# Patient Record
Sex: FEMALE | Race: WHITE | NOT HISPANIC OR LATINO | Employment: FULL TIME | URBAN - METROPOLITAN AREA
[De-identification: names, ages, dates, MRNs, and addresses within clinical notes are randomized per-mention and may not be internally consistent; named-entity substitution may affect disease eponyms.]

---

## 2017-02-06 ENCOUNTER — RADIANT APPOINTMENT (OUTPATIENT)
Dept: MAMMOGRAPHY | Facility: CLINIC | Age: 52
End: 2017-02-06
Attending: FAMILY MEDICINE
Payer: COMMERCIAL

## 2017-02-06 DIAGNOSIS — Z12.31 VISIT FOR SCREENING MAMMOGRAM: ICD-10-CM

## 2017-02-06 PROCEDURE — G0202 SCR MAMMO BI INCL CAD: HCPCS | Mod: TC

## 2017-02-14 ENCOUNTER — TELEPHONE (OUTPATIENT)
Dept: FAMILY MEDICINE | Facility: CLINIC | Age: 52
End: 2017-02-14

## 2017-02-14 DIAGNOSIS — F43.23 ADJUSTMENT DISORDER WITH MIXED ANXIETY AND DEPRESSED MOOD: Primary | ICD-10-CM

## 2017-02-14 ASSESSMENT — PATIENT HEALTH QUESTIONNAIRE - PHQ9: 5. POOR APPETITE OR OVEREATING: NOT AT ALL

## 2017-02-14 ASSESSMENT — ANXIETY QUESTIONNAIRES
6. BECOMING EASILY ANNOYED OR IRRITABLE: NOT AT ALL
1. FEELING NERVOUS, ANXIOUS, OR ON EDGE: NOT AT ALL
3. WORRYING TOO MUCH ABOUT DIFFERENT THINGS: NOT AT ALL
7. FEELING AFRAID AS IF SOMETHING AWFUL MIGHT HAPPEN: NOT AT ALL
2. NOT BEING ABLE TO STOP OR CONTROL WORRYING: NOT AT ALL
IF YOU CHECKED OFF ANY PROBLEMS ON THIS QUESTIONNAIRE, HOW DIFFICULT HAVE THESE PROBLEMS MADE IT FOR YOU TO DO YOUR WORK, TAKE CARE OF THINGS AT HOME, OR GET ALONG WITH OTHER PEOPLE: NOT DIFFICULT AT ALL
5. BEING SO RESTLESS THAT IT IS HARD TO SIT STILL: NOT AT ALL
GAD7 TOTAL SCORE: 0

## 2017-02-14 NOTE — LETTER
My Depression Action Plan  Name: Evangelina De Anda   Date of Birth 1965  Date: 2/14/2017    My doctor: Luis Antonio Mejia   My clinic: [address] [address] [phone]          GREEN    ZONE   Good Control    What it looks like:     Things are going generally well. You have normal up s and down s. You may even feel depressed from time to time, but bad moods usually last less than a day.   What you need to do:  1. Continue to care for yourself (see self care plan)  2. Check your depression survival kit and update it as needed  3. Follow your physician s recommendations including any medication.  4. Do not stop taking medication unless you consult with your physician first.           YELLOW         ZONE Getting Worse    What it looks like:     Depression is starting to interfere with your life.     It may be hard to get out of bed; you may be starting to isolate yourself from others.    Symptoms of depression are starting to last most all day and this has happened for several days.     You may have suicidal thoughts but they are not constant.   What you need to do:     1. Call your care team, your response to treatment will improve if you keep your care team informed of your progress. Yellow periods are signs an adjustment may need to be made.     2. Continue your self-care, even if you have to fake it!    3. Talk to someone in your support network    4. Open up your depression survival kit           RED    ZONE Medical Alert - Get Help    What it looks like:     Depression is seriously interfering with your life.     You may experience these or other symptoms: You can t get out of bed most days, can t work or engage in other necessary activities, you have trouble taking care of basic hygiene, or basic responsibilities, thoughts of suicide or death that will not go away, self-injurious behavior.     What you need to do:  1. Call your care team  and request a same-day appointment. If they are not available (weekends or after hours) call your local crisis line, emergency room or 911.      Electronically signed by: Roe Ramsey, February 14, 2017    Depression Self Care Plan / Survival Kit    Self-Care for Depression  Here s the deal. Your body and mind are really not as separate as most people think.  What you do and think affects how you feel and how you feel influences what you do and think. This means if you do things that people who feel good do, it will help you feel better.  Sometimes this is all it takes.  There is also a place for medication and therapy depending on how severe your depression is, so be sure to consult with your medical provider and/ or Behavioral Health Consultant if your symptoms are worsening or not improving.     In order to better manage my stress, I will:    Exercise  Get some form of exercise, every day. This will help reduce pain and release endorphins, the  feel good  chemicals in your brain. This is almost as good as taking antidepressants!  This is not the same as joining a gym and then never going! (they count on that by the way ) It can be as simple as just going for a walk or doing some gardening, anything that will get you moving.      Hygiene   Maintain good hygiene (Get out of bed in the morning, Make your bed, Brush your teeth, Take a shower, and Get dressed like you were going to work, even if you are unemployed).  If your clothes don't fit try to get ones that do.    Diet  I will strive to eat foods that are good for me, drink plenty of water, and avoid excessive sugar, caffeine, alcohol, and other mood-altering substances.  Some foods that are helpful in depression are: complex carbohydrates, B vitamins, flaxseed, fish or fish oil, fresh fruits and vegetables.    Psychotherapy  I agree to participate in Individual Therapy (if recommended).    Medication  If prescribed medications, I agree to take them.  Missing  doses can result in serious side effects.  I understand that drinking alcohol, or other illicit drug use, may cause potential side effects.  I will not stop my medication abruptly without first discussing it with my provider.    Staying Connected With Others  I will stay in touch with my friends, family members, and my primary care provider/team.    Use your imagination  Be creative.  We all have a creative side; it doesn t matter if it s oil painting, sand castles, or mud pies! This will also kick up the endorphins.    Witness Beauty  (AKA stop and smell the roses) Take a look outside, even in mid-winter. Notice colors, textures. Watch the squirrels and birds.     Service to others  Be of service to others.  There is always someone else in need.  By helping others we can  get out of ourselves  and remember the really important things.  This also provides opportunities for practicing all the other parts of the program.    Humor  Laugh and be silly!  Adjust your TV habits for less news and crime-drama and more comedy.    Control your stress  Try breathing deep, massage therapy, biofeedback, and meditation. Find time to relax each day.     My support system    Clinic Contact:  Phone number:    Contact 1:  Phone number:    Contact 2:  Phone number:    [employer]/:  Phone number:    Therapist:  Phone number:    Local crisis center:    Phone number:    Other community support:  Phone number:

## 2017-02-14 NOTE — TELEPHONE ENCOUNTER
Panel Management Review      Patient has the following on her problem list: None      Composite cancer screening  Chart review shows that this patient is due/due soon for the following None  Summary:    Patient is due/failing the following:   PHQ9    Action needed:   Patient needs to do PHQ9.    Type of outreach:    Phone, left message for patient to call back.     Questions for provider review:    None                                                                                                                                    YASMIN May       Chart routed to  .

## 2017-02-15 ASSESSMENT — PATIENT HEALTH QUESTIONNAIRE - PHQ9: SUM OF ALL RESPONSES TO PHQ QUESTIONS 1-9: 1

## 2017-02-15 ASSESSMENT — ANXIETY QUESTIONNAIRES: GAD7 TOTAL SCORE: 0

## 2017-03-13 ENCOUNTER — OFFICE VISIT (OUTPATIENT)
Dept: OBGYN | Facility: CLINIC | Age: 52
End: 2017-03-13
Payer: COMMERCIAL

## 2017-03-13 VITALS
HEIGHT: 66 IN | TEMPERATURE: 97.5 F | HEART RATE: 63 BPM | RESPIRATION RATE: 18 BRPM | BODY MASS INDEX: 22.97 KG/M2 | WEIGHT: 142.9 LBS | OXYGEN SATURATION: 99 % | DIASTOLIC BLOOD PRESSURE: 60 MMHG | SYSTOLIC BLOOD PRESSURE: 102 MMHG

## 2017-03-13 DIAGNOSIS — N90.89 VULVAR LESION: ICD-10-CM

## 2017-03-13 DIAGNOSIS — L90.0 LICHEN SCLEROSUS ET ATROPHICUS: ICD-10-CM

## 2017-03-13 DIAGNOSIS — Z00.00 ROUTINE GENERAL MEDICAL EXAMINATION AT A HEALTH CARE FACILITY: Primary | ICD-10-CM

## 2017-03-13 PROCEDURE — G0476 HPV COMBO ASSAY CA SCREEN: HCPCS | Performed by: FAMILY MEDICINE

## 2017-03-13 PROCEDURE — 99396 PREV VISIT EST AGE 40-64: CPT | Mod: 25 | Performed by: FAMILY MEDICINE

## 2017-03-13 PROCEDURE — G0145 SCR C/V CYTO,THINLAYER,RESCR: HCPCS | Performed by: FAMILY MEDICINE

## 2017-03-13 PROCEDURE — 56605 BIOPSY OF VULVA/PERINEUM: CPT | Performed by: FAMILY MEDICINE

## 2017-03-13 PROCEDURE — 87624 HPV HI-RISK TYP POOLED RSLT: CPT | Performed by: FAMILY MEDICINE

## 2017-03-13 PROCEDURE — 88305 TISSUE EXAM BY PATHOLOGIST: CPT | Mod: 26 | Performed by: FAMILY MEDICINE

## 2017-03-13 PROCEDURE — 88305 TISSUE EXAM BY PATHOLOGIST: CPT | Performed by: FAMILY MEDICINE

## 2017-03-13 NOTE — MR AVS SNAPSHOT
After Visit Summary   3/13/2017    Evangelina De Anda    MRN: [id number]           Patient Information     Date Of Birth          1965        Visit Information        Provider Department      3/13/2017 10:45 AM Melinda Whitten,  [facility]        Today's Diagnoses     Routine general medical examination at a health care facility    -  1    Vulvar lesion          Care Instructions    Return 1-2 weeks     Dr. Melinda Whitten, DO    Obstetrics and Gynecology  [facility] [place]               Follow-ups after your visit        Future tests that were ordered for you today     Open Future Orders        Priority Expected Expires Ordered    CBC with platelets Routine  3/13/2018 3/13/2017    Comprehensive metabolic panel Routine  3/13/2018 3/13/2017    Lipid panel reflex to direct LDL Routine  3/13/2018 3/13/2017    TSH with free T4 reflex Routine  3/13/2018 3/13/2017            Who to contact     If you have questions or need follow up information about today's clinic visit or your schedule please contact [facility] directly at [phone].  Normal or non-critical lab and imaging results will be communicated to you by RxAdvancehart, letter or phone within 4 business days after the clinic has received the results. If you do not hear from us within 7 days, please contact the clinic through Profylet or phone. If you have a critical or abnormal lab result, we will notify you by phone as soon as possible.  Submit refill requests through SmarTots or call your pharmacy and they will forward the refill request to us. Please allow 3 business days for your refill to be completed.          Additional Information About Your Visit        RxAdvancehart Information     SmarTots gives you secure access to your electronic health record. If you see a primary care provider, you can also send messages to your care team and make appointments. If you have questions, please call  "your primary care clinic.  If you do not have a primary care provider, please call [phone] and they will assist you.        Care EveryWhere ID     This is your Care EveryWhere ID. This could be used by other organizations to access your [facility] medical records  [id number]        Your Vitals Were     Pulse Temperature Respirations Height Last Period Pulse Oximetry    63 97.5  F (36.4  C) (Oral) 18 5' 6\" (1.676 m) 03/10/2017 99%    BMI (Body Mass Index)                   23.06 kg/m2            Blood Pressure from Last 3 Encounters:   03/13/17 102/60   12/08/16 100/64   07/13/15 105/79    Weight from Last 3 Encounters:   03/13/17 142 lb 14.4 oz (64.8 kg)   12/08/16 141 lb 1.6 oz (64 kg)   07/13/15 142 lb 3.2 oz (64.5 kg)              We Performed the Following     BIOPSY VULVA/PERINEUM, ONE LESION        Primary Care Provider Office Phone # Fax #    Luis Antonio Mejia [phone]       [facility] [address] [address] [facility] [address]        Thank you!     Thank you for choosing [facility]  for your care. Our goal is always to provide you with excellent care. Hearing back from our patients is one way we can continue to improve our services. Please take a few minutes to complete the written survey that you may receive in the mail after your visit with us. Thank you!             Your Updated Medication List - Protect others around you: Learn how to safely use, store and throw away your medicines at www.disposemymeds.org.          This list is accurate as of: 3/13/17 11:32 AM.  Always use your most recent med list.                   Brand Name Dispense Instructions for use    ibuprofen 800 MG tablet    ADVIL/MOTRIN    90 tablet    Take 1 tablet (800 mg) by mouth every 8 hours as needed for moderate pain       Multi-vitamin Tabs tablet   Generic drug:  multivitamin, therapeutic with minerals     0    1 TABLET DAILY         "

## 2017-03-13 NOTE — PROGRESS NOTES
SUBJECTIVE:  Evangelina De Anda is an 51 year old  woman who presents for   annual gyn exam. Patient's last menstrual period was 03/10/2017. Periods are irregular and less. Dysmenorrhea:none. Cyclic symptoms include none. No intermenstrual bleeding, spotting, or discharge.  Menarche age 13.  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 Pain present upon wiping and tampon use. Denies vaginal itching or burning.   Just recently went 3 months without getting her period, but had 2 within the last 2 months.     Current contraception: none  SEGUN exposure: no  History of abnormal Pap smear: No  Family history of uterine or ovarian cancer: No  Regular self breast exam: No  History of abnormal mammogram: No  Family history of breast cancer: No  History of abnormal lipids: No    Past Medical History   Diagnosis Date     Alcohol abuse, in remission      Endometrial cells on cervical Pap smear inconsistent w/LMP 2012     endo bx WNL     MAYRA II (vulvar intraepithelial neoplasia II) 2012        Family History   Problem Relation Age of Onset     C.A.D. Father 65     heart attack and quadruble bypass     CANCER Father      spleen     HEART DISEASE Father      Hypertension Paternal Grandmother      CEREBROVASCULAR DISEASE Maternal Grandmother      Cancer - colorectal Mother      in her 50's.     Neurologic Disorder Mother      MS- at age of 65     HEART DISEASE Sister 38      from massive heart attack at age of 38     Family History Negative Brother      Family History Negative Sister        Past Surgical History   Procedure Laterality Date     Surgical history of -        C section     Gyn surgery            Excise vulva wide local  7/15/2014     Procedure: EXCISE VULVA WIDE LOCAL;  Surgeon: Melinda Whitten DO;  Location: [facility] OR     Colonoscopy  2015     Dr. Estella LAROSE     Tonsillectomy       T&A as a child     Appendectomy       age 6 yrs.       Current  "Outpatient Prescriptions   Medication     ibuprofen (ADVIL,MOTRIN) 800 MG tablet     MULTI-VITAMIN OR TABS     No current facility-administered medications for this visit.      Allergies   Allergen Reactions     Pollen Extract        Social History   Substance Use Topics     Smoking status: Current Every Day Smoker     Types: Cigarettes     Smokeless tobacco: Never Used      Comment: 1 cig/day      Alcohol use No      Comment: stopped march 7th 2014       Review Of Systems  Ears/Nose/Throat: negative  Respiratory: No shortness of breath, dyspnea on exertion, cough, or hemoptysis  Cardiovascular: negative  Gastrointestinal: negative  Genitourinary: negative    This document serves as a record of the services and decisions personally performed and made by Melinda Whitten DO. It was created on his/her behalf by Cathryn Escobar, a trained medical scribe. The creation of this document is based the provider's statements to the medical scribe.  Scribziggy Escobar 10:57 AM, March 13, 2017    OBJECTIVE:  /60 (BP Location: Right arm, Patient Position: Chair, Cuff Size: Adult Regular)  Pulse 63  Temp 97.5  F (36.4  C) (Oral)  Resp 18  Ht 1.676 m (5' 6\")  Wt 64.8 kg (142 lb 14.4 oz)  LMP 03/10/2017  SpO2 99%  BMI 23.06 kg/m2    General appearance: healthy, alert and no distress  Skin: Skin color, texture, turgor normal. No rashes or lesions.  Ears: negative  Nose/Sinuses: Nares normal. Septum midline. Mucosa normal. No drainage or sinus tenderness.  Oropharynx: Lips, mucosa, and tongue normal. Teeth and gums normal.  Neck: Neck supple. No adenopathy. Thyroid symmetric, normal size,, Carotids without bruits.  Lungs: negative, Percussion normal. Good diaphragmatic excursion. Lungs clear  Heart: negative, PMI normal. No lifts, heaves, or thrills. RRR. No murmurs, clicks gallops or rub  Breasts: Inspection negative. No nipple discharge or bleeding. No masses.  Abdomen: Abdomen soft, non-tender. BS normal. No masses, " organomegaly  Pelvic: Pelvic:  Pelvic examination with pap/ Gonorrhea and Chlamydia   including  External genitalia normal   and vagina normal rugatted not atrophic  Examination of urethra  normal no masses, tenderness, scarring  bladder, no masses or tenderness  Cervix no lesions or discharge  Bimanual exam with   Uterus 6 weeks size, mid position, mobile,non-tenderness, no descent   Adnexa/parametria  Normal, no masses   Rectovaginal normal, no masses       Informed consent for vulvar biopsy obtained  Procedure:  Vulva was cleansed with betadine x 3.  5 o'clock ulcerated vulva lesion injected with 1% lidocaine.  7 mm punch biopsy performed at 5 o'clock position.  The incisions were closed with 1 figure-of-8 suture of 4-0 monocryl.  Hemostasis assured. The patient tolerated the procedure well.  No intercourse for 6 weeks.        ASSESSMENT:  Evangelina De Anda is an 51 year old  woman who presents for   annual gyn exam. Concerns:  vulvar tenderness     PLAN:  Dx:  1)  Pap smear  2)  Mammography, lipids at appropriate intervals  3) Possible Lichen Sclerosis: vulvar bx today, return to clinic in 1 week for stitch removal      Rx:  None    PE:  Reviewed health maintenance including diet, regular exercise   and periodic exams.    The information in this document, created by the medical scribe for me, accurately reflects the services I personally performed and the decisions made by me. I have reviewed and approved this document for accuracy prior to leaving the patient care area.  Melinda Whitten DO  10:57 AM, 17      Dr. Melinda Whitten, DO    Obstetrics and Gynecology  [facility] [place]

## 2017-03-13 NOTE — NURSING NOTE
"Chief Complaint   Patient presents with     Physical       Initial /60 (BP Location: Right arm, Patient Position: Chair, Cuff Size: Adult Regular)  Pulse 63  Temp 97.5  F (36.4  C) (Oral)  Resp 18  Ht 5' 6\" (1.676 m)  Wt 142 lb 14.4 oz (64.8 kg)  LMP 03/10/2017  SpO2 99%  BMI 23.06 kg/m2 Estimated body mass index is 23.06 kg/(m^2) as calculated from the following:    Height as of this encounter: 5' 6\" (1.676 m).    Weight as of this encounter: 142 lb 14.4 oz (64.8 kg).  Medication Reconciliation: complete May Ellison CMA      "

## 2017-03-13 NOTE — PATIENT INSTRUCTIONS
Return 1-2 weeks     Dr. Melinda Whitten, DO    Obstetrics and Gynecology  [facility] [place] [facility] [place]

## 2017-03-14 ENCOUNTER — TELEPHONE (OUTPATIENT)
Dept: OBGYN | Facility: CLINIC | Age: 52
End: 2017-03-14

## 2017-03-14 LAB — COPATH REPORT: NORMAL

## 2017-03-14 RX ORDER — CLOBETASOL PROPIONATE 0.5 MG/G
CREAM TOPICAL
Qty: 60 G | Refills: 0 | Status: SHIPPED | OUTPATIENT
Start: 2017-03-14 | End: 2018-09-05

## 2017-03-14 NOTE — TELEPHONE ENCOUNTER
Pt calling stating her stitch came out from her biopsy yesterday.  States this has happened in the past and she just left it alone and did epsom baths.  Advised to leave area alone and watch for any signs of infection and follow up if she has any signs.  Can use non-adherent guaze if needed based on the area to keep clothes from getting blood on them.    FYI to Dr Whitten    Ok to close if nothing else needed    Che Welsh RN, BSN

## 2017-03-14 NOTE — TELEPHONE ENCOUNTER
Yes that is ok!   Dr. Melinda Whitten, DO    Obstetrics and Gynecology  [facility] [place] [facility] [place]

## 2017-03-15 LAB
COPATH REPORT: NORMAL
PAP: NORMAL

## 2017-03-17 LAB
FINAL DIAGNOSIS: NORMAL
HPV HR 12 DNA CVX QL NAA+PROBE: NEGATIVE
HPV16 DNA SPEC QL NAA+PROBE: NEGATIVE
HPV18 DNA SPEC QL NAA+PROBE: NEGATIVE
SPECIMEN DESCRIPTION: NORMAL

## 2017-03-18 DIAGNOSIS — Z00.00 ROUTINE GENERAL MEDICAL EXAMINATION AT A HEALTH CARE FACILITY: ICD-10-CM

## 2017-03-18 LAB
ALBUMIN SERPL-MCNC: 3.7 G/DL (ref 3.4–5)
ALP SERPL-CCNC: 64 U/L (ref 40–150)
ALT SERPL W P-5'-P-CCNC: 24 U/L (ref 0–50)
ANION GAP SERPL CALCULATED.3IONS-SCNC: 8 MMOL/L (ref 3–14)
AST SERPL W P-5'-P-CCNC: 14 U/L (ref 0–45)
BILIRUB SERPL-MCNC: 1.1 MG/DL (ref 0.2–1.3)
BUN SERPL-MCNC: 15 MG/DL (ref 7–30)
CALCIUM SERPL-MCNC: 8.2 MG/DL (ref 8.5–10.1)
CHLORIDE SERPL-SCNC: 108 MMOL/L (ref 94–109)
CHOLEST SERPL-MCNC: 161 MG/DL
CO2 SERPL-SCNC: 25 MMOL/L (ref 20–32)
CREAT SERPL-MCNC: 0.87 MG/DL (ref 0.52–1.04)
ERYTHROCYTE [DISTWIDTH] IN BLOOD BY AUTOMATED COUNT: 12.9 % (ref 10–15)
GFR SERPL CREATININE-BSD FRML MDRD: 69 ML/MIN/1.7M2
GLUCOSE SERPL-MCNC: 88 MG/DL (ref 70–99)
HCT VFR BLD AUTO: 36.9 % (ref 35–47)
HDLC SERPL-MCNC: 66 MG/DL
HGB BLD-MCNC: 12.6 G/DL (ref 11.7–15.7)
LDLC SERPL CALC-MCNC: 87 MG/DL
MCH RBC QN AUTO: 30.7 PG (ref 26.5–33)
MCHC RBC AUTO-ENTMCNC: 34.1 G/DL (ref 31.5–36.5)
MCV RBC AUTO: 90 FL (ref 78–100)
NONHDLC SERPL-MCNC: 95 MG/DL
PLATELET # BLD AUTO: 276 10E9/L (ref 150–450)
POTASSIUM SERPL-SCNC: 4.2 MMOL/L (ref 3.4–5.3)
PROT SERPL-MCNC: 6.7 G/DL (ref 6.8–8.8)
RBC # BLD AUTO: 4.11 10E12/L (ref 3.8–5.2)
SODIUM SERPL-SCNC: 141 MMOL/L (ref 133–144)
TRIGL SERPL-MCNC: 40 MG/DL
TSH SERPL DL<=0.005 MIU/L-ACNC: 1.69 MU/L (ref 0.4–4)
WBC # BLD AUTO: 6.5 10E9/L (ref 4–11)

## 2017-03-18 PROCEDURE — 80061 LIPID PANEL: CPT | Performed by: FAMILY MEDICINE

## 2017-03-18 PROCEDURE — 80053 COMPREHEN METABOLIC PANEL: CPT | Performed by: FAMILY MEDICINE

## 2017-03-18 PROCEDURE — 84443 ASSAY THYROID STIM HORMONE: CPT | Performed by: FAMILY MEDICINE

## 2017-03-18 PROCEDURE — 36415 COLL VENOUS BLD VENIPUNCTURE: CPT | Performed by: FAMILY MEDICINE

## 2017-03-18 PROCEDURE — 85027 COMPLETE CBC AUTOMATED: CPT | Performed by: FAMILY MEDICINE

## 2017-03-20 ENCOUNTER — OFFICE VISIT (OUTPATIENT)
Dept: OBGYN | Facility: CLINIC | Age: 52
End: 2017-03-20
Payer: COMMERCIAL

## 2017-03-20 VITALS
TEMPERATURE: 98.7 F | HEIGHT: 67 IN | SYSTOLIC BLOOD PRESSURE: 100 MMHG | WEIGHT: 144.5 LBS | OXYGEN SATURATION: 98 % | BODY MASS INDEX: 22.68 KG/M2 | HEART RATE: 61 BPM | DIASTOLIC BLOOD PRESSURE: 56 MMHG

## 2017-03-20 DIAGNOSIS — L90.0 LICHEN SCLEROSUS ET ATROPHICUS: Primary | ICD-10-CM

## 2017-03-20 PROCEDURE — 99212 OFFICE O/P EST SF 10 MIN: CPT | Performed by: FAMILY MEDICINE

## 2017-03-20 NOTE — NURSING NOTE
"Chief Complaint   Patient presents with     RECHECK       Initial /56 (BP Location: Right arm, Patient Position: Chair, Cuff Size: Adult Regular)  Pulse 61  Temp 98.7  F (37.1  C) (Oral)  Ht 5' 7\" (1.702 m)  Wt 144 lb 8 oz (65.5 kg)  LMP 03/10/2017  SpO2 98%  Breastfeeding? No  BMI 22.63 kg/m2 Estimated body mass index is 22.63 kg/(m^2) as calculated from the following:    Height as of this encounter: 5' 7\" (1.702 m).    Weight as of this encounter: 144 lb 8 oz (65.5 kg).  Medication Reconciliation: complete   YASMIN May      "

## 2017-03-20 NOTE — MR AVS SNAPSHOT
"              After Visit Summary   3/20/2017    Evangelina De Anda    MRN: [id number]           Patient Information     Date Of Birth          1965        Visit Information        Provider Department      3/20/2017 1:15 PM Melinda Whitten, DO [facility]        Care Instructions    Return in 12  weeks     Dr. Melinda Whitten, DO    Obstetrics and Gynecology  [facility]               Follow-ups after your visit        Who to contact     If you have questions or need follow up information about today's clinic visit or your schedule please contact [facility] directly at [phone].  Normal or non-critical lab and imaging results will be communicated to you by ChatterPlughart, letter or phone within 4 business days after the clinic has received the results. If you do not hear from us within 7 days, please contact the clinic through Camera Agroalimentost or phone. If you have a critical or abnormal lab result, we will notify you by phone as soon as possible.  Submit refill requests through HMP Communications or call your pharmacy and they will forward the refill request to us. Please allow 3 business days for your refill to be completed.          Additional Information About Your Visit        MyChart Information     H[employer] Communications gives you secure access to your electronic health record. If you see a primary care provider, you can also send messages to your care team and make appointments. If you have questions, please call your primary care clinic.  If you do not have a primary care provider, please call [phone] and they will assist you.        Care EveryWhere ID     This is your Care EveryWhere ID. This could be used by other organizations to access your [facility] medical records  [id number]        Your Vitals Were     Pulse Temperature Height Last Period Pulse Oximetry Breastfeeding?    61 98.7  F (37.1  C) (Oral) 5' 7\" (1.702 m) 03/10/2017 98% No    BMI (Body Mass Index)       "             22.63 kg/m2            Blood Pressure from Last 3 Encounters:   03/20/17 100/56   03/13/17 102/60   12/08/16 100/64    Weight from Last 3 Encounters:   03/20/17 144 lb 8 oz (65.5 kg)   03/13/17 142 lb 14.4 oz (64.8 kg)   12/08/16 141 lb 1.6 oz (64 kg)              Today, you had the following     No orders found for display       Primary Care Provider Office Phone # Fax #    Luis Antonio Mejia [phone]       [facility] [address]  [facility] [address]        Thank you!     Thank you for choosing [facility]  for your care. Our goal is always to provide you with excellent care. Hearing back from our patients is one way we can continue to improve our services. Please take a few minutes to complete the written survey that you may receive in the mail after your visit with us. Thank you!             Your Updated Medication List - Protect others around you: Learn how to safely use, store and throw away your medicines at www.disposemymeds.org.          This list is accurate as of: 3/20/17  1:27 PM.  Always use your most recent med list.                   Brand Name Dispense Instructions for use    clobetasol 0.05 % cream    TEMOVATE    60 g    Apply sparingly to affected area  daily for 3 months       ibuprofen 800 MG tablet    ADVIL/MOTRIN    90 tablet    Take 1 tablet (800 mg) by mouth every 8 hours as needed for moderate pain       Multi-vitamin Tabs tablet   Generic drug:  multivitamin, therapeutic with minerals     0    1 TABLET DAILY

## 2017-03-20 NOTE — PATIENT INSTRUCTIONS
Return in 12  weeks     Dr. Melinda Whitten,     Obstetrics and Gynecology  [facility] [place] [facility] [place]

## 2017-03-20 NOTE — PROGRESS NOTES
SUBJECTIVE:  Evangelina De Anda is an 51 year old  woman who presents for   Gyn follow-up exam. She was seen on 3/13/17, for stitch removal from vulvar bx. However, the stitch came out on its own during the night.       Past Medical History   Diagnosis Date     Alcohol abuse, in remission      Endometrial cells on cervical Pap smear inconsistent w/LMP 2012     endo bx WNL     MAYRA II (vulvar intraepithelial neoplasia II) 2012          Family History   Problem Relation Age of Onset     C.A.D. Father 65     heart attack and quadruble bypass     CANCER Father      spleen     HEART DISEASE Father      Hypertension Paternal Grandmother      CEREBROVASCULAR DISEASE Maternal Grandmother      Cancer - colorectal Mother      in her 50's.     Neurologic Disorder Mother      MS- at age of 65     HEART DISEASE Sister 38      from massive heart attack at age of 38     Family History Negative Brother      Family History Negative Sister        Past Surgical History   Procedure Laterality Date     Surgical history of -        C section     Gyn surgery            Excise vulva wide local  7/15/2014     Procedure: EXCISE VULVA WIDE LOCAL;  Surgeon: Melinda Whitten DO;  Location: [facility] OR     Colonoscopy  2015     Dr. Estella SOLIS     Tonsillectomy       T&A as a child     Appendectomy       age 6 yrs.       Current Outpatient Prescriptions   Medication     clobetasol (TEMOVATE) 0.05 % cream     ibuprofen (ADVIL,MOTRIN) 800 MG tablet     MULTI-VITAMIN OR TABS     No current facility-administered medications for this visit.      Allergies   Allergen Reactions     Pollen Extract        Social History   Substance Use Topics     Smoking status: Current Every Day Smoker     Types: Cigarettes     Smokeless tobacco: Never Used      Comment: 1 cig/day      Alcohol use No      Comment: stopped 2014       Review Of Systems  Ears/Nose/Throat: negative  Respiratory: No shortness of breath, dyspnea on  "exertion, cough, or hemoptysis  Cardiovascular: negative  Gastrointestinal: negative  Genitourinary: negative    This document serves as a record of the services and decisions personally performed and made by Melinda Whitten DO. It was created on his/her behalf by Cathryn Escobar, a trained medical scribe. The creation of this document is based the provider's statements to the medical scribe.  Micahibziggy Escobar 1:20 PM, 2017    OBJECTIVE:  /56 (BP Location: Right arm, Patient Position: Chair, Cuff Size: Adult Regular)  Pulse 61  Temp 98.7  F (37.1  C) (Oral)  Ht 1.702 m (5' 7\")  Wt 65.5 kg (144 lb 8 oz)  LMP 03/10/2017  SpO2 98%  Breastfeeding? No  BMI 22.63 kg/m2  General appearance: healthy, alert and no distress  Pelvic: External genitalia and vagina normal. Bimanual and rectovaginal normal., positive findings: 1 cm patch whitened skin at 6 o'clock androitis     ASSESSMENT:  Evangelina De Anda is an 51 year old  woman who presents for   Gyn follow-up exam. She was seen on 3/13/17, for stitch removal from vulvar bx, however, the stitch came out on its own.  Today the following concerns were addressed:    PLAN:  Dx: lichen sclerosis   1) Apply clobetasol 0.05% cream daily for 12 weeks for Lichen Sclerosis  2) Follow up in 12 weeks     Rx:      The information in this document, created by the medical scribe for me, accurately reflects the services I personally performed and the decisions made by me. I have reviewed and approved this document for accuracy prior to leaving the patient care area.  Melinda Whitten DO  1:20 PM, 17    Dr. Melinda Whitten DO    OB/GYN   [facility]  "

## 2017-04-10 ENCOUNTER — OFFICE VISIT (OUTPATIENT)
Dept: OBGYN | Facility: CLINIC | Age: 52
End: 2017-04-10
Payer: COMMERCIAL

## 2017-04-10 VITALS
BODY MASS INDEX: 21.74 KG/M2 | HEIGHT: 67 IN | OXYGEN SATURATION: 98 % | WEIGHT: 138.5 LBS | HEART RATE: 67 BPM | SYSTOLIC BLOOD PRESSURE: 90 MMHG | TEMPERATURE: 98 F | DIASTOLIC BLOOD PRESSURE: 60 MMHG

## 2017-04-10 DIAGNOSIS — R87.619 ENDOMETRIAL CELLS ON CERVICAL PAP SMEAR INCONSISTENT WITH LAST MENSTRUAL PERIOD: Primary | ICD-10-CM

## 2017-04-10 PROCEDURE — 58100 BIOPSY OF UTERUS LINING: CPT | Performed by: FAMILY MEDICINE

## 2017-04-10 PROCEDURE — 88305 TISSUE EXAM BY PATHOLOGIST: CPT | Mod: 26 | Performed by: FAMILY MEDICINE

## 2017-04-10 PROCEDURE — 88305 TISSUE EXAM BY PATHOLOGIST: CPT | Performed by: FAMILY MEDICINE

## 2017-04-10 NOTE — MR AVS SNAPSHOT
"              After Visit Summary   4/10/2017    Evangelina De Anda    MRN: [id number]           Patient Information     Date Of Birth          1965        Visit Information        Provider Department      4/10/2017 2:00 PM Melinda Whitten,  [facility]        Today's Diagnoses     Endometrial cells on cervical Pap smear inconsistent with last menstrual period    -  1       Follow-ups after your visit        Who to contact     If you have questions or need follow up information about today's clinic visit or your schedule please contact [facility] directly at [phone].  Normal or non-critical lab and imaging results will be communicated to you by Blackbook[employer], letter or phone within 4 business days after the clinic has received the results. If you do not hear from us within 7 days, please contact the clinic through [employer] or phone. If you have a critical or abnormal lab result, we will notify you by phone as soon as possible.  Submit refill requests through [employer] or call your pharmacy and they will forward the refill request to us. Please allow 3 business days for your refill to be completed.          Additional Information About Your Visit        MyChart Information     [employer] gives you secure access to your electronic health record. If you see a primary care provider, you can also send messages to your care team and make appointments. If you have questions, please call your primary care clinic.  If you do not have a primary care provider, please call [phone] and they will assist you.        Care EveryWhere ID     This is your Care EveryWhere ID. This could be used by other organizations to access your [place] medical records  [id number]        Your Vitals Were     Pulse Temperature Height Last Period Pulse Oximetry Breastfeeding?    67 98  F (36.7  C) (Oral) 5' 7\" (1.702 m) 03/10/2017 98% No    BMI (Body Mass Index)                   21.69 kg/m2            Blood " Pressure from Last 3 Encounters:   04/10/17 90/60   03/20/17 100/56   03/13/17 102/60    Weight from Last 3 Encounters:   04/10/17 138 lb 8 oz (62.8 kg)   03/20/17 144 lb 8 oz (65.5 kg)   03/13/17 142 lb 14.4 oz (64.8 kg)              We Performed the Following     ENDOMETRIAL BIOPSY W/O CERVICAL DILATION     Surgical pathology exam        Primary Care Provider Office Phone # Fax #    Luis Antonio Mejia [phone]       [facility] [address]  [facility] [address]        Thank you!     Thank you for choosing [facility]  for your care. Our goal is always to provide you with excellent care. Hearing back from our patients is one way we can continue to improve our services. Please take a few minutes to complete the written survey that you may receive in the mail after your visit with us. Thank you!             Your Updated Medication List - Protect others around you: Learn how to safely use, store and throw away your medicines at www.disposemymeds.org.          This list is accurate as of: 4/10/17  3:54 PM.  Always use your most recent med list.                   Brand Name Dispense Instructions for use    clobetasol 0.05 % cream    TEMOVATE    60 g    Apply sparingly to affected area  daily for 3 months       ibuprofen 800 MG tablet    ADVIL/MOTRIN    90 tablet    Take 1 tablet (800 mg) by mouth every 8 hours as needed for moderate pain       Multi-vitamin Tabs tablet   Generic drug:  multivitamin, therapeutic with minerals     0    1 TABLET DAILY

## 2017-04-10 NOTE — PROGRESS NOTES
S: Evangelina De Anda is a 51 year old woman  who presents today for an endometrial biopsy. Biopsy is indicated by endometrial cells that were found to be present in her last pap smear.     O: LMP 03/10/2017   External genitalia normal   and vagina normal rugatted *** atrophic  Examination of urethra *** normal no masses, tenderness, scarring  bladder, no masses or tenderness  Cervix no lesions or discharge  Bimanual exam with   Uterus *** weeks size, *** position, mobile,***-tenderness, *** descent   Adnexa/parametria  ***    After obtaining informed consent pt prepped in the usual sterile fashion and endometrial biopsy preformed w a pipelle type sampler without difficulty or complication w a thorough sampling and acceptable specimen.  The uterus sounded to *** cm.  the pt tolerated the procedure well and was D/C'd in good condition. Signs and Sx's of complications disc, pt to call.  pt will call in 1 week to rev path report and develope an approp plan.      Assessment: 51 year old female here today for endo biopsy    Plan:   Pending pathology  Return in ***      Dr. Melinda Whitten, DO    OB/GYN   [facility] [phone]  [facility] [phone]

## 2017-04-10 NOTE — NURSING NOTE
"Chief Complaint   Patient presents with     Consult     endometrial bx       Initial BP 90/60 (BP Location: Right arm, Patient Position: Chair, Cuff Size: Adult Regular)  Pulse 67  Temp 98  F (36.7  C) (Oral)  Ht 5' 7\" (1.702 m)  Wt 138 lb 8 oz (62.8 kg)  LMP 03/10/2017  SpO2 98%  Breastfeeding? No  BMI 21.69 kg/m2 Estimated body mass index is 21.69 kg/(m^2) as calculated from the following:    Height as of this encounter: 5' 7\" (1.702 m).    Weight as of this encounter: 138 lb 8 oz (62.8 kg).  Medication Reconciliation: complete   YASMIN May      "

## 2017-04-10 NOTE — PROGRESS NOTES
S: Evangelina De Anda is a 51 year old woman  who presents today for an endometrial biopsy. Biopsy is indicated by endometrial cells that were found to be present in her last pap smear. She states her menses are irregular where she did not have one for 3 months.      This document serves as a record of the services and decisions personally performed and made by Melinda Whitten DO. It was created on his/her behalf by Nadira Rahman,a trained medical scribe. The creation of this document is based the provider's statements to the medical scribe.  Scribe Nadira Rahman 2:16 PM, April 10, 2017    O: LMP 03/10/2017   External genitalia normal   and vagina normal rugatted not atrophic  Examination of urethra normal no masses, tenderness, scarring  bladder, no masses or tenderness  Cervix no lesions or discharge  Bimanual exam with   Uterus 6 weeks size, mid position, mobile, non-tenderness, no descent   Adnexa/parametria normal     Procedure: Endometrial biopsy    Indication: Endometrial cells on pap and age > 40    Discussed risk of bleeding, infection, uterine perforation, cramping pain.  Pt agreed to proceed with procedure after all questions answered.    After obtaining informed consent pt prepped in the usual sterile fashion and endometrial biopsy preformed w a pipelle type sampler without difficulty or complication w a thorough sampling and acceptable specimen. The uterus sounded to 8 cm. the pt tolerated the procedure well and was D/C'd in good condition. Signs and Sx's of complications disc, pt to call. pt will call in 1 week to rev path report and develope an approp plan.    Patient tolerated the procedure well.  There were no apparent complications and bleeding was minimal.    She is instructed to use no tampons and have no intercourse for the next 5 days.       Assessment: 51 year old female here today for endo biopsy     Plan:   Pending pathology      The information in this document, created by the  medical scribe for me, accurately reflects the services I personally performed and the decisions made by me. I have reviewed and approved this document for accuracy prior to leaving the patient care area.  Melinda Whitten, DO  2:16 PM, 04/10/17    Dr. Melinda Whitten, DO    OB/GYN   [facility] [phone]  [facility] [phone]

## 2017-04-12 LAB — COPATH REPORT: NORMAL

## 2017-05-22 ENCOUNTER — OFFICE VISIT (OUTPATIENT)
Dept: FAMILY MEDICINE | Facility: CLINIC | Age: 52
End: 2017-05-22
Payer: COMMERCIAL

## 2017-05-22 ENCOUNTER — RADIANT APPOINTMENT (OUTPATIENT)
Dept: GENERAL RADIOLOGY | Facility: CLINIC | Age: 52
End: 2017-05-22
Attending: FAMILY MEDICINE
Payer: COMMERCIAL

## 2017-05-22 VITALS
DIASTOLIC BLOOD PRESSURE: 60 MMHG | SYSTOLIC BLOOD PRESSURE: 100 MMHG | BODY MASS INDEX: 22.16 KG/M2 | TEMPERATURE: 98.3 F | OXYGEN SATURATION: 97 % | RESPIRATION RATE: 18 BRPM | HEART RATE: 67 BPM | HEIGHT: 67 IN | WEIGHT: 141.2 LBS

## 2017-05-22 DIAGNOSIS — M54.16 LUMBAR RADICULOPATHY: Primary | ICD-10-CM

## 2017-05-22 DIAGNOSIS — M54.16 LUMBAR RADICULOPATHY: ICD-10-CM

## 2017-05-22 PROCEDURE — 72100 X-RAY EXAM L-S SPINE 2/3 VWS: CPT

## 2017-05-22 PROCEDURE — 99213 OFFICE O/P EST LOW 20 MIN: CPT | Performed by: FAMILY MEDICINE

## 2017-05-22 NOTE — PROGRESS NOTES
"  SUBJECTIVE:                                                    Evangelina De Anda is a 51 year old female who presents to clinic today for the following health issues:     Patient presents with:  Musculoskeletal Problem    Patient here with concerns of pain around her left hip and buttocks area radiating down to the leg. Has pain that radiates down the lateral thigh and then across the front of the left shin. Occasionally some in the foot. Has numbness in the leg to the shin and foot area as well wrapping around to the bottom of the left foot. Denies any trauma or injury. Symptoms present for about 6-8 weeks. Has tried some exercises as she is a  but has not been effective. Denies significant weakness.      ROS:  MUSCULOSKELETAL: Pain as noted above  NEURO: Numbness left leg, denies weakness     OBJECTIVE:                                                    /60 (BP Location: Right arm, Patient Position: Chair, Cuff Size: Adult Regular)  Pulse 67  Temp 98.3  F (36.8  C) (Oral)  Resp 18  Ht 5' 7\" (1.702 m)  Wt 141 lb 3.2 oz (64 kg)  SpO2 97%  Breastfeeding? No  BMI 22.12 kg/m2Body mass index is 22.12 kg/(m^2).  GENERAL APPEARANCE: healthy, alert and no distress  MS: Normal hip and back exam, normal straight leg test  NEURO: strength slightly decreased to dorsiflexion of left ankle, normal deep tendon reflexes at ankles, decreased bilaterally at patella    X-ray lumbar spine ordered and interpreted in the office today. X-ray shows: normal.  Radiology read pending.     ASSESSMENT/PLAN:                                                    1. Lumbar radiculopathy  Patient with symptoms most consistent with lumbar radiculopathy at L5 dermatome with leg pain and numbness. X-ray does not show lumbar degenerative changes. Recommend initial conservative management as she has symptoms that are only mild to moderate. Recommend physical therapy, ibuprofen or acetaminophen. Core muscle strengthening. " Consider further workup with sports medicine if not improving in 6 weeks.  - XR Lumbar Spine 2/3 Views; Future    Luis Antonio Ray MD Roberto  [facility]

## 2017-05-22 NOTE — MR AVS SNAPSHOT
After Visit Summary   5/22/2017    Evangelina De Anda    MRN: [id number]           Patient Information     Date Of Birth          1965        Visit Information        Provider Department      5/22/2017 2:30 PM Luis Antonio Mejia MD [facility]        Today's Diagnoses     Lumbar radiculopathy    -  1      Care Instructions             Low Back Pain            What is low back pain?   Low back pain is pain and stiffness in the lower back. It is one of the most common reasons people miss work.   How does it occur?   Your lower back is called your lumbar spine. It is made up of 5 bones called lumbar vertebrae. In between the vertebrae are shock absorbers called disks. Back pain can occur from an injury to the vertebrae or when a disk bulges or herniates.   Low back pain is usually caused when a ligament or muscle holding a vertebra in its proper position is strained. Vertebrae are bones that make up the spinal column through which the spinal cord passes. When these muscles or ligaments become weak or strained, the spine loses its stability, resulting in pain.   Low back pain can occur if your job involves lifting and carrying heavy objects, or if you spend a lot of time sitting or standing in one position or bending over. It can be caused by a fall or by unusually strenuous exercise. It can be brought on by the tension and stress that cause headaches in some people. It can even be brought on by violent sneezing or coughing.   People who are overweight may have low back pain because of the added stress on their back.   Back pain may occur when the muscles, joints, bones, and connective tissues of the back become inflamed as a result of an infection or an immune system problem. Arthritic disorders as well as some congenital and degenerative conditions may cause back pain.   Back pain accompanied by loss of bladder or bowel control, trouble moving your legs, or numbness or tingling in  your arms or legs requires immediate medical treatment.   What are the symptoms?   Symptoms include:   pain in the back or legs   stiffness, spasm, or limited motion   The pain may be constant or may happen only in certain positions. It may get worse when you cough, sneeze, bend, twist, or strain during a bowel movement. The pain may be in only one spot or may spread to other areas, most commonly down the buttocks and into the back of the thigh.   A low back strain typically does not produce pain past the knee into the calf or foot. Tingling or numbness in the calf or foot may indicate a herniated disk or pinched nerve.   Be sure to see your healthcare provider if:   You have weakness in your leg, especially if you cannot lift your foot, because this may be a sign of nerve damage.   You have new bowel or bladder problems as well as back pain, which may be a sign of severe injury to your spinal cord.   You have pain that gets worse despite treatment.   How is it diagnosed?   Your healthcare provider will review your medical history and examine you. You may have X-rays, an MRI, CT scan, or a bone scan.   How is it treated?   To treat this condition:   Put an ice pack, gel pack, or package of frozen vegetables, wrapped in a cloth on the area every 3 to 4 hours, for up to 20 minutes at a time for the first 2 or 3 days.   Use a heating pad or hot water bottle. Don't let the heating pad get too hot, and don't fall asleep with it. You could get a burn.   Rest in bed on a firm mattress. Often it helps to lie on your back with your knees raised on a pillow. However, some people prefer to lie on their side with their knees bent. It's best to try to stay active, so try not to rest in bed longer than 1 to 2 days.   Take muscle relaxants as recommended by your healthcare provider.   Take an anti-inflammatory such as ibuprofen, or other medicine as directed by your provider. Nonsteroidal anti-inflammatory medicines (NSAIDs) may  cause stomach bleeding and other problems. These risks increase with age. Read the label and take as directed. Unless recommended by your healthcare provider, do not take for more than 10 days.   Get a back massage by a trained person.   Wear a belt or corset to support your back.   Do the exercises recommended by your provider. Your provider may also prescribe physical therapy.   Talk with a counselor, if your back pain is related to tension caused by emotional problems.   When the pain is gone, ask your healthcare provider about starting an exercise program such as the following:   Exercise moderately every day, using stretching and warm-up exercises suggested by your provider or physical therapist.   Exercise vigorously for about 30 minutes 3 times a week by walking, swimming, using a stationary bicycle, or doing low-impact aerobics.   Exercising regularly will not only help your back, it will also help keep you healthier overall.   How long will the effects last?   The effects of back pain last as long as the cause exists or until your body recovers from the strain, usually a day or two but sometimes weeks.   How can I take care of myself?   In addition to the treatment described above, keep in mind these suggestions:   Practice good posture. Stand with your head up, shoulders straight, chest forward, weight balanced evenly on both feet, and pelvis tucked in.   Lose weight if you are overweight   Keep your core muscles strong. These are your abdominal and back muscles.   Sleep without a pillow under your head.   Pain is the best way to  the pace you should set in increasing your activity and exercise. Minor discomfort, stiffness, soreness, and mild aches need not interfere with activity. However, limit your activities temporarily if:   Your symptoms return.   The pain increases when you are more active.   The pain increases within 24 hours after a new or higher level of activity.   When can I return to my  normal activities?   Everyone recovers from an injury at a different rate. Return to your activities depends on how soon your back recovers, not by how many days or weeks it has been since your injury has occurred. In general, the longer you have symptoms before you start treatment, the longer it will take to get better. The goal is to return to your normal activities as soon as is safely possible. If you return too soon you may worsen your injury.   It is important that you have fully recovered from your low back pain before you return to any strenuous activity. You must be able to have the same range of motion that you had before your injury. You must be able to walk and twist without pain.   What can I do to help prevent low back pain?   You can reduce the strain on your back by doing the following:   Don't push with your arms when you move a heavy object. Turn around and push backwards so the strain is taken by your legs.   Whenever you sit, sit in a straight-backed chair and hold your spine against the back of the chair.   Bend your knees and hips and keep your back straight when you lift a heavy object.   Avoid lifting heavy objects higher than your waist.   Hold packages you carry close to your body, with your arms bent.   Use a footrest for one foot when you stand or sit in one spot for a long time. This keeps your back straight.   Bend your knees when you bend over.   Sit close to the pedals when you drive and use your seat belt and a hard backrest or pillow.   Lie on your side with your knees bent when you sleep or rest. It may help to put a pillow between your knees.   Put a pillow under your knees when you sleep on your back.   Raise the foot of the bed 8 inches to discourage sleeping on your stomach unless you have other problems that require that you keep your head elevated.   To rest your back, hold each of these positions for 5?minutes or longer:   Lie on your back, bend your knees, and put pillows  under your knees.   Lie on your back on the floor with a pillow under your neck. Bend your knees to a 90-degree angle, and put your lower legs and feet on a chair.   Lie on your back, bend your knees, and bring one knee up to your chest and hold it there. Repeat with the other knee, then bring both knees to your chest. When holding your knee to your chest, grab your thigh rather than your lower leg to avoid over flexing your knee.     Published by [employer].  This content is reviewed periodically and is subject to change as new health information becomes available. The information is intended to inform and educate and is not a replacement for medical evaluation, advice, diagnosis or treatment by a healthcare professional.   Developed by Tanya Corey RN, [place], and [facility].   ? 2010 [facility] and/or its affiliates. All Rights Reserved.           Low Back Pain Exercise          Standing hamstring stretch: Put the heel of one leg on a stool about 15 inches high. Keep your leg straight. Lean forward, bending at the hips until you feel a mild stretch in the back of your thigh. Make sure you do not roll your shoulders or bend at the waist when doing this. You want to stretch your leg, not your lower back. Hold the stretch for 15 to 30 seconds. Repeat with each leg 3 times.   Cat and camel: Get down on your hands and knees. Let your stomach sag, allowing your back to curve downward. Hold this position for 5 seconds. Then arch your back and hold for 5 seconds. Do 3 sets of 10.   Quadruped arm and leg raise: Get down on your hands and knees. Pull in your belly button and tighten your abdominal muscles to stiffen your spine. While keeping your abdominals tight, raise one arm and the opposite leg away from you. Hold this position for 5 seconds. Lower your arm and leg slowly and change sides. Do this 10 times on each side.   Pelvic tilt: Lie on your back with your knees bent and your feet flat on the floor. Tighten  your abdominal muscles and push your lower back into the floor. Hold this position for 5 seconds, then relax. Do 3 sets of 10.   Partial curl: Lie on your back with your knees bent and your feet flat on the floor. Tighten your stomach muscles. Tuck your chin to your chest. With your hands stretched out in front of you, curl your upper body forward until your shoulders clear the floor. Hold this position for 3 seconds. Don't hold your breath. It helps to breathe out as you lift your shoulders up. Relax back to the floor. Repeat 10 times. Build to 3 sets of 10. To challenge yourself, clasp your hands behind your head and keep your elbows out to the side.   Gluteal stretch: Lie on your back with both knees bent. Rest the ankle of one leg over the knee of your other leg. Grasp the thigh of the bottom leg and pull toward your chest. You will feel a stretch along the buttocks and possibly along the outside of your hip. Hold the stretch for 15 to 30 seconds. Repeat 3 times with each leg.   Extension exercise:   0. Lie face down on the floor for 5 minutes. If this hurts too much, lie face down with a pillow under your stomach. This should relieve your leg or back pain. When you can lie on your stomach for 5 minutes without a pillow, you can continue with Part B of this exercise.   0. After lying on your stomach for 5 minutes, prop yourself up on your elbows for another 5 minutes. If you can do this without having more leg or buttock pain, you can start doing part C of this exercise.   0. Lie on your stomach with your hands under your shoulders. Then press down on your hands and extend your elbows while keeping your hips flat on the floor. Hold for 1 second and lower yourself to the floor. Do 3 to 5 sets of 10 repetitions. Rest for 1 minute between sets. You should have no pain in your legs when you do this, but it is normal to feel some pain in your lower back.   Do this exercise several times a day.   Side plank: Lie on  your side with your legs, hips, and shoulders in a straight line. Prop yourself up onto your forearm so your elbow is directly under your shoulder. Lift your hips off the floor and balance on your forearm and the outside of your foot. Try to hold this position for 15 seconds, then slowly lower your hip to the ground. Switch sides and repeat. Work up to holding for 1 minute or longer. This exercise can be made easier by starting with your knees and hips flexed toward your chest.   Published by [employer].  This content is reviewed periodically and is subject to change as new health information becomes available. The information is intended to inform and educate and is not a replacement for medical evaluation, advice, diagnosis or treatment by a healthcare professional.   Written by Elif Soliman, MS, PT, and Tanya Patel PT, [facility], [facility], for [facility]   ? 2010 [facility] and/or its affiliates. All Rights Reserved.         Copyright   [employer] 2011                    Follow-ups after your visit        Who to contact     If you have questions or need follow up information about today's clinic visit or your schedule please contact [facility] directly at [phone].  Normal or non-critical lab and imaging results will be communicated to you by MyChart, letter or phone within 4 business days after the clinic has received the results. If you do not hear from us within 7 days, please contact the clinic through [employer] or phone. If you have a critical or abnormal lab result, we will notify you by phone as soon as possible.  Submit refill requests through [employer] or call your pharmacy and they will forward the refill request to us. Please allow 3 business days for your refill to be completed.          Additional Information About Your Visit        [employer] Information     [employer] gives you secure access to your electronic health record. If you see a primary care provider, you can also  "send messages to your care team and make appointments. If you have questions, please call your primary care clinic.  If you do not have a primary care provider, please call [phone] and they will assist you.        Care EveryWhere ID     This is your Care EveryWhere ID. This could be used by other organizations to access your [place] medical records  [id number]        Your Vitals Were     Pulse Temperature Respirations Height Pulse Oximetry Breastfeeding?    67 98.3  F (36.8  C) (Oral) 18 5' 7\" (1.702 m) 97% No    BMI (Body Mass Index)                   22.12 kg/m2            Blood Pressure from Last 3 Encounters:   05/22/17 100/60   04/10/17 90/60   03/20/17 100/56    Weight from Last 3 Encounters:   05/22/17 141 lb 3.2 oz (64 kg)   04/10/17 138 lb 8 oz (62.8 kg)   03/20/17 144 lb 8 oz (65.5 kg)               Primary Care Provider Office Phone # Fax #    Luis Antonio Mejia [phone]       [facility] [address] [address] [facility] [address]        Thank you!     Thank you for choosing [facility]  for your care. Our goal is always to provide you with excellent care. Hearing back from our patients is one way we can continue to improve our services. Please take a few minutes to complete the written survey that you may receive in the mail after your visit with us. Thank you!             Your Updated Medication List - Protect others around you: Learn how to safely use, store and throw away your medicines at www.disposemymeds.org.          This list is accurate as of: 5/22/17  3:19 PM.  Always use your most recent med list.                   Brand Name Dispense Instructions for use    clobetasol 0.05 % cream    TEMOVATE    60 g    Apply sparingly to affected area  daily for 3 months       ibuprofen 800 MG tablet    ADVIL/MOTRIN    90 tablet    Take 1 tablet (800 mg) by mouth every 8 hours as needed for moderate pain       Multi-vitamin Tabs tablet   Generic drug:  " multivitamin, therapeutic with minerals     0    1 TABLET DAILY

## 2017-05-22 NOTE — PATIENT INSTRUCTIONS
Low Back Pain            What is low back pain?   Low back pain is pain and stiffness in the lower back. It is one of the most common reasons people miss work.   How does it occur?   Your lower back is called your lumbar spine. It is made up of 5 bones called lumbar vertebrae. In between the vertebrae are shock absorbers called disks. Back pain can occur from an injury to the vertebrae or when a disk bulges or herniates.   Low back pain is usually caused when a ligament or muscle holding a vertebra in its proper position is strained. Vertebrae are bones that make up the spinal column through which the spinal cord passes. When these muscles or ligaments become weak or strained, the spine loses its stability, resulting in pain.   Low back pain can occur if your job involves lifting and carrying heavy objects, or if you spend a lot of time sitting or standing in one position or bending over. It can be caused by a fall or by unusually strenuous exercise. It can be brought on by the tension and stress that cause headaches in some people. It can even be brought on by violent sneezing or coughing.   People who are overweight may have low back pain because of the added stress on their back.   Back pain may occur when the muscles, joints, bones, and connective tissues of the back become inflamed as a result of an infection or an immune system problem. Arthritic disorders as well as some congenital and degenerative conditions may cause back pain.   Back pain accompanied by loss of bladder or bowel control, trouble moving your legs, or numbness or tingling in your arms or legs requires immediate medical treatment.   What are the symptoms?   Symptoms include:   pain in the back or legs   stiffness, spasm, or limited motion   The pain may be constant or may happen only in certain positions. It may get worse when you cough, sneeze, bend, twist, or strain during a bowel movement. The pain may be in only one spot or may  spread to other areas, most commonly down the buttocks and into the back of the thigh.   A low back strain typically does not produce pain past the knee into the calf or foot. Tingling or numbness in the calf or foot may indicate a herniated disk or pinched nerve.   Be sure to see your healthcare provider if:   You have weakness in your leg, especially if you cannot lift your foot, because this may be a sign of nerve damage.   You have new bowel or bladder problems as well as back pain, which may be a sign of severe injury to your spinal cord.   You have pain that gets worse despite treatment.   How is it diagnosed?   Your healthcare provider will review your medical history and examine you. You may have X-rays, an MRI, CT scan, or a bone scan.   How is it treated?   To treat this condition:   Put an ice pack, gel pack, or package of frozen vegetables, wrapped in a cloth on the area every 3 to 4 hours, for up to 20 minutes at a time for the first 2 or 3 days.   Use a heating pad or hot water bottle. Don't let the heating pad get too hot, and don't fall asleep with it. You could get a burn.   Rest in bed on a firm mattress. Often it helps to lie on your back with your knees raised on a pillow. However, some people prefer to lie on their side with their knees bent. It's best to try to stay active, so try not to rest in bed longer than 1 to 2 days.   Take muscle relaxants as recommended by your healthcare provider.   Take an anti-inflammatory such as ibuprofen, or other medicine as directed by your provider. Nonsteroidal anti-inflammatory medicines (NSAIDs) may cause stomach bleeding and other problems. These risks increase with age. Read the label and take as directed. Unless recommended by your healthcare provider, do not take for more than 10 days.   Get a back massage by a trained person.   Wear a belt or corset to support your back.   Do the exercises recommended by your provider. Your provider may also prescribe  physical therapy.   Talk with a counselor, if your back pain is related to tension caused by emotional problems.   When the pain is gone, ask your healthcare provider about starting an exercise program such as the following:   Exercise moderately every day, using stretching and warm-up exercises suggested by your provider or physical therapist.   Exercise vigorously for about 30 minutes 3 times a week by walking, swimming, using a stationary bicycle, or doing low-impact aerobics.   Exercising regularly will not only help your back, it will also help keep you healthier overall.   How long will the effects last?   The effects of back pain last as long as the cause exists or until your body recovers from the strain, usually a day or two but sometimes weeks.   How can I take care of myself?   In addition to the treatment described above, keep in mind these suggestions:   Practice good posture. Stand with your head up, shoulders straight, chest forward, weight balanced evenly on both feet, and pelvis tucked in.   Lose weight if you are overweight   Keep your core muscles strong. These are your abdominal and back muscles.   Sleep without a pillow under your head.   Pain is the best way to  the pace you should set in increasing your activity and exercise. Minor discomfort, stiffness, soreness, and mild aches need not interfere with activity. However, limit your activities temporarily if:   Your symptoms return.   The pain increases when you are more active.   The pain increases within 24 hours after a new or higher level of activity.   When can I return to my normal activities?   Everyone recovers from an injury at a different rate. Return to your activities depends on how soon your back recovers, not by how many days or weeks it has been since your injury has occurred. In general, the longer you have symptoms before you start treatment, the longer it will take to get better. The goal is to return to your normal  activities as soon as is safely possible. If you return too soon you may worsen your injury.   It is important that you have fully recovered from your low back pain before you return to any strenuous activity. You must be able to have the same range of motion that you had before your injury. You must be able to walk and twist without pain.   What can I do to help prevent low back pain?   You can reduce the strain on your back by doing the following:   Don't push with your arms when you move a heavy object. Turn around and push backwards so the strain is taken by your legs.   Whenever you sit, sit in a straight-backed chair and hold your spine against the back of the chair.   Bend your knees and hips and keep your back straight when you lift a heavy object.   Avoid lifting heavy objects higher than your waist.   Hold packages you carry close to your body, with your arms bent.   Use a footrest for one foot when you stand or sit in one spot for a long time. This keeps your back straight.   Bend your knees when you bend over.   Sit close to the pedals when you drive and use your seat belt and a hard backrest or pillow.   Lie on your side with your knees bent when you sleep or rest. It may help to put a pillow between your knees.   Put a pillow under your knees when you sleep on your back.   Raise the foot of the bed 8 inches to discourage sleeping on your stomach unless you have other problems that require that you keep your head elevated.   To rest your back, hold each of these positions for 5?minutes or longer:   Lie on your back, bend your knees, and put pillows under your knees.   Lie on your back on the floor with a pillow under your neck. Bend your knees to a 90-degree angle, and put your lower legs and feet on a chair.   Lie on your back, bend your knees, and bring one knee up to your chest and hold it there. Repeat with the other knee, then bring both knees to your chest. When holding your knee to your chest,  grab your thigh rather than your lower leg to avoid over flexing your knee.     Published by [employer].  This content is reviewed periodically and is subject to change as new health information becomes available. The information is intended to inform and educate and is not a replacement for medical evaluation, advice, diagnosis or treatment by a healthcare professional.   Developed by Tanya Corey RN, [place], and [facility].   ? 2010 [facility] and/or its affiliates. All Rights Reserved.           Low Back Pain Exercise          Standing hamstring stretch: Put the heel of one leg on a stool about 15 inches high. Keep your leg straight. Lean forward, bending at the hips until you feel a mild stretch in the back of your thigh. Make sure you do not roll your shoulders or bend at the waist when doing this. You want to stretch your leg, not your lower back. Hold the stretch for 15 to 30 seconds. Repeat with each leg 3 times.   Cat and camel: Get down on your hands and knees. Let your stomach sag, allowing your back to curve downward. Hold this position for 5 seconds. Then arch your back and hold for 5 seconds. Do 3 sets of 10.   Quadruped arm and leg raise: Get down on your hands and knees. Pull in your belly button and tighten your abdominal muscles to stiffen your spine. While keeping your abdominals tight, raise one arm and the opposite leg away from you. Hold this position for 5 seconds. Lower your arm and leg slowly and change sides. Do this 10 times on each side.   Pelvic tilt: Lie on your back with your knees bent and your feet flat on the floor. Tighten your abdominal muscles and push your lower back into the floor. Hold this position for 5 seconds, then relax. Do 3 sets of 10.   Partial curl: Lie on your back with your knees bent and your feet flat on the floor. Tighten your stomach muscles. Tuck your chin to your chest. With your hands stretched out in front of you, curl your upper body forward until your  shoulders clear the floor. Hold this position for 3 seconds. Don't hold your breath. It helps to breathe out as you lift your shoulders up. Relax back to the floor. Repeat 10 times. Build to 3 sets of 10. To challenge yourself, clasp your hands behind your head and keep your elbows out to the side.   Gluteal stretch: Lie on your back with both knees bent. Rest the ankle of one leg over the knee of your other leg. Grasp the thigh of the bottom leg and pull toward your chest. You will feel a stretch along the buttocks and possibly along the outside of your hip. Hold the stretch for 15 to 30 seconds. Repeat 3 times with each leg.   Extension exercise:   0. Lie face down on the floor for 5 minutes. If this hurts too much, lie face down with a pillow under your stomach. This should relieve your leg or back pain. When you can lie on your stomach for 5 minutes without a pillow, you can continue with Part B of this exercise.   0. After lying on your stomach for 5 minutes, prop yourself up on your elbows for another 5 minutes. If you can do this without having more leg or buttock pain, you can start doing part C of this exercise.   0. Lie on your stomach with your hands under your shoulders. Then press down on your hands and extend your elbows while keeping your hips flat on the floor. Hold for 1 second and lower yourself to the floor. Do 3 to 5 sets of 10 repetitions. Rest for 1 minute between sets. You should have no pain in your legs when you do this, but it is normal to feel some pain in your lower back.   Do this exercise several times a day.   Side plank: Lie on your side with your legs, hips, and shoulders in a straight line. Prop yourself up onto your forearm so your elbow is directly under your shoulder. Lift your hips off the floor and balance on your forearm and the outside of your foot. Try to hold this position for 15 seconds, then slowly lower your hip to the ground. Switch sides and repeat. Work up to holding  for 1 minute or longer. This exercise can be made easier by starting with your knees and hips flexed toward your chest.   Published by [employer].  This content is reviewed periodically and is subject to change as new health information becomes available. The information is intended to inform and educate and is not a replacement for medical evaluation, advice, diagnosis or treatment by a healthcare professional.   Written by Elif Soliman, MS, PT, and Tanya Patel PT, [facility], [facility], for [facility]   ? 2010 [facility] and/or its affiliates. All Rights Reserved.         Copyright   [employer] 2011

## 2017-05-25 ENCOUNTER — THERAPY VISIT (OUTPATIENT)
Dept: PHYSICAL THERAPY | Facility: CLINIC | Age: 52
End: 2017-05-25
Payer: COMMERCIAL

## 2017-05-25 DIAGNOSIS — M54.16 LUMBAR RADICULOPATHY: Primary | ICD-10-CM

## 2017-05-25 PROCEDURE — 97161 PT EVAL LOW COMPLEX 20 MIN: CPT | Mod: GP | Performed by: PHYSICAL THERAPIST

## 2017-05-25 PROCEDURE — 97110 THERAPEUTIC EXERCISES: CPT | Mod: GP | Performed by: PHYSICAL THERAPIST

## 2017-05-25 NOTE — PROGRESS NOTES
Subjective:    Patient is a 51 year old female presenting with rehab left ankle/foot hpi.                                        Medical allergies: N/A.  Surgical history:  .    Current occupation is /.    Primary job tasks include:  Prolonged standing and lifting (carrying, pushing, pulling).                                Objective:    System    Physical Exam    General     ROS    Assessment/Plan:

## 2017-05-25 NOTE — PROGRESS NOTES
Subjective:    Patient is a 51 year old female presenting with rehab back hpi.           Pt describes intermittent pain in the left posterior thigh.  Felt when sitting.  Can have pain in the left buttock, hip, and lower leg if she sits too long.  Began insidiously approximately 6 weeks ago. .    Site of Pain: Left posterior thigh.  Radiates to:  Lower leg left.  Pain is described as sharp and burning and is intermittent and reported as 10/10.   Pain is the same all the time.  Symptoms are exacerbated by sitting and relieved by activity/movement (standing up).  Since onset symptoms are gradually worsening.        General health as reported by patient is excellent.                  Barriers include:  None as reported by the patient.    Red flags:  None as reported by the patient.                        Objective:    Standing Alignment:        Lumbar:  Normal                Flexibility/Screens:   Negative screens: Hip or Knee                    Lumbar/SI Evaluation    Lumbar Myotomes:  normal            Lumbar DTR's:    L4 (Quad):  Left:  2   Right:  2  S1 (Achilles):  Left:  1   Right:  2    Lumbar Dermtomes:  normal                Neural Tension/Mobility:    Left side:  Slump positive.                                                            Tina Lumbar Evaluation    Posture:  Sitting: good  Standing: good  Lordosis: WNL  Lateral Shift: no  Correction of Posture: better    Movement Loss:  Flexion (Flex): mod and pain  Extension (EXT): min  Side Glide R (SG R): nil  Side Glide L (SG L): nil  Test Movements:  FIS: During: produces  After: no worse    Repeat FIS: During: produces  After: worse    EIS: During: no effect  After: no effect    Repeat EIS: During: no effect  After: no effect    JUAN: During: no effect  After: no effect    Repeat JUAN: During: no effect  After: no effect    EIL: During: no effect  After: no effect    Repeat EIL: During: no effect  After: no effect  Mechanical Response:  IncROM        Conclusion: derangement  Principle of Treatment:      Extension: Yes                                           ROS    Assessment/Plan:      Patient is a 51 year old female with lumbar complaints.    Patient has the following significant findings with corresponding treatment plan.                Diagnosis 1:  Lumbar radiculopathy to left leg  Pain -  manual therapy, self management, education, directional preference exercise and home program  Decreased ROM/flexibility - manual therapy, therapeutic exercise and home program    Therapy Evaluation Codes:   1) History comprised of:   Personal factors that impact the plan of care:      None.    Comorbidity factors that impact the plan of care are:      None.     Medications impacting care: None.  2) Examination of Body Systems comprised of:   Body structures and functions that impact the plan of care:      Lumbar spine.   Activity limitations that impact the plan of care are:      Bending and Sitting.  3) Clinical presentation characteristics are:   Stable/Uncomplicated.  4) Decision-Making    Low complexity using standardized patient assessment instrument and/or measureable assessment of functional outcome.  Cumulative Therapy Evaluation is: Low complexity.    Previous and current functional limitations:  (See Goal Flow Sheet for this information)    Short term and Long term goals: (See Goal Flow Sheet for this information)     Communication ability:  Patient appears to be able to clearly communicate and understand verbal and written communication and follow directions correctly.  Treatment Explanation - The following has been discussed with the patient:   RX ordered/plan of care  Anticipated outcomes  Possible risks and side effects  This patient would benefit from PT intervention to resume normal activities.   Rehab potential is good.    Frequency:  1 X week, once daily  Duration:  for 4 weeks  Discharge Plan:  Achieve all LTG.  Independent in home  treatment program.  Reach maximal therapeutic benefit.    Please refer to the daily flowsheet for treatment today, total treatment time and time spent performing 1:1 timed codes.

## 2017-06-05 ENCOUNTER — THERAPY VISIT (OUTPATIENT)
Dept: PHYSICAL THERAPY | Facility: CLINIC | Age: 52
End: 2017-06-05
Payer: COMMERCIAL

## 2017-06-05 DIAGNOSIS — M54.16 LUMBAR RADICULOPATHY: ICD-10-CM

## 2017-06-05 PROCEDURE — 97110 THERAPEUTIC EXERCISES: CPT | Mod: GP | Performed by: PHYSICAL THERAPIST

## 2017-06-05 PROCEDURE — 97112 NEUROMUSCULAR REEDUCATION: CPT | Mod: GP | Performed by: PHYSICAL THERAPIST

## 2017-09-12 PROBLEM — M54.16 LUMBAR RADICULOPATHY: Status: RESOLVED | Noted: 2017-05-25 | Resolved: 2017-09-12

## 2017-09-12 NOTE — PROGRESS NOTES
Subjective:    HPI                    Objective:    System    Physical Exam    General     ROS    Assessment/Plan:      DISCHARGE REPORT    Progress reporting period is from 5/25/17 to 6/5/17 (2 visits).       SUBJECTIVE  Subjective changes noted by patient:  Evangelina was doing better at her last visit on 6/5/17 and did not attend further PT.  Her status at that time was as follows:  Subjective: Having pain less often and is now more in left LB and buttock and not as much in the post thigh.  Sitting is still the primary cause of pain.    Current pain level is  Current Pain level: 4/10.     Previous pain level was   Initial Pain level: 10/10.   Changes in function:  Unknown.  Adverse reaction to treatment or activity: None    OBJECTIVE  Changes noted in objective findings:  The objective findings below are from DOS 6/5/17.  Objective: Sxs abolish with prone positioning and press-ups.     ASSESSMENT/PLAN  Updated problem list and treatment plan: Diagnosis 1:  Lumbar radiculopathy to left LE    STG/LTGs have been met or progress has been made towards goals:  Unknown.  Assessment of Progress: The patient has not returned to therapy. Current status is unknown.  Self Management Plans:  Patient has been instructed in a home treatment program.    Evangelina continues to require the following intervention to meet STG and LTG's:  PT intervention is no longer required to meet STG/LTG.    Recommendations:  This patient is ready to be discharged from therapy and continue their home treatment program.    Please refer to the daily flowsheet for treatment today, total treatment time and time spent performing 1:1 timed codes.

## 2018-03-29 ENCOUNTER — ALLIED HEALTH/NURSE VISIT (OUTPATIENT)
Dept: NURSING | Facility: CLINIC | Age: 53
End: 2018-03-29
Payer: COMMERCIAL

## 2018-03-29 DIAGNOSIS — Z23 ENCOUNTER FOR IMMUNIZATION: Primary | ICD-10-CM

## 2018-03-29 PROCEDURE — 90715 TDAP VACCINE 7 YRS/> IM: CPT

## 2018-03-29 PROCEDURE — 90471 IMMUNIZATION ADMIN: CPT

## 2018-03-29 NOTE — NURSING NOTE
Screening Questionnaire for Adult Immunization    Are you sick today?   No   Do you have allergies to medications, food, a vaccine component or latex?   No   Have you ever had a serious reaction after receiving a vaccination?   No   Do you have a long-term health problem with heart disease, lung disease, asthma, kidney disease, metabolic disease (e.g. diabetes), anemia, or other blood disorder?   No   Do you have cancer, leukemia, HIV/AIDS, or any other immune system problem?   No   In the past 3 months, have you taken medications that affect  your immune system, such as prednisone, other steroids, or anticancer drugs; drugs for the treatment of rheumatoid arthritis, Crohn s disease, or psoriasis; or have you had radiation treatments?   No   Have you had a seizure, or a brain or other nervous system problem?   No   During the past year, have you received a transfusion of blood or blood     products, or been given immune (gamma) globulin or antiviral drug?   No   For women: Are you pregnant or is there a chance you could become        pregnant during the next month?   No   Have you received any vaccinations in the past 4 weeks?   No     Immunization questionnaire answers were all negative.        Per orders of Dr. HARRIS , injection of TDAP given by Jeremías Gallegos. Patient instructed to remain in clinic for 15 minutes afterwards, and to report any adverse reaction to me immediately.       Screening performed by Jeremías Gallegos on 3/29/2018 at 8:47 AM.

## 2018-03-29 NOTE — MR AVS SNAPSHOT
"              After Visit Summary   3/29/2018    Evangelina De Anda    MRN: [id number]           Patient Information     Date Of Birth          1965        Visit Information        Provider Department      3/29/2018 10:00 AM LV MA/LPN [facility]        Today's Diagnoses     Encounter for immunization    -  1       Follow-ups after your visit        Your next 10 appointments already scheduled     Mar 29, 2018 10:00 AM CDT   Nurse Only with LV MA/LPN   [facility] ([facility])    [address] [address] [phone]            Apr 09, 2018 11:15 AM CDT   (Arrive by 11:00 AM)   MA SCREENING DIGITAL BILATERAL with LVMA1   [facility] ([facility])    [address] [address] [phone]           Do not use any powder, lotion or deodorant under your arms or on your breast. If you do, we will ask you to remove it before your exam.  Wear comfortable, two-piece clothing.  If you have any allergies, tell your care team.  Bring any previous mammograms from other facilities or have them mailed to the breast center. Three-dimensional (3D) mammograms are available at [place] locations in [facility], [facility], [place], [place], and [place]. [facility] locations include [place] and [facility] in [place]. Benefits of 3D mammograms include: - Improved rate of cancer detection - Decreases your chance of having to go back for more tests, which means fewer: - \"False-positive\" results (This means that there is an abnormal area but it isn't cancer.) - Invasive testing procedures, such as a biopsy or surgery - Can provide clearer images of the breast if you have dense breast tissue. 3D mammography is an optional exam that anyone can have with a 2D mammogram. It doesn't replace or take the place of a 2D mammogram. 2D mammograms remain an effective screening " test for all women.  Not all insurance companies cover the cost of a 3D mammogram. Check with your insurance.              Who to contact     If you have questions or need follow up information about today's clinic visit or your schedule please contact [facility] directly at [phone].  Normal or non-critical lab and imaging results will be communicated to you by MyChart, letter or phone within 4 business days after the clinic has received the results. If you do not hear from us within 7 days, please contact the clinic through Elegant Servicehart or phone. If you have a critical or abnormal lab result, we will notify you by phone as soon as possible.  Submit refill requests through Zentila or call your pharmacy and they will forward the refill request to us. Please allow 3 business days for your refill to be completed.          Additional Information About Your Visit        MyChart Information     Zentila gives you secure access to your electronic health record. If you see a primary care provider, you can also send messages to your care team and make appointments. If you have questions, please call your primary care clinic.  If you do not have a primary care provider, please call [phone] and they will assist you.        Care EveryWhere ID     This is your Care EveryWhere ID. This could be used by other organizations to access your [place] medical records  [id number]         Blood Pressure from Last 3 Encounters:   05/22/17 100/60   04/10/17 90/60   03/20/17 100/56    Weight from Last 3 Encounters:   05/22/17 141 lb 3.2 oz (64 kg)   04/10/17 138 lb 8 oz (62.8 kg)   03/20/17 144 lb 8 oz (65.5 kg)              We Performed the Following     TDAP VACCINE (ADACEL)        Primary Care Provider Office Phone # Fax #    Luis Antonio Mejia [phone] [address]  [facility] [address]        Equal Access to Services     PRETTY HAN AH: peter Montenegro,  shilo alejandrophi teresaminerva lemus tonyin hayaan adeeg kharash la'aan ah. So [facility] [phone].    ATENCIÓN: Si lizbet nguyen, tiene a madrid disposición servicios gratuitos de asistencia lingüística. Marcia al [phone].    We comply with applicable federal civil rights laws and [place] laws. We do not discriminate on the basis of race, color, national origin, age, disability, sex, sexual orientation, or gender identity.            Thank you!     Thank you for choosing [facility]  for your care. Our goal is always to provide you with excellent care. Hearing back from our patients is one way we can continue to improve our services. Please take a few minutes to complete the written survey that you may receive in the mail after your visit with us. Thank you!             Your Updated Medication List - Protect others around you: Learn how to safely use, store and throw away your medicines at www.disposemymeds.org.          This list is accurate as of 3/29/18  8:49 AM.  Always use your most recent med list.                   Brand Name Dispense Instructions for use Diagnosis    clobetasol 0.05 % cream    TEMOVATE    60 g    Apply sparingly to affected area  daily for 3 months    Lichen sclerosus et atrophicus       ibuprofen 800 MG tablet    ADVIL/MOTRIN    90 tablet    Take 1 tablet (800 mg) by mouth every 8 hours as needed for moderate pain    Vaginal laceration, sequela       Multi-vitamin Tabs tablet   Generic drug:  multivitamin, therapeutic with minerals     0    1 TABLET DAILY

## 2018-04-07 ENCOUNTER — HEALTH MAINTENANCE LETTER (OUTPATIENT)
Age: 53
End: 2018-04-07

## 2018-04-09 ENCOUNTER — RADIANT APPOINTMENT (OUTPATIENT)
Dept: MAMMOGRAPHY | Facility: CLINIC | Age: 53
End: 2018-04-09
Attending: FAMILY MEDICINE
Payer: COMMERCIAL

## 2018-04-09 DIAGNOSIS — Z12.31 ENCOUNTER FOR SCREENING MAMMOGRAM FOR HIGH-RISK PATIENT: ICD-10-CM

## 2018-04-09 PROCEDURE — 77067 SCR MAMMO BI INCL CAD: CPT | Mod: TC

## 2018-04-24 ENCOUNTER — OFFICE VISIT (OUTPATIENT)
Dept: FAMILY MEDICINE | Facility: CLINIC | Age: 53
End: 2018-04-24
Payer: COMMERCIAL

## 2018-04-24 VITALS
BODY MASS INDEX: 20.7 KG/M2 | HEART RATE: 56 BPM | OXYGEN SATURATION: 99 % | DIASTOLIC BLOOD PRESSURE: 64 MMHG | SYSTOLIC BLOOD PRESSURE: 104 MMHG | TEMPERATURE: 97.7 F | HEIGHT: 67 IN | WEIGHT: 131.9 LBS

## 2018-04-24 DIAGNOSIS — M77.12 LATERAL EPICONDYLITIS OF LEFT ELBOW: Primary | ICD-10-CM

## 2018-04-24 PROCEDURE — 99213 OFFICE O/P EST LOW 20 MIN: CPT | Performed by: FAMILY MEDICINE

## 2018-04-24 NOTE — PROGRESS NOTES
SUBJECTIVE:   Evangelina De Anda is a 52 year old female who presents to clinic today for the following health issues:    Patient reports left elbow pain for the past two months. Her elbow is stiff and sore every morning. The pain occurs when lifting objects and she describes it as a tingling or burning sensation. 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 The pain began when she started teaching the class. She has iced and rested the elbow. Denies arm numbness.     Problem list and histories reviewed & adjusted, as indicated.  Additional history: as documented    Patient Active Problem List   Diagnosis     Tobacco use disorder     Adjustment disorder with mixed anxiety and depressed mood     Family history of colon cancer     Family history of coronary artery disease     Tubular adenoma     Anxiety     Unexplained endometrial cells on cervical Pap smear     MAYRA II (vulvar intraepithelial neoplasia II)     Past Surgical History:   Procedure Laterality Date     APPENDECTOMY      age 6 yrs.     COLONOSCOPY  2015    Dr. Estella LAROSE     EXCISE VULVA WIDE LOCAL  7/15/2014    Procedure: EXCISE VULVA WIDE LOCAL;  Surgeon: Melinda Whitten DO;  Location: [facility] OR     GYN SURGERY           SURGICAL HISTORY OF -       C section     TONSILLECTOMY      T&A as a child       Social History   Substance Use Topics     Smoking status: Current Every Day Smoker     Types: Cigarettes     Smokeless tobacco: Never Used      Comment: 1 cig/day      Alcohol use No      Comment: stopped 2014     Family History   Problem Relation Age of Onset     C.A.D. Father 65     heart attack and quadruble bypass     CANCER Father      spleen     HEART DISEASE Father      Hypertension Paternal Grandmother      CEREBROVASCULAR DISEASE Maternal Grandmother      Cancer - colorectal Mother      in her 50's.     Neurologic Disorder Mother      MS- at age of 65     HEART DISEASE Sister 38       "from massive heart attack at age of 38     Family History Negative Brother      Family History Negative Sister            Reviewed and updated as needed this visit by clinical staff  Tobacco  Allergies  Meds       Reviewed and updated as needed this visit by Provider         ROS:  MUSCULOSKELETAL: as noted above   NEURO: as noted above     This document serves as a record of the services and decisions personally performed and made by Luis Antonio Mejia MD. It was created on his behalf by Mary Bernal, a trained medical scribe. The creation of this document is based on the provider's statements to the medical scribe.  Mary Bernal 11:01 AM April 24, 2018    OBJECTIVE:     /64 (BP Location: Right arm, Patient Position: Chair, Cuff Size: Adult Regular)  Pulse 56  Temp 97.7  F (36.5  C) (Oral)  Ht 1.702 m (5' 7\")  Wt 59.8 kg (131 lb 14.4 oz)  SpO2 99%  Breastfeeding? No  BMI 20.66 kg/m2  Body mass index is 20.66 kg/(m^2).  GENERAL: healthy, alert and no distress  MS: tenderness over lateral epicondyle, pain with resisted extension of the wrist     ASSESSMENT/PLAN:     1. Lateral epicondylitis of left elbow  Discussed using tennis elbow strap for forearm, NSAIDS as needed, exercises given, avoid exacerbating activities, ice. Return if not improving in 2 weeks to consider corticosteroid injection, physical therapy.   - [facility], HAND, AND CHIROPRACTIC REFERRAL    The information in this document, created by the medical scribe for me, accurately reflects the services I personally performed and the decisions made by me. I have reviewed and approved this document for accuracy prior to leaving the patient care area.  April 24, 2018 11:13 AM    Luis Antonio Mejia MD  [facility]    "

## 2018-04-24 NOTE — MR AVS SNAPSHOT
After Visit Summary   4/24/2018    Evangelina De Anda    MRN: [id number]           Patient Information     Date Of Birth          1965        Visit Information        Provider Department      4/24/2018 10:40 AM Luis Antonio Mejia MD [facility]        Today's Diagnoses     Lateral epicondylitis of left elbow    -  1      Care Instructions                 Lateral Epicondylitis (Tennis Elbow)   Rehabilitation Exercises   You may do the stretching exercises right away. You may do the strengthening exercises when stretching is nearly painless.   Stretching exercises     Wrist range of motion: Bend your wrist forward and backward as far as you can. Do 3 sets of 10.     Pronation and supination of the forearm: With your elbow bent 90 , turn your palm upward and hold for 5 seconds. Slowly turn your palm downward and hold for 5 seconds. Make sure you keep your elbow at your side and bent 90  throughout this exercise. Do 3 sets of 10.     Elbow range of motion: Gently bring your palm up toward your shoulder and bend your elbow as far as you can. Then straighten your elbow as far as you can 10 times. Do 3 sets of 10.   Strengthening exercises     Wrist flexion exercise: Hold a can or hammer handle in your hand with your palm facing up. Bend your wrist upward. Slowly lower the weight and return to the starting position. Do 3 sets of 10. Gradually increase the weight of the can or weight you are holding.     Wrist extension exercise: Hold a soup can or hammer handle in your hand with your palm facing down. Slowly bend your wrist upward. Slowly lower the weight down into the starting position. Do 3 sets of 10. Gradually increase the weight of the object you are holding.     Wrist radial deviation strengthening: Put your wrist in the sideways position with your thumb up. Hold a can of soup or a hammer handle and gently bend your wrist up, with the thumb reaching toward the ceiling. Slowly lower  to the starting position. Do not move your forearm throughout this exercise. Do 3 sets of 10.     Forearm pronation and supination strengthening: Hold a soup can or hammer handle in your hand and bend your elbow 90 . Slowly rotate your hand with your palm upward and then palm down. Do 3 sets of 10.     Wrist extension (with broom handle): Stand up and hold a broom handle in both hands. With your arms at shoulder level, elbows straight and palms down, roll the broom handle backward in your hand as if you are reeling something in using a broom handle. Do 3 sets of 10.   Written by Elif Soliman, MS, PT, for [employer].   Published by [employer].   This content is reviewed periodically and is subject to change as new health information becomes available. The information is intended to inform and educate and is not a replacement for medical evaluation, advice, diagnosis or treatment by a healthcare professional.   Sports Medicine Advisor 2003.1 Index  Sports Medicine Advisor 2003.1 Credits   Copyright   2003 [employer]. All rights reserved.                      Follow-ups after your visit        Additional Services     [facility], HAND, AND CHIROPRACTIC REFERRAL       **This order will print in the [facility] Scheduling Office**    Physical Therapy, Hand Therapy and Chiropractic Care are available through:    *[facility]  *[facility]  *[facility]    Call one number to schedule at any of the above locations: [phone].    Your provider has referred you to: Physical Therapy at [facility] or [facility]    Indication/Reason for Referral: Elbow Pain - lateral epicondylitis  Onset of Illness: 6 weeks  Therapy Orders: Evaluate and Treat  Special Programs: None  Special Request: Quentin Santacruz      Additional Comments for the Therapist or Chiropractor:     Please be aware that coverage of these services is subject to the terms  "and limitations of your health insurance plan.  Call member services at your health plan with any benefit or coverage questions.      Please bring the following to your appointment:    *Your personal calendar for scheduling future appointments  *Comfortable clothing                  Who to contact     If you have questions or need follow up information about today's clinic visit or your schedule please contact [facility] directly at [phone].  Normal or non-critical lab and imaging results will be communicated to you by Chimerixhart, letter or phone within 4 business days after the clinic has received the results. If you do not hear from us within 7 days, please contact the clinic through Chimerixhart or phone. If you have a critical or abnormal lab result, we will notify you by phone as soon as possible.  Submit refill requests through Enject or call your pharmacy and they will forward the refill request to us. Please allow 3 business days for your refill to be completed.          Additional Information About Your Visit        Chimerixhart Information     Enject gives you secure access to your electronic health record. If you see a primary care provider, you can also send messages to your care team and make appointments. If you have questions, please call your primary care clinic.  If you do not have a primary care provider, please call [phone] and they will assist you.        Care EveryWhere ID     This is your Care EveryWhere ID. This could be used by other organizations to access your [place] medical records  [id number]        Your Vitals Were     Pulse Temperature Height Pulse Oximetry Breastfeeding? BMI (Body Mass Index)    56 97.7  F (36.5  C) (Oral) 5' 7\" (1.702 m) 99% No 20.66 kg/m2       Blood Pressure from Last 3 Encounters:   04/24/18 104/64   05/22/17 100/60   04/10/17 90/60    Weight from Last 3 Encounters:   04/24/18 131 lb 14.4 oz (59.8 kg)   05/22/17 141 lb 3.2 oz (64 kg)   04/10/17 " 138 lb 8 oz (62.8 kg)              We Performed the Following     [facility] PT, HAND, AND CHIROPRACTIC REFERRAL        Primary Care Provider Office Phone # Fax #    Luis Antonio Mejia [phone] [address]  [facility] [address]        Equal Access to Services     EMILIANO EMELY : Hadii aad ku hadasho Soomaali, waaxda luqadaha, qaybta kaalmada adeegyada, waxay idiin hayaan adeeg khbrittsh la'enidn ah. So [facility] [phone].    ATENCIÓN: Si habla español, tiene a madrid disposición servicios gratuitos de asistencia lingüística. Llame al [phone].    We comply with applicable federal civil rights laws and [place] laws. We do not discriminate on the basis of race, color, national origin, age, disability, sex, sexual orientation, or gender identity.            Thank you!     Thank you for choosing [facility]  for your care. Our goal is always to provide you with excellent care. Hearing back from our patients is one way we can continue to improve our services. Please take a few minutes to complete the written survey that you may receive in the mail after your visit with us. Thank you!             Your Updated Medication List - Protect others around you: Learn how to safely use, store and throw away your medicines at www.disposemymeds.org.          This list is accurate as of 4/24/18 11:12 AM.  Always use your most recent med list.                   Brand Name Dispense Instructions for use Diagnosis    clobetasol 0.05 % cream    TEMOVATE    60 g    Apply sparingly to affected area  daily for 3 months    Lichen sclerosus et atrophicus       ibuprofen 800 MG tablet    ADVIL/MOTRIN    90 tablet    Take 1 tablet (800 mg) by mouth every 8 hours as needed for moderate pain    Vaginal laceration, sequela       Multi-vitamin Tabs tablet   Generic drug:  multivitamin, therapeutic with minerals     0    1 TABLET DAILY

## 2018-04-24 NOTE — PATIENT INSTRUCTIONS
Lateral Epicondylitis (Tennis Elbow)   Rehabilitation Exercises   You may do the stretching exercises right away. You may do the strengthening exercises when stretching is nearly painless.   Stretching exercises     Wrist range of motion: Bend your wrist forward and backward as far as you can. Do 3 sets of 10.     Pronation and supination of the forearm: With your elbow bent 90 , turn your palm upward and hold for 5 seconds. Slowly turn your palm downward and hold for 5 seconds. Make sure you keep your elbow at your side and bent 90  throughout this exercise. Do 3 sets of 10.     Elbow range of motion: Gently bring your palm up toward your shoulder and bend your elbow as far as you can. Then straighten your elbow as far as you can 10 times. Do 3 sets of 10.   Strengthening exercises     Wrist flexion exercise: Hold a can or hammer handle in your hand with your palm facing up. Bend your wrist upward. Slowly lower the weight and return to the starting position. Do 3 sets of 10. Gradually increase the weight of the can or weight you are holding.     Wrist extension exercise: Hold a soup can or hammer handle in your hand with your palm facing down. Slowly bend your wrist upward. Slowly lower the weight down into the starting position. Do 3 sets of 10. Gradually increase the weight of the object you are holding.     Wrist radial deviation strengthening: Put your wrist in the sideways position with your thumb up. Hold a can of soup or a hammer handle and gently bend your wrist up, with the thumb reaching toward the ceiling. Slowly lower to the starting position. Do not move your forearm throughout this exercise. Do 3 sets of 10.     Forearm pronation and supination strengthening: Hold a soup can or hammer handle in your hand and bend your elbow 90 . Slowly rotate your hand with your palm upward and then palm down. Do 3 sets of 10.     Wrist extension (with broom handle): Stand up and hold a broom handle in  both hands. With your arms at shoulder level, elbows straight and palms down, roll the broom handle backward in your hand as if you are reeling something in using a broom handle. Do 3 sets of 10.   Written by Elif Soliman MS, PT, for [employer].   Published by [employer].   This content is reviewed periodically and is subject to change as new health information becomes available. The information is intended to inform and educate and is not a replacement for medical evaluation, advice, diagnosis or treatment by a healthcare professional.   Sports Medicine Advisor 2003.1 Index  Sports Medicine Advisor 2003.1 Credits   Copyright   2003 [employer]. All rights reserved.

## 2018-04-25 ENCOUNTER — OFFICE VISIT (OUTPATIENT)
Dept: FAMILY MEDICINE | Facility: CLINIC | Age: 53
End: 2018-04-25
Payer: COMMERCIAL

## 2018-04-25 VITALS
TEMPERATURE: 98 F | HEART RATE: 70 BPM | HEIGHT: 67 IN | OXYGEN SATURATION: 98 % | SYSTOLIC BLOOD PRESSURE: 108 MMHG | WEIGHT: 131 LBS | BODY MASS INDEX: 20.56 KG/M2 | DIASTOLIC BLOOD PRESSURE: 60 MMHG

## 2018-04-25 DIAGNOSIS — M77.12 LATERAL EPICONDYLITIS OF LEFT ELBOW: Primary | ICD-10-CM

## 2018-04-25 PROCEDURE — 20551 NJX 1 TENDON ORIGIN/INSJ: CPT | Performed by: FAMILY MEDICINE

## 2018-04-25 NOTE — MR AVS SNAPSHOT
After Visit Summary   4/25/2018    Evangeilna De Anda    MRN: [id number]           Patient Information     Date Of Birth          1965        Visit Information        Provider Department      4/25/2018 2:40 PM Luis Antonio Mejia MD [facility]        Today's Diagnoses     Lateral epicondylitis of left elbow    -  1       Follow-ups after your visit        Your next 10 appointments already scheduled     Apr 26, 2018 12:20 PM CDT   (Arrive by 12:05 PM)   SHAE Extremity with Adis Geller PT   [facility] ([facility]    [address] [place] [address] [phone]              Who to contact     If you have questions or need follow up information about today's clinic visit or your schedule please contact [facility] directly at [phone].  Normal or non-critical lab and imaging results will be communicated to you by MyChart, letter or phone within 4 business days after the clinic has received the results. If you do not hear from us within 7 days, please contact the clinic through MyChart or phone. If you have a critical or abnormal lab result, we will notify you by phone as soon as possible.  Submit refill requests through [employer] or call your pharmacy and they will forward the refill request to us. Please allow 3 business days for your refill to be completed.          Additional Information About Your Visit        MyChart Information     [employer] gives you secure access to your electronic health record. If you see a primary care provider, you can also send messages to your care team and make appointments. If you have questions, please call your primary care clinic.  If you do not have a primary care provider, please call [phone] and they will assist you.        Care EveryWhere ID     This is your Care EveryWhere ID. This could be used by other organizations to access your [facility] medical records  [id number]       "  Your Vitals Were     Pulse Temperature Height Pulse Oximetry Breastfeeding? BMI (Body Mass Index)    70 98  F (36.7  C) (Oral) 5' 7\" (1.702 m) 98% No 20.52 kg/m2       Blood Pressure from Last 3 Encounters:   04/25/18 108/60   04/24/18 104/64   05/22/17 100/60    Weight from Last 3 Encounters:   04/25/18 131 lb (59.4 kg)   04/24/18 131 lb 14.4 oz (59.8 kg)   05/22/17 141 lb 3.2 oz (64 kg)              We Performed the Following     Kenalog 20 MG  []     Medial/Lateral Epicondylitis injection          Today's Medication Changes          These changes are accurate as of 4/25/18  4:26 PM.  If you have any questions, ask your nurse or doctor.               Start taking these medicines.        Dose/Directions    triamcinolone acetonide 10 MG/ML injection   Commonly known as:  KENALOG   Used for:  Lateral epicondylitis of left elbow   Started by:  Lusi Antonio Mejia MD        Dose:  20 mg   2 mLs (20 mg) by INTRA-ARTICULAR route once for 1 dose   Quantity:  2 mL   Refills:  0            Where to get your medicines      Some of these will need a paper prescription and others can be bought over the counter.  Ask your nurse if you have questions.     You don't need a prescription for these medications     triamcinolone acetonide 10 MG/ML injection                Primary Care Provider Office Phone # Fax #    Lius Antonio Mejia [phone] [address]  [facility] [address]        Equal Access to Services     [facility] AH: Hadii analisa ku hadasho Soomaali, waaxda luqadaha, qaybta kaalmada adeegyada, minerva lo. So [facility] [phone].    ATENCIÓN: Si habla español, tiene a madrid disposición servicios gratuitos de asistencia lingüística. Llame al [phone].    We comply with applicable federal civil rights laws and [place] laws. We do not discriminate on the basis of race, color, national origin, age, disability, sex, sexual orientation, or gender identity.          "   Thank you!     Thank you for choosing [facility]  for your care. Our goal is always to provide you with excellent care. Hearing back from our patients is one way we can continue to improve our services. Please take a few minutes to complete the written survey that you may receive in the mail after your visit with us. Thank you!             Your Updated Medication List - Protect others around you: Learn how to safely use, store and throw away your medicines at www.disposemymeds.org.          This list is accurate as of 4/25/18  4:26 PM.  Always use your most recent med list.                   Brand Name Dispense Instructions for use Diagnosis    clobetasol 0.05 % cream    TEMOVATE    60 g    Apply sparingly to affected area  daily for 3 months    Lichen sclerosus et atrophicus       ibuprofen 800 MG tablet    ADVIL/MOTRIN    90 tablet    Take 1 tablet (800 mg) by mouth every 8 hours as needed for moderate pain    Vaginal laceration, sequela       Multi-vitamin Tabs tablet   Generic drug:  multivitamin, therapeutic with minerals     0    1 TABLET DAILY        triamcinolone acetonide 10 MG/ML injection    KENALOG    2 mL    2 mLs (20 mg) by INTRA-ARTICULAR route once for 1 dose    Lateral epicondylitis of left elbow

## 2018-04-25 NOTE — PROGRESS NOTES
"  SUBJECTIVE:                                                    Evangelina De Anda is a 52 year old female who presents to clinic today for the following health issues:    Patient presents with:  Imm/Inj    Returned for elbow pain that appears to be lateral epicondylitis vs tendonitis vs other and would like to try injection.  Has PT set up in future.    ROS:  MUSCULOSKELETAL: as above    OBJECTIVE:                                                    /60 (BP Location: Right arm, Patient Position: Chair, Cuff Size: Adult Regular)  Pulse 70  Temp 98  F (36.7  C) (Oral)  Ht 5' 7\" (1.702 m)  Wt 131 lb (59.4 kg)  SpO2 98%  Breastfeeding? No  BMI 20.52 kg/m2Body mass index is 20.52 kg/(m^2).  GENERAL APPEARANCE: healthy, alert and no distress  MS: tenderness at lateral epicondyle    PROCEDURE: Injection left lateral epicondyle  Consent discussed including, but not limited to, risk of infection, fat atrophy, and bleeding.  Effected area cleaned with alcohol swab.  1 pecent plain lidocaine 2 mL with Kenalog 40 mg/mL 0.5 mL drawn and injected into above space without difficulty.  Patient tolerated procedure well.  No complications.     ASSESSMENT/PLAN:                                                    1. Lateral epicondylitis of left elbow  Injection as above.  Follow-up with physical therapy.  - Medial/Lateral Epicondylitis injection  - triamcinolone acetonide (KENALOG) 10 MG/ML injection; 2 mLs (20 mg) by INTRA-ARTICULAR route once for 1 dose  Dispense: 2 mL; Refill: 0  - Kenalog 20 MG  []    Luis Antonio Mejia MD  [facility]  "

## 2018-04-26 ENCOUNTER — THERAPY VISIT (OUTPATIENT)
Dept: PHYSICAL THERAPY | Facility: CLINIC | Age: 53
End: 2018-04-26
Payer: COMMERCIAL

## 2018-04-26 DIAGNOSIS — M77.12 LATERAL EPICONDYLITIS OF LEFT ELBOW: Primary | ICD-10-CM

## 2018-04-26 PROCEDURE — 97161 PT EVAL LOW COMPLEX 20 MIN: CPT | Mod: GP

## 2018-04-26 PROCEDURE — 97110 THERAPEUTIC EXERCISES: CPT | Mod: GP

## 2018-04-26 PROCEDURE — 97140 MANUAL THERAPY 1/> REGIONS: CPT | Mod: GP

## 2018-04-26 NOTE — PROGRESS NOTES
[facility] Initial Evaluation  Subjective:  Patient is a 52 year old female presenting with rehab left elbow hpi.   Evangelina De Anda is a 52 year old female with a left elbow condition.  Condition occurred with:  Insidious onset.  Condition occurred: for unknown reasons.  This is a new condition  Onset: 6 wks ago; Had cortisone injection yesterday which took the pain down from 9/10 to 4/10.   Started from doing certain ex's as .  CC: gripping, lifting pan; history of R side but not on left. Pt RHD; .    Patient reports pain:  Lateral epicondyle.  Radiates to: upper forearm;   Pain is described as sharp and is intermittent and reported as 4/10.  Associated symptoms:  Loss of strength.   Symptoms are exacerbated by certain positions, lifting and carrying   Since onset symptoms are gradually improving.                                                      Objective:  System                        ROM:  AROM:  Arom wnl elbow/wrist: Elbow WNL.          Extension Wrist:  Left: 75    Right:  65                Strength:  Strength wnl elbow/wrist: L RTC grossly 5-/5                 :  Dominance: Right   Left: painful (no HHD in clinic)          Special Testing:    Left wrist/elbow positive for the following special tests: Lateral Epicondylitis    Palpation:    Left wrist/elbow tenderness present at: Lateral Epicondyle                                  General     ROS    Assessment/Plan:    Patient is a 52 year old female with left side elbow complaints.    Patient has the following significant findings with corresponding treatment plan.                Diagnosis 1:  Lateral epicondylosis  Pain -  manual therapy, self management, education and home program  Decreased strength - therapeutic exercise and therapeutic activities  Impaired muscle performance - neuro re-education  Decreased function - therapeutic activities    Therapy Evaluation Codes:   1) History comprised of:   Personal  factors that impact the plan of care:      None.    Comorbidity factors that impact the plan of care are:      None.     Medications impacting care: None.  2) Examination of Body Systems comprised of:   Body structures and functions that impact the plan of care:      Elbow.   Activity limitations that impact the plan of care are:      Lifting.  3) Clinical presentation characteristics are:   Stable/Uncomplicated.  4) Decision-Making    Low complexity using standardized patient assessment instrument and/or measureable assessment of functional outcome.  Cumulative Therapy Evaluation is: Low complexity.    Previous and current functional limitations:  (See Goal Flow Sheet for this information)    Short term and Long term goals: (See Goal Flow Sheet for this information)     Communication ability:  Patient appears to be able to clearly communicate and understand verbal and written communication and follow directions correctly.  Treatment Explanation - The following has been discussed with the patient:   RX ordered/plan of care  Anticipated outcomes  Possible risks and side effects  This patient would benefit from PT intervention to resume normal activities.   Rehab potential is good.    Frequency:  1 X week, once daily  Duration:  for 8 weeks  Discharge Plan:  Achieve all LTG.  Independent in home treatment program.  Reach maximal therapeutic benefit.    Please refer to the daily flowsheet for treatment today, total treatment time and time spent performing 1:1 timed codes.

## 2018-04-26 NOTE — MR AVS SNAPSHOT
After Visit Summary   4/26/2018    Evangelina De Anda    MRN: [id number]           Patient Information     Date Of Birth          1965        Visit Information        Provider Department      4/26/2018 12:20 PM Adis Geller PT [facility]        Today's Diagnoses     Lateral epicondylitis of left elbow    -  1       Follow-ups after your visit        Your next 10 appointments already scheduled     May 03, 2018  2:20 PM CDT   SHAE Extremity with Adis Geller PT   [facility] ([facility] [place])    [address]  [place] [address]   [phone]            May 10, 2018  2:20 PM CDT   SHAE Extremity with Adis Geller PT   [facility] ([facility] [place])    [address]  [place] [address] [phone]              Who to contact     If you have questions or need follow up information about today's clinic visit or your schedule please contact [facility] directly at [phone].  Normal or non-critical lab and imaging results will be communicated to you by [employer], letter or phone within 4 business days after the clinic has received the results. If you do not hear from us within 7 days, please contact the clinic through [employer] or phone. If you have a critical or abnormal lab result, we will notify you by phone as soon as possible.  Submit refill requests through [employer] or call your pharmacy and they will forward the refill request to us. Please allow 3 business days for your refill to be completed.          Additional Information About Your Visit        MyChart Information     [employer] gives you secure access to your electronic health record. If you see a primary care provider, you can also send messages to your care team and make appointments. If you have questions, please call your primary care clinic.  If you do not have a primary care provider, please  call [phone] and they will assist you.        Care EveryWhere ID     This is your Care EveryWhere ID. This could be used by other organizations to access your [facility] medical records  [id number]         Blood Pressure from Last 3 Encounters:   04/25/18 108/60   04/24/18 104/64   05/22/17 100/60    Weight from Last 3 Encounters:   04/25/18 59.4 kg (131 lb)   04/24/18 59.8 kg (131 lb 14.4 oz)   05/22/17 64 kg (141 lb 3.2 oz)              We Performed the Following     HC PT EVAL, LOW COMPLEXITY     SHAE INITIAL EVAL REPORT     MANUAL THER TECH,1+REGIONS,EA 15 MIN     THERAPEUTIC EXERCISES        Primary Care Provider Office Phone # Fax #    Luis Antonio Mejia [phone] [address] [facility] [address]        Equal Access to Services     [facility]: Hadii aad ku hadasho Soomaali, waaxda luqadaha, qaybta kaalmada adeegyada, waxay tonyin hayenidn dion bryan . So [facility] [phone].    ATENCIÓN: Si habla español, tiene a madrid disposición servicios gratuitos de asistencia lingüística. Llame al [phone].    We comply with applicable federal civil rights laws and [place] laws. We do not discriminate on the basis of race, color, national origin, age, disability, sex, sexual orientation, or gender identity.            Thank you!     Thank you for choosing [facility]  for your care. Our goal is always to provide you with excellent care. Hearing back from our patients is one way we can continue to improve our services. Please take a few minutes to complete the written survey that you may receive in the mail after your visit with us. Thank you!             Your Updated Medication List - Protect others around you: Learn how to safely use, store and throw away your medicines at www.disposemymeds.org.          This list is accurate as of 4/26/18  1:03 PM.  Always use your most recent med list.                   Brand Name Dispense Instructions for use  Diagnosis    clobetasol 0.05 % cream    TEMOVATE    60 g    Apply sparingly to affected area  daily for 3 months    Lichen sclerosus et atrophicus       ibuprofen 800 MG tablet    ADVIL/MOTRIN    90 tablet    Take 1 tablet (800 mg) by mouth every 8 hours as needed for moderate pain    Vaginal laceration, sequela       Multi-vitamin Tabs tablet   Generic drug:  multivitamin, therapeutic with minerals     0    1 TABLET DAILY

## 2018-05-03 ENCOUNTER — THERAPY VISIT (OUTPATIENT)
Dept: PHYSICAL THERAPY | Facility: CLINIC | Age: 53
End: 2018-05-03
Payer: COMMERCIAL

## 2018-05-03 DIAGNOSIS — M77.12 LATERAL EPICONDYLITIS OF LEFT ELBOW: ICD-10-CM

## 2018-05-03 PROCEDURE — 97035 APP MDLTY 1+ULTRASOUND EA 15: CPT | Mod: GP

## 2018-05-03 PROCEDURE — 97110 THERAPEUTIC EXERCISES: CPT | Mod: GP

## 2018-05-03 PROCEDURE — 97140 MANUAL THERAPY 1/> REGIONS: CPT | Mod: GP

## 2018-05-10 ENCOUNTER — THERAPY VISIT (OUTPATIENT)
Dept: PHYSICAL THERAPY | Facility: CLINIC | Age: 53
End: 2018-05-10
Payer: COMMERCIAL

## 2018-05-10 DIAGNOSIS — M77.12 LATERAL EPICONDYLITIS OF LEFT ELBOW: ICD-10-CM

## 2018-05-10 PROCEDURE — 97035 APP MDLTY 1+ULTRASOUND EA 15: CPT | Mod: GP

## 2018-05-10 PROCEDURE — 97140 MANUAL THERAPY 1/> REGIONS: CPT | Mod: GP

## 2018-05-10 PROCEDURE — 97110 THERAPEUTIC EXERCISES: CPT | Mod: GP

## 2018-06-29 ENCOUNTER — OFFICE VISIT (OUTPATIENT)
Dept: FAMILY MEDICINE | Facility: CLINIC | Age: 53
End: 2018-06-29
Payer: COMMERCIAL

## 2018-06-29 VITALS
SYSTOLIC BLOOD PRESSURE: 95 MMHG | HEART RATE: 64 BPM | DIASTOLIC BLOOD PRESSURE: 65 MMHG | TEMPERATURE: 98 F | RESPIRATION RATE: 16 BRPM | BODY MASS INDEX: 19.73 KG/M2 | WEIGHT: 126 LBS

## 2018-06-29 DIAGNOSIS — L82.1 KERATOSIS, SEBORRHEIC: Primary | ICD-10-CM

## 2018-06-29 PROCEDURE — 17110 DESTRUCTION B9 LES UP TO 14: CPT | Performed by: NURSE PRACTITIONER

## 2018-06-29 NOTE — LETTER
My Depression Action Plan  Name: Evangelina De Anda   Date of Birth 1965  Date: 6/29/2018    My doctor: Luis Antonio Meija   My clinic: [address], [address]  [facility] [address] [phone]          GREEN    ZONE   Good Control    What it looks like:     Things are going generally well. You have normal up s and down s. You may even feel depressed from time to time, but bad moods usually last less than a day.   What you need to do:  1. Continue to care for yourself (see self care plan)  2. Check your depression survival kit and update it as needed  3. Follow your physician s recommendations including any medication.  4. Do not stop taking medication unless you consult with your physician first.           YELLOW         ZONE Getting Worse    What it looks like:     Depression is starting to interfere with your life.     It may be hard to get out of bed; you may be starting to isolate yourself from others.    Symptoms of depression are starting to last most all day and this has happened for several days.     You may have suicidal thoughts but they are not constant.   What you need to do:     1. Call your care team, your response to treatment will improve if you keep your care team informed of your progress. Yellow periods are signs an adjustment may need to be made.     2. Continue your self-care, even if you have to fake it!    3. Talk to someone in your support network    4. Open up your depression survival kit           RED    ZONE Medical Alert - Get Help    What it looks like:     Depression is seriously interfering with your life.     You may experience these or other symptoms: You can t get out of bed most days, can t work or engage in other necessary activities, you have trouble taking care of basic hygiene, or basic responsibilities, thoughts of suicide or death that will not go away, self-injurious behavior.     What you need to do:  1. Call  your care team and request a same-day appointment. If they are not available (weekends or after hours) call your local crisis line, emergency room or 911.            Depression Self Care Plan / Survival Kit    Self-Care for Depression  Here s the deal. Your body and mind are really not as separate as most people think.  What you do and think affects how you feel and how you feel influences what you do and think. This means if you do things that people who feel good do, it will help you feel better.  Sometimes this is all it takes.  There is also a place for medication and therapy depending on how severe your depression is, so be sure to consult with your medical provider and/ or Behavioral Health Consultant if your symptoms are worsening or not improving.     In order to better manage my stress, I will:    Exercise  Get some form of exercise, every day. This will help reduce pain and release endorphins, the  feel good  chemicals in your brain. This is almost as good as taking antidepressants!  This is not the same as joining a gym and then never going! (they count on that by the way ) It can be as simple as just going for a walk or doing some gardening, anything that will get you moving.      Hygiene   Maintain good hygiene (Get out of bed in the morning, Make your bed, Brush your teeth, Take a shower, and Get dressed like you were going to work, even if you are unemployed).  If your clothes don't fit try to get ones that do.    Diet  I will strive to eat foods that are good for me, drink plenty of water, and avoid excessive sugar, caffeine, alcohol, and other mood-altering substances.  Some foods that are helpful in depression are: complex carbohydrates, B vitamins, flaxseed, fish or fish oil, fresh fruits and vegetables.    Psychotherapy  I agree to participate in Individual Therapy (if recommended).    Medication  If prescribed medications, I agree to take them.  Missing doses can result in serious side effects.   I understand that drinking alcohol, or other illicit drug use, may cause potential side effects.  I will not stop my medication abruptly without first discussing it with my provider.    Staying Connected With Others  I will stay in touch with my friends, family members, and my primary care provider/team.    Use your imagination  Be creative.  We all have a creative side; it doesn t matter if it s oil painting, sand castles, or mud pies! This will also kick up the endorphins.    Witness Beauty  (AKA stop and smell the roses) Take a look outside, even in mid-winter. Notice colors, textures. Watch the squirrels and birds.     Service to others  Be of service to others.  There is always someone else in need.  By helping others we can  get out of ourselves  and remember the really important things.  This also provides opportunities for practicing all the other parts of the program.    Humor  Laugh and be silly!  Adjust your TV habits for less news and crime-drama and more comedy.    Control your stress  Try breathing deep, massage therapy, biofeedback, and meditation. Find time to relax each day.     My support system    Clinic Contact:  Phone number:    Contact 1:  Phone number:    Contact 2:  Phone number:    Orthodox/:  Phone number:    Therapist:  Phone number:    [facility] crisis center:    Phone number:    Other community support:  Phone number:

## 2018-06-29 NOTE — PATIENT INSTRUCTIONS
Understanding Seborrheic Keratosis  Seborrheic keratoses are wart-like growths on the skin. These growths are not cancer. Many people get them, especially after age 30.  How to say it  [id number]   What causes seborrheic keratoses?  Doctors do not know what causes seborrheic keratoses. They may run in families. In addition, they become more common as people get older.  What are the symptoms of seborrheic keratoses?  Here is what seborrheic keratoses often look like:    They tend to be round or oval in shape. They can be very small or about as big across as a quarter.    They can appear singly or in clusters.    They tend to be tan, brown, or black in color.    The edges may be scalloped or uneven-looking.    They can have a waxy or scaly look.    They can be a bit raised, appearing to be  stuck on  the skin.    They may become red and irritated if scratched or rubbed by clothing  Sebhorreic keratoses are not painful, but they may be itchy. They can become irritated if they are continually rubbed by skin or clothing. Seborrheic keratoses most often appear on the face, arms, chest, back, or belly.  How are seborrheic keratoses treated?  Seborrheic keratoses don t need to be treated unless they bother you. You may choose to have them removed because you find them unattractive. You may also want them removed because they get irritated and uncomfortable. A healthcare provider can remove them in an office visit. Ways that seborrheic keratoses can be removed include:    Freezing them off with liquid nitrogen    Cutting them off with a scalpel    Burning them off with electricity  What are the complications of seborrheic keratoses?  Seborrheic keratoses are not cancer, but they can look like some types of skin cancer. So it s a good idea to ask your healthcare provider to check any new skin growths. Ask your healthcare provider about any skin problem that concerns you.  When should I call my healthcare  provider?  Call your healthcare provider right away if any of these occur:    You develop a lot of seborrheic keratoses very quickly    You have a sore that does not heal within a few weeks, or heals and then comes back    You have a mole or skin growth that is changing in size, shape, or color    You have a mole or skin growth that looks different on one side from the other    You have a mole or skin growth that is not the same color throughout   Date Last Reviewed: 5/1/2016 2000-2017 [employer]. [address] [place] [address]. All rights reserved. This information is not intended as a substitute for professional medical care. Always follow your healthcare professional's instructions.

## 2018-06-29 NOTE — MR AVS SNAPSHOT
After Visit Summary   6/29/2018    Evangelina De Anda    MRN: [id number]           Patient Information     Date Of Birth          1965        Visit Information        Provider Department      6/29/2018 9:20 AM Ella France APRN [facility]        Today's Diagnoses     Keratosis, seborrheic    -  1      Care Instructions      Understanding Seborrheic Keratosis  Seborrheic keratoses are wart-like growths on the skin. These growths are not cancer. Many people get them, especially after age 30.  How to say it  [id number]   What causes seborrheic keratoses?  Doctors do not know what causes seborrheic keratoses. They may run in families. In addition, they become more common as people get older.  What are the symptoms of seborrheic keratoses?  Here is what seborrheic keratoses often look like:    They tend to be round or oval in shape. They can be very small or about as big across as a quarter.    They can appear singly or in clusters.    They tend to be tan, brown, or black in color.    The edges may be scalloped or uneven-looking.    They can have a waxy or scaly look.    They can be a bit raised, appearing to be  stuck on  the skin.    They may become red and irritated if scratched or rubbed by clothing  Sebhorreic keratoses are not painful, but they may be itchy. They can become irritated if they are continually rubbed by skin or clothing. Seborrheic keratoses most often appear on the face, arms, chest, back, or belly.  How are seborrheic keratoses treated?  Seborrheic keratoses don t need to be treated unless they bother you. You may choose to have them removed because you find them unattractive. You may also want them removed because they get irritated and uncomfortable. A healthcare provider can remove them in an office visit. Ways that seborrheic keratoses can be removed include:    Freezing them off with liquid nitrogen    Cutting them off with a  scalpel    Burning them off with electricity  What are the complications of seborrheic keratoses?  Seborrheic keratoses are not cancer, but they can look like some types of skin cancer. So it s a good idea to ask your healthcare provider to check any new skin growths. Ask your healthcare provider about any skin problem that concerns you.  When should I call my healthcare provider?  Call your healthcare provider right away if any of these occur:    You develop a lot of seborrheic keratoses very quickly    You have a sore that does not heal within a few weeks, or heals and then comes back    You have a mole or skin growth that is changing in size, shape, or color    You have a mole or skin growth that looks different on one side from the other    You have a mole or skin growth that is not the same color throughout   Date Last Reviewed: 5/1/2016 2000-2017 [employer]. [address] [place] [address]. All rights reserved. This information is not intended as a substitute for professional medical care. Always follow your healthcare professional's instructions.                Follow-ups after your visit        Who to contact     If you have questions or need follow up information about today's clinic visit or your schedule please contact [facility] directly at [phone].  Normal or non-critical lab and imaging results will be communicated to you by MyChart, letter or phone within 4 business days after the clinic has received the results. If you do not hear from us within 7 days, please contact the clinic through MyChart or phone. If you have a critical or abnormal lab result, we will notify you by phone as soon as possible.  Submit refill requests through [employer] or call your pharmacy and they will forward the refill request to us. Please allow 3 business days for your refill to be completed.          Additional Information About Your Visit        MyChart Information      [employer] gives you secure access to your electronic health record. If you see a primary care provider, you can also send messages to your care team and make appointments. If you have questions, please call your primary care clinic.  If you do not have a primary care provider, please call [phone] and they will assist you.        Care EveryWhere ID     This is your Care EveryWhere ID. This could be used by other organizations to access your [facility] medical records  [id number]        Your Vitals Were     Pulse Temperature Respirations BMI (Body Mass Index)          64 98  F (36.7  C) (Oral) 16 19.73 kg/m2         Blood Pressure from Last 3 Encounters:   06/29/18 95/65   04/25/18 108/60   04/24/18 104/64    Weight from Last 3 Encounters:   06/29/18 126 lb (57.2 kg)   04/25/18 131 lb (59.4 kg)   04/24/18 131 lb 14.4 oz (59.8 kg)              Today, you had the following     No orders found for display       Primary Care Provider Office Phone # Fax #    Luis Antonio Mejia [phone] [address] [id number]        Equal Access to Services     PRETTY HAN AH: Hadii analisa arthur hadasho Soomaali, waaxda luqadaha, qaybta kaalmada adeegyada, minerva lo. So [facility] [place] [phone].    ATENCIÓN: Si habla español, tiene a madrid disposición servicios gratuitos de asistencia lingüística. Llame al [phone].    We comply with applicable federal civil rights laws and [place] laws. We do not discriminate on the basis of race, color, national origin, age, disability, sex, sexual orientation, or gender identity.            Thank you!     Thank you for choosing [facility]  for your care. Our goal is always to provide you with excellent care. Hearing back from our patients is one way we can continue to improve our services. Please take a few minutes to complete the written survey that you may receive in the mail after your visit with us. Thank you!              Your Updated Medication List - Protect others around you: Learn how to safely use, store and throw away your medicines at www.disposemymeds.org.          This list is accurate as of 6/29/18 10:16 AM.  Always use your most recent med list.                   Brand Name Dispense Instructions for use Diagnosis    clobetasol 0.05 % cream    TEMOVATE    60 g    Apply sparingly to affected area  daily for 3 months    Lichen sclerosus et atrophicus       ibuprofen 800 MG tablet    ADVIL/MOTRIN    90 tablet    Take 1 tablet (800 mg) by mouth every 8 hours as needed for moderate pain    Vaginal laceration, sequela       Multi-vitamin Tabs tablet   Generic drug:  multivitamin, therapeutic with minerals     0    1 TABLET DAILY

## 2018-06-29 NOTE — PROGRESS NOTES
SUBJECTIVE:   Evangelina De Anda is a 52 year old female who presents to clinic today for the following health issues:      Concern - Derm issue/spot on chest  Onset: 6/22/18 - just appeared    Description:   Left side of upper chest area    Intensity: mild    Progression of Symptoms:  worsening and constant    Accompanying Signs & Symptoms:  More Red today, blistery, scab today    Previous history of similar problem:   none    Precipitating factors:   Worsened by: NA    Alleviating factors:  Improved by: nothing    Therapies Tried and outcome: neosporin on it, lotion  Noticed a spot on her L chest 1 week ago.  Area is tender.       Problem list and histories reviewed & adjusted, as indicated.  Additional history: as documented    Current Outpatient Prescriptions   Medication Sig Dispense Refill     clobetasol (TEMOVATE) 0.05 % cream Apply sparingly to affected area  daily for 3 months 60 g 0     ibuprofen (ADVIL,MOTRIN) 800 MG tablet Take 1 tablet (800 mg) by mouth every 8 hours as needed for moderate pain 90 tablet 1     MULTI-VITAMIN OR TABS 1 TABLET DAILY 0 0     Allergies   Allergen Reactions     Pollen Extract        Reviewed and updated as needed this visit by clinical staff  Tobacco  Allergies  Meds  Problems  Med Hx  Surg Hx  Fam Hx  Soc Hx        Reviewed and updated as needed this visit by Provider         ROS:  INTEGUMENTARY/SKIN: POSITIVE for skin lesion     OBJECTIVE:     BP 95/65  Pulse 64  Temp 98  F (36.7  C) (Oral)  Resp 16  Wt 126 lb (57.2 kg)  BMI 19.73 kg/m2  Body mass index is 19.73 kg/(m^2).  GENERAL: healthy, alert and no distress  SKIN:  Slightly irritated small seborrheic keratosis on the L anterior chest    Diagnostic Test Results:  none     ASSESSMENT/PLAN:   1. Keratosis, seborrheic  Discussed treatment options.  She agrees to treat with LN2 today.  SK treated with 3 freeze/thaw cycles.  She tolerated procedure well.    - DESTRUCT BENIGN LESION, UP TO 14        Ella L.  HARI France [facility]

## 2018-06-30 ASSESSMENT — PATIENT HEALTH QUESTIONNAIRE - PHQ9: SUM OF ALL RESPONSES TO PHQ QUESTIONS 1-9: 0

## 2018-09-05 ENCOUNTER — OFFICE VISIT (OUTPATIENT)
Dept: FAMILY MEDICINE | Facility: CLINIC | Age: 53
End: 2018-09-05
Payer: COMMERCIAL

## 2018-09-05 VITALS
SYSTOLIC BLOOD PRESSURE: 85 MMHG | DIASTOLIC BLOOD PRESSURE: 57 MMHG | WEIGHT: 130 LBS | BODY MASS INDEX: 20.36 KG/M2 | HEART RATE: 63 BPM | TEMPERATURE: 97.8 F | OXYGEN SATURATION: 98 %

## 2018-09-05 DIAGNOSIS — R30.0 DYSURIA: Primary | ICD-10-CM

## 2018-09-05 LAB
ALBUMIN UR-MCNC: NEGATIVE MG/DL
APPEARANCE UR: CLEAR
BILIRUB UR QL STRIP: NEGATIVE
COLOR UR AUTO: YELLOW
GLUCOSE UR STRIP-MCNC: NEGATIVE MG/DL
HGB UR QL STRIP: ABNORMAL
KETONES UR STRIP-MCNC: NEGATIVE MG/DL
LEUKOCYTE ESTERASE UR QL STRIP: NEGATIVE
MUCOUS THREADS #/AREA URNS LPF: PRESENT /LPF
NITRATE UR QL: NEGATIVE
NON-SQ EPI CELLS #/AREA URNS LPF: ABNORMAL /LPF
PH UR STRIP: 7 PH (ref 5–7)
RBC #/AREA URNS AUTO: ABNORMAL /HPF
SOURCE: ABNORMAL
SP GR UR STRIP: 1.02 (ref 1–1.03)
SPECIMEN SOURCE: NORMAL
UROBILINOGEN UR STRIP-ACNC: 0.2 EU/DL (ref 0.2–1)
WBC #/AREA URNS AUTO: ABNORMAL /HPF
WET PREP SPEC: NORMAL

## 2018-09-05 PROCEDURE — 81001 URINALYSIS AUTO W/SCOPE: CPT | Performed by: PHYSICIAN ASSISTANT

## 2018-09-05 PROCEDURE — 99213 OFFICE O/P EST LOW 20 MIN: CPT | Performed by: PHYSICIAN ASSISTANT

## 2018-09-05 PROCEDURE — 87210 SMEAR WET MOUNT SALINE/INK: CPT | Performed by: PHYSICIAN ASSISTANT

## 2018-09-05 RX ORDER — FLUCONAZOLE 150 MG/1
150 TABLET ORAL ONCE
Qty: 1 TABLET | Refills: 0 | Status: SHIPPED | OUTPATIENT
Start: 2018-09-05 | End: 2018-09-05

## 2018-09-05 NOTE — PROGRESS NOTES
SUBJECTIVE:   Evangelina De Anda is a 52 year old female who presents to clinic today for the following health issues:      URINARY TRACT SYMPTOMS  Onset: 18    Description:   Painful urination (Dysuria): YES- pain just before urination  Blood in urine (Hematuria): no   Delay in urine (Hesitency): no  Denies increased urinary frequency    Feels like chafing or outer irritation but she hasn't noticed anything.  Tries to change out of wet spandex as soon as she can. She is a .     Intensity: 7/10    Progression of Symptoms:  same    Accompanying Signs & Symptoms:  Fever/chills: no   Flank pain no   Nausea and vomiting: no   Any vaginal symptoms: none- denies itching but has burning  Abdominal/Pelvic Pain: YES    History:   History of frequent UTI's: no   History of kidney stones: no   Sexually Active: no   Possibility of pregnancy: No    Precipitating factors:   none    Therapies Tried and outcome: took left over antibiotics which seemed to help      Problem list and histories reviewed & adjusted, as indicated.  Additional history: as documented    Patient Active Problem List   Diagnosis     Tobacco use disorder     Adjustment disorder with mixed anxiety and depressed mood     Family history of colon cancer     Family history of coronary artery disease     Tubular adenoma     Anxiety     Unexplained endometrial cells on cervical Pap smear     MAYRA II (vulvar intraepithelial neoplasia II)     Lateral epicondylitis of left elbow     Past Surgical History:   Procedure Laterality Date     APPENDECTOMY      age 6 yrs.     COLONOSCOPY  2015    Dr. Estella LAROSE     EXCISE VULVA WIDE LOCAL  7/15/2014    Procedure: EXCISE VULVA WIDE LOCAL;  Surgeon: Melinda Whitten DO;  Location: [facility] OR     GYN SURGERY           SURGICAL HISTORY OF -       C section     TONSILLECTOMY      T&A as a child       Social History   Substance Use Topics     Smoking status: Current Every Day Smoker      Types: Cigarettes     Smokeless tobacco: Never Used      Comment: 1 cig/day      Alcohol use No      Comment: stopped 2014     Family History   Problem Relation Age of Onset     C.A.D. Father 65     heart attack and quadruble bypass     Cancer Father      spleen     HEART DISEASE Father      Hypertension Paternal Grandmother      Cerebrovascular Disease Maternal Grandmother      Cancer - colorectal Mother      in her 50's.     Neurologic Disorder Mother      MS- at age of 65     HEART DISEASE Sister 38      from massive heart attack at age of 38     Family History Negative Brother      Family History Negative Sister          Current Outpatient Prescriptions   Medication Sig Dispense Refill     fluconazole (DIFLUCAN) 150 MG tablet Take 1 tablet (150 mg) by mouth once for 1 dose 1 tablet 0     ibuprofen (ADVIL,MOTRIN) 800 MG tablet Take 1 tablet (800 mg) by mouth every 8 hours as needed for moderate pain 90 tablet 1     MULTI-VITAMIN OR TABS 1 TABLET DAILY 0 0     Allergies   Allergen Reactions     Pollen Extract        Reviewed and updated as needed this visit by clinical staff       Reviewed and updated as needed this visit by Provider         ROS:  Constitutional, HEENT, cardiovascular, pulmonary, gi and gu systems are negative, except as otherwise noted.    OBJECTIVE:     BP (!) 85/57 (BP Location: Right arm, Patient Position: Chair, Cuff Size: Adult Regular)  Pulse 63  Temp 97.8  F (36.6  C) (Oral)  Wt 130 lb (59 kg)  LMP 2018 (Exact Date)  SpO2 98%  Breastfeeding? No  BMI 20.36 kg/m2  Body mass index is 20.36 kg/(m^2).  GENERAL: healthy, alert and no distress  EYES: Eyes grossly normal to inspection, PERRL and conjunctivae and sclerae normal  RESP: lungs clear to auscultation - no rales, rhonchi or wheezes  CV: regular rate and rhythm, normal S1 S2, no S3 or S4, no murmur, click or rub, no peripheral edema and peripheral pulses strong  ABDOMEN: soft, nontender, no  hepatosplenomegaly, no masses and bowel sounds normal  MS: no gross musculoskeletal defects noted, no edema  SKIN: no suspicious lesions or rashes  NEURO: Normal strength and tone, mentation intact and speech normal  BACK: no CVA tenderness  PSYCH: mentation appears normal, affect normal/bright    Diagnostic Test Results:  Results for orders placed or performed in visit on 09/05/18 (from the past 24 hour(s))   UA reflex to Microscopic and Culture   Result Value Ref Range    Color Urine Yellow     Appearance Urine Clear     Glucose Urine Negative NEG^Negative mg/dL    Bilirubin Urine Negative NEG^Negative    Ketones Urine Negative NEG^Negative mg/dL    Specific Gravity Urine 1.020 1.003 - 1.035    Blood Urine Trace (A) NEG^Negative    pH Urine 7.0 5.0 - 7.0 pH    Protein Albumin Urine Negative NEG^Negative mg/dL    Urobilinogen Urine 0.2 0.2 - 1.0 EU/dL    Nitrite Urine Negative NEG^Negative    Leukocyte Esterase Urine Negative NEG^Negative    Source Midstream Urine    Urine Microscopic   Result Value Ref Range    WBC Urine 0 - 5 OTO5^0 - 5 /HPF    RBC Urine 2-5 (A) OTO2^O - 2 /HPF    Squamous Epithelial /LPF Urine Few FEW^Few /LPF    Mucous Urine Present (A) NEG^Negative /LPF   Wet prep   Result Value Ref Range    Specimen Description Vagina     Wet Prep No Trichomonas seen     Wet Prep No clue cells seen     Wet Prep No yeast seen        ASSESSMENT/PLAN:       (R30.0) Dysuria  (primary encounter diagnosis)    Comment: Unclear cause. Will treat for yeast infection since she is at risk wearing wet spandex. Follow-up with primary care provider if not improving in 1 week.    Plan: UA reflex to Microscopic and Culture, Urine         Microscopic, Wet prep, fluconazole (DIFLUCAN)         150 MG tablet              Patient Instructions   Take the one time dose of diflucan. You can use vagisil or monistat externally to help with irritation.     Push fluids.    If not improving in 1 week, follow-up with primary care  provider.          Jayjay Mcallister PA-C  [facility]

## 2018-09-05 NOTE — PATIENT INSTRUCTIONS
Take the one time dose of diflucan. You can use vagisil or monistat externally to help with irritation.     Push fluids.    If not improving in 1 week, follow-up with primary care provider.

## 2018-09-05 NOTE — MR AVS SNAPSHOT
After Visit Summary   9/5/2018    Evangelina De Anda    MRN: [id number]           Patient Information     Date Of Birth          1965        Visit Information        Provider Department      9/5/2018 9:20 AM Jayjay Mcallister PA-C [facility]        Today's Diagnoses     Dysuria    -  1      Care Instructions    Take the one time dose of diflucan. You can use vagisil or monistat externally to help with irritation.     Push fluids.    If not improving in 1 week, follow-up with primary care provider.              Follow-ups after your visit        Who to contact     If you have questions or need follow up information about today's clinic visit or your schedule please contact [facility] directly at [phone].  Normal or non-critical lab and imaging results will be communicated to you by [employer], letter or phone within 4 business days after the clinic has received the results. If you do not hear from us within 7 days, please contact the clinic through [employer] or phone. If you have a critical or abnormal lab result, we will notify you by phone as soon as possible.  Submit refill requests through [employer] or call your pharmacy and they will forward the refill request to us. Please allow 3 business days for your refill to be completed.          Additional Information About Your Visit        MyChart Information     [employer] gives you secure access to your electronic health record. If you see a primary care provider, you can also send messages to your care team and make appointments. If you have questions, please call your primary care clinic.  If you do not have a primary care provider, please call [phone] and they will assist you.        Care EveryWhere ID     This is your Care EveryWhere ID. This could be used by other organizations to access your [facility] medical records  [id number]        Your Vitals Were     Pulse Temperature Last Period Pulse Oximetry  Breastfeeding? BMI (Body Mass Index)    63 97.8  F (36.6  C) (Oral) 08/22/2018 (Exact Date) 98% No 20.36 kg/m2       Blood Pressure from Last 3 Encounters:   09/05/18 (!) 85/57   06/29/18 95/65   04/25/18 108/60    Weight from Last 3 Encounters:   09/05/18 130 lb (59 kg)   06/29/18 126 lb (57.2 kg)   04/25/18 131 lb (59.4 kg)              We Performed the Following     UA reflex to Microscopic and Culture     Urine Microscopic     Wet prep        Primary Care Provider Office Phone # Fax #    Luis Antonio Mejia [phone] [address]  [facility] [address]        Equal Access to Services     PRETTY HAN : Laurent joneso Sowes, waaxda luqadaha, qaybta kaalmada adeegyada, minerva bryan . So [facility] [phone].    ATENCIÓN: Si habla español, tiene a madrid disposición servicios gratuitos de asistencia lingüística. Llame al [phone].    We comply with applicable federal civil rights laws and [place] laws. We do not discriminate on the basis of race, color, national origin, age, disability, sex, sexual orientation, or gender identity.            Thank you!     Thank you for choosing [facility]  for your care. Our goal is always to provide you with excellent care. Hearing back from our patients is one way we can continue to improve our services. Please take a few minutes to complete the written survey that you may receive in the mail after your visit with us. Thank you!             Your Updated Medication List - Protect others around you: Learn how to safely use, store and throw away your medicines at www.disposemymeds.org.          This list is accurate as of 9/5/18 10:18 AM.  Always use your most recent med list.                   Brand Name Dispense Instructions for use Diagnosis    ibuprofen 800 MG tablet    ADVIL/MOTRIN    90 tablet    Take 1 tablet (800 mg) by mouth every 8 hours as needed for moderate pain    Vaginal laceration,  sequela       Multi-vitamin Tabs tablet   Generic drug:  multivitamin, therapeutic with minerals     0    1 TABLET DAILY

## 2019-04-22 ENCOUNTER — OFFICE VISIT (OUTPATIENT)
Dept: OBGYN | Facility: CLINIC | Age: 54
End: 2019-04-22
Payer: COMMERCIAL

## 2019-04-22 VITALS
SYSTOLIC BLOOD PRESSURE: 100 MMHG | BODY MASS INDEX: 21 KG/M2 | DIASTOLIC BLOOD PRESSURE: 68 MMHG | HEIGHT: 67 IN | WEIGHT: 133.8 LBS

## 2019-04-22 DIAGNOSIS — Z00.00 ENCOUNTER FOR PREVENTATIVE ADULT HEALTH CARE EXAMINATION: Primary | ICD-10-CM

## 2019-04-22 DIAGNOSIS — L90.0 LICHEN SCLEROSUS: ICD-10-CM

## 2019-04-22 DIAGNOSIS — N95.1 SYMPTOMATIC MENOPAUSAL OR FEMALE CLIMACTERIC STATES: ICD-10-CM

## 2019-04-22 DIAGNOSIS — Z11.3 SCREEN FOR STD (SEXUALLY TRANSMITTED DISEASE): ICD-10-CM

## 2019-04-22 LAB
SPECIMEN SOURCE: NORMAL
WET PREP SPEC: NORMAL

## 2019-04-22 PROCEDURE — G0145 SCR C/V CYTO,THINLAYER,RESCR: HCPCS | Performed by: FAMILY MEDICINE

## 2019-04-22 PROCEDURE — 83001 ASSAY OF GONADOTROPIN (FSH): CPT | Performed by: FAMILY MEDICINE

## 2019-04-22 PROCEDURE — 87210 SMEAR WET MOUNT SALINE/INK: CPT | Performed by: FAMILY MEDICINE

## 2019-04-22 PROCEDURE — 87624 HPV HI-RISK TYP POOLED RSLT: CPT | Performed by: FAMILY MEDICINE

## 2019-04-22 PROCEDURE — 83002 ASSAY OF GONADOTROPIN (LH): CPT | Performed by: FAMILY MEDICINE

## 2019-04-22 PROCEDURE — 87591 N.GONORRHOEAE DNA AMP PROB: CPT | Performed by: FAMILY MEDICINE

## 2019-04-22 PROCEDURE — 99396 PREV VISIT EST AGE 40-64: CPT | Performed by: FAMILY MEDICINE

## 2019-04-22 PROCEDURE — 84144 ASSAY OF PROGESTERONE: CPT | Performed by: FAMILY MEDICINE

## 2019-04-22 PROCEDURE — 82670 ASSAY OF TOTAL ESTRADIOL: CPT | Performed by: FAMILY MEDICINE

## 2019-04-22 PROCEDURE — G0476 HPV COMBO ASSAY CA SCREEN: HCPCS | Performed by: FAMILY MEDICINE

## 2019-04-22 PROCEDURE — 87491 CHLMYD TRACH DNA AMP PROBE: CPT | Performed by: FAMILY MEDICINE

## 2019-04-22 PROCEDURE — 36415 COLL VENOUS BLD VENIPUNCTURE: CPT | Performed by: FAMILY MEDICINE

## 2019-04-22 RX ORDER — CLOBETASOL PROPIONATE 0.5 MG/G
CREAM TOPICAL
Qty: 70 G | Refills: 3 | Status: SHIPPED | OUTPATIENT
Start: 2019-04-22 | End: 2019-12-05

## 2019-04-22 ASSESSMENT — MIFFLIN-ST. JEOR: SCORE: 1244.54

## 2019-04-22 NOTE — LETTER
May 6, 2019    Evangelinaziggy De Anda  [address] [facility] [address]    Dear MsTiarraAdilson,  This letter is regarding your recent Pap smear (cervical cancer screening) and Human Papillomavirus (HPV) test.  We are happy to inform you that your Pap smear result is normal. Cervical cancer is closely linked with certain types of HPV. Your results showed no evidence of high-risk HPV.  Therefore we recommend you return in 3 years for your next pap smear and HPV test.  You will still need to return to the clinic every year for an annual exam and other preventive tests.  If you have additional questions regarding this result, please call our registered nurse, Tanesha at [phone].  Sincerely,    Melinda Whitten DO/kamila

## 2019-04-22 NOTE — NURSING NOTE
"Chief Complaint   Patient presents with     Gyn Exam     has mammo scheduled 19--went through hot flashes--hasn't had period for 2 months--wondering where she is in menpause       Initial /68 (BP Location: Right arm, Patient Position: Sitting, Cuff Size: Adult Regular)   Ht 1.702 m (5' 7\")   Wt 60.7 kg (133 lb 12.8 oz)   BMI 20.96 kg/m   Estimated body mass index is 20.96 kg/m  as calculated from the following:    Height as of this encounter: 1.702 m (5' 7\").    Weight as of this encounter: 60.7 kg (133 lb 12.8 oz).  BP completed using cuff size: regular    Questioned patient about current smoking habits.  Pt. currently smokes.  Advised about smoking cessation.          The following HM Due: NONE    "

## 2019-04-22 NOTE — PATIENT INSTRUCTIONS
Call Lab [phone] [place] or [phone] [place] to schedule future labs. You may schedule   The labs at any Supply facitily.  If you are getting cholesterol checked please fast 8 hours     Return yearly     Call if you need vaginal estrogen     Dr. Melinda Whitten, DO    Obstetrics and Gynecology  [facility] - [place] and [place]

## 2019-04-22 NOTE — PROGRESS NOTES
SUBJECTIVE:  Evangelina De Anda is an 53 year old  woman who presents for   annual gyn exam. No LMP recorded. Periods are irregular, lasting varying # of days. Dysmenorrhea:none. Cyclic symptoms include none. No intermenstrual bleeding,   spotting, or discharge. STD hx: HPV.    Current contraception: none  SEGUN exposure: no  History of abnormal Pap smear: Yes   12: Endometrial cells on pap, Irregular cycles, needs endo bx with gyn.   12/3/12: endo bx WNL. Vulvar bx MAYRA 2. Plan removal.   03/10/17: NIL pap, Endometrial cells seen on pap. LMP was 03/10/17 and are  irregular, just went 3 months without one now has had 2 periods within the  last 2 months. No intermenstrual bleeding. Neg HR HPV result. Plan  Endometrial Bx per provider.   04/10/17: Endometrial Bx was normal.  Family history of uterine or ovarian cancer: No  Regular self breast exam: Yes  History of abnormal mammogram: No  Family history of breast cancer: No  History of abnormal lipids: No        - Pt states she hasn't had her menses for 2 months now & very irregular before then, did experience a period of hot flushes during that time & is now wondering if she is entering menopause.         Past Medical History:   Diagnosis Date     Alcohol abuse, in remission      Endometrial cells on cervical Pap smear inconsistent w/LMP 2012    endo bx WNL     MAYRA II (vulvar intraepithelial neoplasia II) 2012        Family History   Problem Relation Age of Onset     C.A.D. Father 65        heart attack and quadruble bypass     Cancer Father         spleen     Heart Disease Father      Hypertension Paternal Grandmother      Cerebrovascular Disease Maternal Grandmother      Cancer - colorectal Mother         in her 50's.     Neurologic Disorder Mother         MS- at age of 65     Heart Disease Sister 38         from massive heart attack at age of 38     Family History Negative Brother      Family History Negative Sister        Past Surgical  "History:   Procedure Laterality Date     APPENDECTOMY      age 6 yrs.     COLONOSCOPY  2015    Dr. Estella LAROSE     EXCISE VULVA WIDE LOCAL  7/15/2014    Procedure: EXCISE VULVA WIDE LOCAL;  Surgeon: Melinda Whitten DO;  Location: [facility] OR     GYN SURGERY           SURGICAL HISTORY OF -       C section     TONSILLECTOMY      T&A as a child       Current Outpatient Medications   Medication     clobetasol (TEMOVATE) 0.05 % external cream     ibuprofen (ADVIL,MOTRIN) 800 MG tablet     No current facility-administered medications for this visit.      Allergies   Allergen Reactions     Pollen Extract        Social History     Tobacco Use     Smoking status: Current Every Day Smoker     Types: Cigarettes     Smokeless tobacco: Never Used     Tobacco comment: 1 cig/day    Substance Use Topics     Alcohol use: No     Alcohol/week: 0.0 oz     Comment: stopped 2014       Review Of Systems  Ears/Nose/Throat: negative  Respiratory: No shortness of breath, dyspnea on exertion, cough, or hemoptysis  Cardiovascular: negative  Gastrointestinal: negative  Genitourinary: negative  Constitutional, HEENT, cardiovascular, pulmonary, GI, , musculoskeletal, neuro, skin, endocrine and psych systems are negative, except as otherwise noted.        This document serves as a record of the services and decisions personally performed and made by Melinda Whitten DO. It was created on her behalf by Yennifer Garay, a trained medical scribe. The creation of this document is based the provider's statements to the medical scribe.  Scribziggy Garay 2:30 PM, 2019    OBJECTIVE:  /68 (BP Location: Right arm, Patient Position: Sitting, Cuff Size: Adult Regular)   Ht 1.702 m (5' 7\")   Wt 60.7 kg (133 lb 12.8 oz)   BMI 20.96 kg/m    General appearance: healthy, alert and no distress  Skin: Skin color, texture, turgor normal. No rashes or lesions.  Ears: negative  Nose/Sinuses: Nares normal. Septum midline. " Mucosa normal. No drainage or sinus tenderness.  Oropharynx: Lips, mucosa, and tongue normal. Teeth and gums normal.  Neck: Neck supple. No adenopathy. Thyroid symmetric, normal size,, Carotids without bruits.  Lungs: negative, Percussion normal. Good diaphragmatic excursion. Lungs clear  Heart: negative, PMI normal. No lifts, heaves, or thrills. RRR. No murmurs, clicks gallops or rub  Breasts: Inspection negative. No nipple discharge or bleeding. No masses.  Abdomen: Abdomen soft, non-tender. BS normal. No masses, organomegaly  Pelvic: Pelvic examination with pap/ Gonorrhea and Chlamydia   including  External genitalia normal   and vagina normal rugatted not atrophic  Examination of urethra  normal no masses, tenderness, scarring  bladder, no masses or tenderness  Cervix no lesions or discharge  Bimanual exam with   Uterus 7 weeks size, mid position, mobile, no tenderness, no descent   Adnexa/parametria normal  Mild whitening changes at introitus              ASSESSMENT:  Evangelina De Anda is an 53 year old  woman who presents for   annual gyn exam.     PLAN:  Dx: Annual gyn physical; Symptomatic menopausal state; Lichen sclerosus  1)  Will return for fasting labs; Pap smear - pathology pending; GC & Wet prep - pending; Mammogram scheduled 2019  2)  Symptomatic menopausal state: Labs pending -- Informed menopause is not confirmed until 1 year without menses   - Should still protect against pregnancy [if desired], because even though it is  rare she could still be fertile at this time >> Discussed available options    > Will use condoms at this time, may report if another form desired   - Recommended to use lubrication with intercourse, if that doesn't work to treat  vaginal dryness then may start vaginal estrogen  3)  Lichen sclerosus: Continue applying clobetasol cream three times weekly  4)  Return to clinic in 1 year for annual gyn physical; otherwise as needed.       Rx:  - clobetasol 0.05%  external cream, Apply topically three times a week        PE:  Reviewed health maintenance including diet, regular exercise   and periodic exams.        The information in this document, created by the medical scribe for me, accurately reflects the services I personally performed and the decisions made by me. I have reviewed and approved this document for accuracy prior to leaving the patient care area.  2:30 PM, 04/22/19    Dr. Melinda Whitten, DO    Obstetrics and Gynecology  [facility]

## 2019-04-23 LAB
ESTRADIOL SERPL-MCNC: 152 PG/ML
FSH SERPL-ACNC: 16.5 IU/L
LH SERPL-ACNC: 14.8 IU/L
PROGEST SERPL-MCNC: 3.9 NG/ML

## 2019-04-24 LAB
C TRACH DNA SPEC QL NAA+PROBE: NEGATIVE
N GONORRHOEA DNA SPEC QL NAA+PROBE: NEGATIVE
SPECIMEN SOURCE: NORMAL
SPECIMEN SOURCE: NORMAL

## 2019-04-25 LAB
COPATH REPORT: NORMAL
PAP: NORMAL

## 2019-04-26 LAB
FINAL DIAGNOSIS: NORMAL
HPV HR 12 DNA CVX QL NAA+PROBE: NEGATIVE
HPV16 DNA SPEC QL NAA+PROBE: NEGATIVE
HPV18 DNA SPEC QL NAA+PROBE: NEGATIVE
SPECIMEN DESCRIPTION: NORMAL
SPECIMEN SOURCE CVX/VAG CYTO: NORMAL

## 2019-04-29 ENCOUNTER — TELEPHONE (OUTPATIENT)
Dept: OBGYN | Facility: CLINIC | Age: 54
End: 2019-04-29

## 2019-04-29 DIAGNOSIS — Z30.011 ENCOUNTER FOR INITIAL PRESCRIPTION OF CONTRACEPTIVE PILLS: Primary | ICD-10-CM

## 2019-04-29 NOTE — TELEPHONE ENCOUNTER
Evangelina seen in clinic with Dr. Whitten on 4/22/19.  Evangelina states she was instructed to call when she decided on a method of birth control and Dr. Whitten would order a script.  Evangelina has decided on the birth control pill and would like ordered to the [facility] pharmacy on file.  Requests a call when medication is ordered please.  Okay to leave detailed msg.        Thanks  Melinda Eaton RN  [facility]

## 2019-05-03 RX ORDER — NORGESTIMATE AND ETHINYL ESTRADIOL 7DAYSX3 28
1 KIT ORAL DAILY
Qty: 90 TABLET | Refills: 3 | Status: SHIPPED | OUTPATIENT
Start: 2019-05-03 | End: 2019-05-03

## 2019-05-03 RX ORDER — ACETAMINOPHEN AND CODEINE PHOSPHATE 120; 12 MG/5ML; MG/5ML
0.35 SOLUTION ORAL DAILY
Qty: 90 TABLET | Refills: 3 | Status: SHIPPED | OUTPATIENT
Start: 2019-05-03 | End: 2019-07-22

## 2019-05-03 NOTE — TELEPHONE ENCOUNTER
Patient called on 4/29 for the request but was routed to the wrong pool, can someone please take a look at this request and give patient a call.  Kim Sanchez

## 2019-05-06 ENCOUNTER — ANCILLARY PROCEDURE (OUTPATIENT)
Dept: MAMMOGRAPHY | Facility: CLINIC | Age: 54
End: 2019-05-06
Payer: COMMERCIAL

## 2019-05-06 DIAGNOSIS — Z12.31 VISIT FOR SCREENING MAMMOGRAM: ICD-10-CM

## 2019-05-06 PROCEDURE — 77067 SCR MAMMO BI INCL CAD: CPT | Mod: TC

## 2019-05-13 ENCOUNTER — TELEPHONE (OUTPATIENT)
Dept: OBGYN | Facility: CLINIC | Age: 54
End: 2019-05-13

## 2019-05-13 NOTE — TELEPHONE ENCOUNTER
Pt calling with with questions about the birth control she started.  She just restarted the birth control and is having some spotting.  Advised that this is normal when starting birth control and this can take up to 3 months to start regulating itself out.  Advised to make sure to take the pill at the same time everyday.     Pt expressed understanding and acceptance of the plan.  Pt had no further questions at this time.  Advised can call back to clinic at any time with concerns.       Che Welsh RN, BSN

## 2019-05-15 ENCOUNTER — HOSPITAL ENCOUNTER (EMERGENCY)
Facility: CLINIC | Age: 54
Discharge: HOME | End: 2019-05-16
Admitting: PHYSICIAN ASSISTANT
Payer: COMMERCIAL

## 2019-05-15 DIAGNOSIS — R10.84 ABDOMINAL PAIN, GENERALIZED: ICD-10-CM

## 2019-05-15 LAB
ALBUMIN SERPL-MCNC: 3.9 G/DL (ref 3.4–5)
ALBUMIN UR-MCNC: NEGATIVE MG/DL
ALP SERPL-CCNC: 65 U/L (ref 40–150)
ALT SERPL W P-5'-P-CCNC: 27 U/L (ref 0–50)
ANION GAP SERPL CALCULATED.3IONS-SCNC: 6 MMOL/L (ref 3–14)
APPEARANCE UR: CLEAR
AST SERPL W P-5'-P-CCNC: 19 U/L (ref 0–45)
BASOPHILS # BLD AUTO: 0 10E9/L (ref 0–0.2)
BASOPHILS NFR BLD AUTO: 0.3 %
BILIRUB SERPL-MCNC: 0.6 MG/DL (ref 0.2–1.3)
BILIRUB UR QL STRIP: NEGATIVE
BUN SERPL-MCNC: 24 MG/DL (ref 7–30)
CALCIUM SERPL-MCNC: 8.6 MG/DL (ref 8.5–10.1)
CHLORIDE SERPL-SCNC: 110 MMOL/L (ref 94–109)
CO2 SERPL-SCNC: 26 MMOL/L (ref 20–32)
COLOR UR AUTO: ABNORMAL
CREAT SERPL-MCNC: 1.03 MG/DL (ref 0.52–1.04)
DIFFERENTIAL METHOD BLD: NORMAL
EOSINOPHIL # BLD AUTO: 0.2 10E9/L (ref 0–0.7)
EOSINOPHIL NFR BLD AUTO: 3.1 %
ERYTHROCYTE [DISTWIDTH] IN BLOOD BY AUTOMATED COUNT: 13.2 % (ref 10–15)
GFR SERPL CREATININE-BSD FRML MDRD: 62 ML/MIN/{1.73_M2}
GLUCOSE SERPL-MCNC: 98 MG/DL (ref 70–99)
GLUCOSE UR STRIP-MCNC: NEGATIVE MG/DL
HCT VFR BLD AUTO: 39.8 % (ref 35–47)
HGB BLD-MCNC: 13.3 G/DL (ref 11.7–15.7)
HGB UR QL STRIP: NEGATIVE
IMM GRANULOCYTES # BLD: 0 10E9/L (ref 0–0.4)
IMM GRANULOCYTES NFR BLD: 0.2 %
KETONES UR STRIP-MCNC: NEGATIVE MG/DL
LEUKOCYTE ESTERASE UR QL STRIP: NEGATIVE
LIPASE SERPL-CCNC: 299 U/L (ref 73–393)
LYMPHOCYTES # BLD AUTO: 2.4 10E9/L (ref 0.8–5.3)
LYMPHOCYTES NFR BLD AUTO: 36.6 %
MCH RBC QN AUTO: 31.1 PG (ref 26.5–33)
MCHC RBC AUTO-ENTMCNC: 33.4 G/DL (ref 31.5–36.5)
MCV RBC AUTO: 93 FL (ref 78–100)
MONOCYTES # BLD AUTO: 0.5 10E9/L (ref 0–1.3)
MONOCYTES NFR BLD AUTO: 8.1 %
MUCOUS THREADS #/AREA URNS LPF: PRESENT /LPF
NEUTROPHILS # BLD AUTO: 3.3 10E9/L (ref 1.6–8.3)
NEUTROPHILS NFR BLD AUTO: 51.7 %
NITRATE UR QL: NEGATIVE
NRBC # BLD AUTO: 0 10*3/UL
NRBC BLD AUTO-RTO: 0 /100
PH UR STRIP: 7 PH (ref 5–7)
PLATELET # BLD AUTO: 220 10E9/L (ref 150–450)
POTASSIUM SERPL-SCNC: 4 MMOL/L (ref 3.4–5.3)
PROT SERPL-MCNC: 7 G/DL (ref 6.8–8.8)
RBC # BLD AUTO: 4.28 10E12/L (ref 3.8–5.2)
RBC #/AREA URNS AUTO: 1 /HPF (ref 0–2)
SODIUM SERPL-SCNC: 142 MMOL/L (ref 133–144)
SOURCE: ABNORMAL
SP GR UR STRIP: 1.01 (ref 1–1.03)
SQUAMOUS #/AREA URNS AUTO: <1 /HPF (ref 0–1)
UROBILINOGEN UR STRIP-MCNC: NORMAL MG/DL (ref 0–2)
WBC # BLD AUTO: 6.5 10E9/L (ref 4–11)
WBC #/AREA URNS AUTO: <1 /HPF (ref 0–5)

## 2019-05-15 PROCEDURE — 99285 EMERGENCY DEPT VISIT HI MDM: CPT | Mod: 25

## 2019-05-15 PROCEDURE — 81001 URINALYSIS AUTO W/SCOPE: CPT | Performed by: EMERGENCY MEDICINE

## 2019-05-15 PROCEDURE — 80053 COMPREHEN METABOLIC PANEL: CPT | Performed by: EMERGENCY MEDICINE

## 2019-05-15 PROCEDURE — 85025 COMPLETE CBC W/AUTO DIFF WBC: CPT | Performed by: EMERGENCY MEDICINE

## 2019-05-15 PROCEDURE — 81025 URINE PREGNANCY TEST: CPT | Performed by: PHYSICIAN ASSISTANT

## 2019-05-15 PROCEDURE — 96374 THER/PROPH/DIAG INJ IV PUSH: CPT | Mod: 59

## 2019-05-15 PROCEDURE — 83690 ASSAY OF LIPASE: CPT | Performed by: EMERGENCY MEDICINE

## 2019-05-15 NOTE — ED AVS SNAPSHOT
[facility] Emergency Department  [address]  [facility] [address]  Phone:  [phone]  Fax:  [phone]                                    Evangelina De Anda   MRN: [id number]    Department:  [facility] Emergency Department   Date of Visit:  5/15/2019           After Visit Summary Signature Page    I have received my discharge instructions, and my questions have been answered. I have discussed any challenges I see with this plan with the nurse or doctor.    ..........................................................................................................................................  Patient/Patient Representative Signature      ..........................................................................................................................................  Patient Representative Print Name and Relationship to Patient    ..................................................               ................................................  Date                                   Time    ..........................................................................................................................................  Reviewed by Signature/Title    ...................................................              ..............................................  Date                                               Time          22EPIC Rev 08/18

## 2019-05-16 ENCOUNTER — APPOINTMENT (OUTPATIENT)
Dept: CT IMAGING | Facility: CLINIC | Age: 54
End: 2019-05-16
Attending: PHYSICIAN ASSISTANT
Payer: COMMERCIAL

## 2019-05-16 VITALS
RESPIRATION RATE: 16 BRPM | HEART RATE: 68 BPM | DIASTOLIC BLOOD PRESSURE: 61 MMHG | BODY MASS INDEX: 20.79 KG/M2 | WEIGHT: 132.72 LBS | SYSTOLIC BLOOD PRESSURE: 99 MMHG | TEMPERATURE: 99.1 F | OXYGEN SATURATION: 98 %

## 2019-05-16 LAB — HCG UR QL: NEGATIVE

## 2019-05-16 PROCEDURE — 25000125 ZZHC RX 250: Performed by: PHYSICIAN ASSISTANT

## 2019-05-16 PROCEDURE — 74177 CT ABD & PELVIS W/CONTRAST: CPT

## 2019-05-16 PROCEDURE — 25000128 H RX IP 250 OP 636: Performed by: PHYSICIAN ASSISTANT

## 2019-05-16 PROCEDURE — 25000132 ZZH RX MED GY IP 250 OP 250 PS 637: Performed by: PHYSICIAN ASSISTANT

## 2019-05-16 PROCEDURE — 96374 THER/PROPH/DIAG INJ IV PUSH: CPT | Mod: 59

## 2019-05-16 RX ORDER — ONDANSETRON 2 MG/ML
4 INJECTION INTRAMUSCULAR; INTRAVENOUS ONCE
Status: COMPLETED | OUTPATIENT
Start: 2019-05-16 | End: 2019-05-16

## 2019-05-16 RX ORDER — IOPAMIDOL 755 MG/ML
500 INJECTION, SOLUTION INTRAVASCULAR ONCE
Status: COMPLETED | OUTPATIENT
Start: 2019-05-16 | End: 2019-05-16

## 2019-05-16 RX ORDER — HYDROMORPHONE HYDROCHLORIDE 1 MG/ML
0.5 INJECTION, SOLUTION INTRAMUSCULAR; INTRAVENOUS; SUBCUTANEOUS
Status: DISCONTINUED | OUTPATIENT
Start: 2019-05-16 | End: 2019-05-16 | Stop reason: HOSPADM

## 2019-05-16 RX ADMIN — IOPAMIDOL 66 ML: 755 INJECTION, SOLUTION INTRAVENOUS at 00:41

## 2019-05-16 RX ADMIN — SODIUM CHLORIDE 57 ML: 9 INJECTION, SOLUTION INTRAVENOUS at 00:41

## 2019-05-16 RX ADMIN — ONDANSETRON 4 MG: 2 INJECTION INTRAMUSCULAR; INTRAVENOUS at 00:12

## 2019-05-16 RX ADMIN — LIDOCAINE HYDROCHLORIDE 30 ML: 20 SOLUTION ORAL; TOPICAL at 00:10

## 2019-05-16 ASSESSMENT — ENCOUNTER SYMPTOMS
DIARRHEA: 0
ABDOMINAL PAIN: 1
HEMATURIA: 0
SHORTNESS OF BREATH: 0
BACK PAIN: 0
ABDOMINAL DISTENTION: 1
VOMITING: 0
DYSURIA: 0
NAUSEA: 0
CONSTIPATION: 0

## 2019-05-16 NOTE — ED PROVIDER NOTES
History     Chief Complaint:  Abdominal pain    HPI   Evangelina De Anda is a 53 year old female who presents with her daughter to the ED for evaluation of abdominal pain. The patient reports an onset of gas, bloating, and abdominal pain earlier tonight that made it painful to even lay down and began radiating sharp pains into her back. This progressed for 3.5-4 hours tonight while trying to lay in bed. The pain sandra higher up in her abdomen, which prompted her arrival to the ED. Here, patient reports generalized abdominal pain that is worse with palpation. She also reports feeling very bloated. She took beano, which usually helps but didn't tonight. Here, the patient reports feeling improved but is still having mild abdominal pain. Patient does note that she gets gas frequently but never with pain like this. Her last bowel movement was yesterday, which was normal and denies noticing any black, tarry, or bloody stools. Now she is not having any sharp pains going into her back. Patient reports having no appetite since the onset of symptoms tonight. She denies any nausea, vomiting, diarrhea, constipation, dysuria, hematuria, fever, or any other symptoms. Of note, patient started taking birth control 10 days ago and began spotting 2 days ago, though her doctor told her this was normal.    Allergies:  No known drug allergies     Medications:    Temovate  Micronor    Past Medical History:    Alcohol abuse, in remission  Endometrial cells of cervical Pap smear inconsistent  MAYRA II  Tubular adenoma  Adjustment disorder with mixed anxiety and depressed mood  Lateral epicondylitis of left elbow  Anxiety  Tobacco use disorder    Past Surgical History:    Appendectomy  Excise vulva wide local   x2  T&A    Family History:    CAD  Cancer  Heart disease  MS    Social History:  Smoking status: Never  Alcohol use: No  Marital Status:       Review of Systems   Respiratory: Negative for shortness of breath.     Cardiovascular: Negative for chest pain.   Gastrointestinal: Positive for abdominal distention (bloating) and abdominal pain. Negative for constipation, diarrhea, nausea and vomiting.   Genitourinary: Negative for dysuria and hematuria.   Musculoskeletal: Negative for back pain.   All other systems reviewed and are negative.    Physical Exam     Patient Vitals for the past 24 hrs:   BP Temp Temp src Pulse Heart Rate Resp SpO2 Weight   05/16/19 0127 99/61 -- -- 68 -- 16 98 % --   05/16/19 0015 -- -- -- -- -- -- 97 % --   05/16/19 0000 112/66 -- -- -- -- -- 98 % --   05/15/19 2321 (!) 120/95 99.1  F (37.3  C) Oral -- 69 20 95 % 60.2 kg (132 lb 11.5 oz)     Physical Exam  General: Alert and cooperative with exam. Resting comfortably on gurney  Head:  Scalp is NC/AT  Eyes:  No scleral icterus, PERRL  ENT:  The external nose and ears are normal.   Neck:  Normal range of motion without rigidity.  CV:  Regular rate and rhythm    No pathologic murmur, rubs, or gallops.  Resp:  Breath sounds are clear bilaterally.  No crackles, wheezes, rhonchi.    Non-labored, no retractions or accessory muscle use  GI:  Abdomen is soft, no distension, diffuse tenderness to palpation, no masses. Voluntary guarding.  No peritoneal signs.  Bowel sounds present in all quadrants  :  No suprapubic or flank tenderness  MS:  No lower extremity edema or asymmetric calf swelling.  Skin:  Warm and dry, No rash or lesions noted.  Neuro: Oriented. No gross motor deficits.  Psych: Awake. Alert. Normal affect. Appropriate interactions.      Emergency Department Course     Imaging:  Radiographic findings were communicated with the patient and family who voiced understanding of the findings.    CT-scan Abdomen/Pelvis w/ contrast:  IMPRESSION:  1. No bowel obstruction or inflammation.  2. Small amount of free fluid in the pelvis. No focal inflammation.  Preliminary result per radiology.     Laboratory:  UA: Mucous present, o/w Negative  HCG qual:  Negative  CBC: WNL (WBC 6.5, HGB 13.3, )  CMP: Chloride 110 (H) o/w WNL (Creatinine 1.03)  Lipase: 299    Interventions:  0010: GI Cocktail - Maalox 15 mL, Viscous Lidocaine 15 mL, 30 mL suspension PO  0012: Zofran 4mg IV injection     Emergency Department Course:  Past medical records, nursing notes, and vitals reviewed.  2353: I performed an exam of the patient and obtained history, as documented above. GCS 15.    IV inserted and blood drawn.    The patient was sent for a CT while in the emergency department, findings above.    0059: I rechecked the patient. Findings and plan explained to the Patient. Patient discharged home with instructions regarding supportive care, medications, and reasons to return. The importance of close follow-up was reviewed.     Impression & Plan      Medical Decision Makin-year-old female who presents with abdominal pain.  Patient history and records reviewed.  Broad differential was considered.  The patient did have significant tenderness to palpation in the abdomen, and therefore I did obtain a CT scan which fortunately revealed no acute abnormalities.  The remainder of her work-up was non-concerning and demonstrated normal lipase, kidney function, liver function, white blood cell count.  There is no evidence of urinary tract infection and the patient is not pregnant.  I doubt pelvic inflammatory disease, ovarian torsion etc. I feel referred pain from cardiac or pulmonary etiologies are very unlikely given the patient's symptoms and obvious tenderness to palpation on abdominal exam.  She feels improved following treatment in the ED and I feel she is safe for discharge home at this time.  I discussed the importance of returning to the ED immediately if she develops any new or worsening things.  She will follow-up with her primary care provider in 2 to 3 days.  Recommended trial of Zantac for symptoms given her improvement following GI cocktail.    Diagnosis:    ICD-10-CM    1. Abdominal pain, generalized R10.84       Disposition:  discharged to home      Mary Lindsey  5/15/2019   [facility] EMERGENCY DEPARTMENT  I, Mary Portillo, am serving as a scribe at 11:53 PM on 5/15/2019 to document services personally performed by Sathya Madden PA-C based on my observations and the provider's statements to me.      Sathya Madden PA-C  05/16/19 1546

## 2019-05-28 ENCOUNTER — TELEPHONE (OUTPATIENT)
Dept: OBGYN | Facility: CLINIC | Age: 54
End: 2019-05-28

## 2019-05-28 DIAGNOSIS — Z30.011 ENCOUNTER FOR INITIAL PRESCRIPTION OF CONTRACEPTIVE PILLS: Primary | ICD-10-CM

## 2019-05-28 NOTE — TELEPHONE ENCOUNTER
Pt states that she is having mood swings, spotting, digestive upset since starting the OCP micronor.      She mentions a permanent form of birth control, but will make an appt to discuss with you.      In the meantime, she wonders if you have any suggestion for something else for her to use until she can get in tot see you.    Grazyna PRATHER R.N.  [facility]

## 2019-06-04 RX ORDER — NORETHINDRONE ACETATE AND ETHINYL ESTRADIOL .02; 1 MG/1; MG/1
1 TABLET ORAL DAILY
Qty: 90 TABLET | Refills: 3 | Status: ON HOLD | OUTPATIENT
Start: 2019-06-04 | End: 2019-09-04

## 2019-06-04 NOTE — TELEPHONE ENCOUNTER
We can switch to estrogen/progesterone and I will call that in.   Dr. Melinda Whitten, DO    Obstetrics and Gynecology  [facility]

## 2019-06-04 NOTE — TELEPHONE ENCOUNTER
Spoke with pt and gave info to  new rx.  Pt verbalizes understanding.    Grazyna PRATHER R.N.  [facility]

## 2019-07-18 NOTE — PROGRESS NOTES
SUBJECTIVE:  Evangelina De Anda is an 53 year old  woman who presents for   gynecology consult for contraception.  No LMP recorded. Periods are irregular.  Had symptoms on micronor. Now on junel low dose.   Doesn't feel good.     Concerns today:         Past Medical History:   Diagnosis Date     Alcohol abuse, in remission      Endometrial cells on cervical Pap smear inconsistent w/LMP 2012    endo bx WNL     MAYRA II (vulvar intraepithelial neoplasia II) 2012          Family History   Problem Relation Age of Onset     C.A.D. Father 65        heart attack and quadruble bypass     Cancer Father         spleen     Heart Disease Father      Hypertension Paternal Grandmother      Cerebrovascular Disease Maternal Grandmother      Cancer - colorectal Mother         in her 50's.     Neurologic Disorder Mother         MS- at age of 65     Heart Disease Sister 38         from massive heart attack at age of 38     Family History Negative Brother      Family History Negative Sister        Past Surgical History:   Procedure Laterality Date     APPENDECTOMY      age 6 yrs.     COLONOSCOPY  2015    Dr. Estella LAROSE     EXCISE VULVA WIDE LOCAL  7/15/2014    Procedure: EXCISE VULVA WIDE LOCAL;  Surgeon: Melinda Whitten DO;  Location: [facility] OR     GYN SURGERY           SURGICAL HISTORY OF -       C section     TONSILLECTOMY      T&A as a child       Current Outpatient Medications   Medication     clobetasol (TEMOVATE) 0.05 % external cream     ibuprofen (ADVIL,MOTRIN) 800 MG tablet     norethindrone (MICRONOR) 0.35 MG tablet     norethindrone-ethinyl estradiol (MICROGESTIN 1/20) 1-20 MG-MCG tablet     No current facility-administered medications for this visit.      Allergies   Allergen Reactions     Pollen Extract        Social History     Tobacco Use     Smoking status: Never Smoker     Smokeless tobacco: Never Used     Tobacco comment: 1 cig/day    Substance Use Topics     Alcohol use: No      "Alcohol/week: 0.0 oz     Comment: stopped 2014       Review Of Systems  Ears/Nose/Throat: negative  Respiratory: No shortness of breath, dyspnea on exertion, cough, or hemoptysis  Cardiovascular: negative  Gastrointestinal: negative  Genitourinary: See HPI    Constitutional, HEENT, cardiovascular, pulmonary, GI, , musculoskeletal, neuro, skin, endocrine and psych systems are negative, except as otherwise noted.    OBJECTIVE:  /70 (BP Location: Right arm, Patient Position: Chair, Cuff Size: Adult Regular)   Ht 1.702 m (5' 7\")   Wt 62.6 kg (138 lb)   LMP 2019 (Exact Date)   Breastfeeding? No   BMI 21.61 kg/m   General appearance: healthy, alert and no distress  Skin: Skin color, texture, turgor normal. No rashes or lesions.  Ears: negative  Nose/Sinuses: Nares normal. Septum midline. Mucosa normal. No drainage or sinus tenderness.  Oropharynx: Lips, mucosa, and tongue normal. Teeth and gums normal.  Neck: Neck supple. No adenopathy. Thyroid symmetric, normal size,, Carotids without bruits.  Lungs: negative, Percussion normal. Good diaphragmatic excursion. Lungs clear  Heart: negative, PMI normal. No lifts, heaves, or thrills. RRR. No murmurs, clicks gallops or rub  Breasts: deferred  Abdomen: Abdomen soft, non-tender. BS normal. No masses, organomegaly      ASSESSMENT:  Evangelina De Anda is an 53 year old  woman who presents for   gynecology consult for contraception.  No LMP recorded. Periods are irregular.  Had symptoms on micronor. Now on junel low dose.     PLAN:  Dx:  1)  Contraception: on Oral Contraceptive, wonders about permanent sterilization.  Would like permanent sterilization, with bilateral salpingectomy, laparoscopic orders placed.   Risks benefits alternatives d/w patient    2) Tobacco abuse: would like to quit. Rx chantix given.      Dr. Melinda Whitten, DO    Obstetrics and Gynecology  [facility]       "

## 2019-07-22 ENCOUNTER — TELEPHONE (OUTPATIENT)
Dept: OBGYN | Facility: CLINIC | Age: 54
End: 2019-07-22

## 2019-07-22 ENCOUNTER — OFFICE VISIT (OUTPATIENT)
Dept: OBGYN | Facility: CLINIC | Age: 54
End: 2019-07-22
Payer: COMMERCIAL

## 2019-07-22 VITALS
HEIGHT: 67 IN | BODY MASS INDEX: 21.66 KG/M2 | SYSTOLIC BLOOD PRESSURE: 110 MMHG | WEIGHT: 138 LBS | DIASTOLIC BLOOD PRESSURE: 70 MMHG

## 2019-07-22 DIAGNOSIS — Z72.0 TOBACCO ABUSE: ICD-10-CM

## 2019-07-22 DIAGNOSIS — Z30.09 STERILIZATION CONSULT: Primary | ICD-10-CM

## 2019-07-22 PROCEDURE — 99213 OFFICE O/P EST LOW 20 MIN: CPT | Performed by: FAMILY MEDICINE

## 2019-07-22 ASSESSMENT — MIFFLIN-ST. JEOR: SCORE: 1263.59

## 2019-07-22 NOTE — TELEPHONE ENCOUNTER
Procedure name(s) - multi select laparoscopic bilateral salpingectomy    Reason for procedure desires permanent sterilization    Surgeon: Maria Dolores    Is this a multi surgeon case? No    Laterality Bilateral    Request for additional equipment Other (see comments) None   Anesthesia General    Initiate Pre-op orders for above procedure: Yes, as ordered in Epic Additional orders noted there also   Location of Case: [facility]    Surgeon Procedure Time (incision to closure) in minutes (per procedure as applicable) 60    Note:  Surgical Case Time Needed (in minutes)   Date of Surgery (Requested): 8/1/2019    Patient Class (for admit prior to surgery, specify number of days in comments): Same day (hospital outpatient)    Why can t this outpatient surgery be done at the [facility] or  ASC? NA    H&P To Be Completed By: PCP    Post-Op Appointment 1.5 week    Bowel Prep: No    Vendor Needed? No

## 2019-07-22 NOTE — NURSING NOTE
"Chief Complaint   Patient presents with     Follow Up     birth control       Initial /70 (BP Location: Right arm, Patient Position: Chair, Cuff Size: Adult Regular)   Ht 1.702 m (5' 7\")   Wt 62.6 kg (138 lb)   LMP 2019 (Exact Date)   Breastfeeding? No   BMI 21.61 kg/m   Estimated body mass index is 21.61 kg/m  as calculated from the following:    Height as of this encounter: 1.702 m (5' 7\").    Weight as of this encounter: 62.6 kg (138 lb).  BP completed using cuff size: regular    Questioned patient about current smoking habits.  Pt. has never smoked.          The following HM Due: NONE    Jeanine Dunn CMA    "

## 2019-07-23 NOTE — TELEPHONE ENCOUNTER
Type of surgery: laparoscopic bilateral salpingectomy  Location of surgery: [facility]  Date and time of surgery: 9/4/19 @ 12:00 pm  Surgeon: Dr. Whitten  Pre-Op Appt Date: Patient advised to schedule.  Post-Op Appt Date: 9/12/19   Packet sent out: Yes  Pre-cert/Authorization completed:  No  Date: 7/23/19

## 2019-08-29 ENCOUNTER — OFFICE VISIT (OUTPATIENT)
Dept: FAMILY MEDICINE | Facility: CLINIC | Age: 54
End: 2019-08-29
Payer: COMMERCIAL

## 2019-08-29 VITALS
OXYGEN SATURATION: 98 % | BODY MASS INDEX: 21.39 KG/M2 | HEIGHT: 66 IN | TEMPERATURE: 98.9 F | RESPIRATION RATE: 17 BRPM | WEIGHT: 133.1 LBS | DIASTOLIC BLOOD PRESSURE: 60 MMHG | SYSTOLIC BLOOD PRESSURE: 120 MMHG | HEART RATE: 60 BPM

## 2019-08-29 DIAGNOSIS — Z01.818 PRE-OP EXAM: ICD-10-CM

## 2019-08-29 DIAGNOSIS — Z12.31 VISIT FOR SCREENING MAMMOGRAM: Primary | ICD-10-CM

## 2019-08-29 LAB
ANION GAP SERPL CALCULATED.3IONS-SCNC: 6 MMOL/L (ref 3–14)
BUN SERPL-MCNC: 18 MG/DL (ref 7–30)
CALCIUM SERPL-MCNC: 9 MG/DL (ref 8.5–10.1)
CHLORIDE SERPL-SCNC: 110 MMOL/L (ref 94–109)
CO2 SERPL-SCNC: 25 MMOL/L (ref 20–32)
CREAT SERPL-MCNC: 1.1 MG/DL (ref 0.52–1.04)
ERYTHROCYTE [DISTWIDTH] IN BLOOD BY AUTOMATED COUNT: 12.4 % (ref 10–15)
GFR SERPL CREATININE-BSD FRML MDRD: 57 ML/MIN/{1.73_M2}
GLUCOSE SERPL-MCNC: 90 MG/DL (ref 70–99)
HCT VFR BLD AUTO: 40 % (ref 35–47)
HGB BLD-MCNC: 13.5 G/DL (ref 11.7–15.7)
MCH RBC QN AUTO: 31.4 PG (ref 26.5–33)
MCHC RBC AUTO-ENTMCNC: 33.8 G/DL (ref 31.5–36.5)
MCV RBC AUTO: 93 FL (ref 78–100)
PLATELET # BLD AUTO: 233 10E9/L (ref 150–450)
POTASSIUM SERPL-SCNC: 4.6 MMOL/L (ref 3.4–5.3)
RBC # BLD AUTO: 4.3 10E12/L (ref 3.8–5.2)
SODIUM SERPL-SCNC: 141 MMOL/L (ref 133–144)
WBC # BLD AUTO: 7.1 10E9/L (ref 4–11)

## 2019-08-29 PROCEDURE — 85027 COMPLETE CBC AUTOMATED: CPT | Performed by: PHYSICIAN ASSISTANT

## 2019-08-29 PROCEDURE — 80048 BASIC METABOLIC PNL TOTAL CA: CPT | Performed by: PHYSICIAN ASSISTANT

## 2019-08-29 PROCEDURE — 99214 OFFICE O/P EST MOD 30 MIN: CPT | Performed by: PHYSICIAN ASSISTANT

## 2019-08-29 PROCEDURE — 36415 COLL VENOUS BLD VENIPUNCTURE: CPT | Performed by: PHYSICIAN ASSISTANT

## 2019-08-29 ASSESSMENT — PATIENT HEALTH QUESTIONNAIRE - PHQ9: SUM OF ALL RESPONSES TO PHQ QUESTIONS 1-9: 0

## 2019-08-29 ASSESSMENT — MIFFLIN-ST. JEOR: SCORE: 1225.49

## 2019-08-29 NOTE — PROGRESS NOTES
[facility]  [address] [facility] [id number] [phone]  Dept: [phone]    PRE-OP EVALUATION:  Today's date: 2019    Evangelina De Anda (: 1965) presents for pre-operative evaluation assessment as requested by Dr. macedo.  She requires evaluation and anesthesia risk assessment prior to undergoing surgery/procedure for treatment of tubal ligation   Fax number for surgical facility: [facility]   Primary Physician: Aaseby-Aguilera, Ramona Ann  Type of Anesthesia Anticipated: General    Patient has a Health Care Directive or Living Will:  NO    Preop Questions 2019   Who is doing your surgery? dr hankins   What are you having done? tubes tied   Date of Surgery/Procedure:    Facility or Hospital where procedure/surgery will be performed: [facility]   1.  Do you have a history of Heart attack, stroke, stent, coronary bypass surgery, or other heart surgery? No   2.  Do you ever have any pain or discomfort in your chest? No   3.  Do you have a history of  Heart Failure? No   4.   Are you troubled by shortness of breath when:  walking on a level surface, or up a slight hill, or at night? No   5.  Do you currently have a cold, bronchitis or other respiratory infection? No   6.  Do you have a cough, shortness of breath, or wheezing? No   7.  Do you sometimes get pains in the calves of your legs when you walk? No   8. Do you or anyone in your family have previous history of blood clots? No   9.  Do you or does anyone in your family have a serious bleeding problem such as prolonged bleeding following surgeries or cuts? No   10. Have you ever had problems with anemia or been told to take iron pills? No   11. Have you had any abnormal blood loss such as black, tarry or bloody stools, or abnormal vaginal bleeding? No   12. Have you ever had a blood transfusion? No   13. Have you or any of your relatives ever had problems with anesthesia? No   14. Do  you have sleep apnea, excessive snoring or daytime drowsiness? No   15. Do you have any prosthetic heart valves? No   16. Do you have prosthetic joints? No   17. Is there any chance that you may be pregnant? UNKNOWN -          HPI:     HPI related to upcoming procedure: not handling oral birth control due to nausea      See problem list for active medical problems.  Problems all longstanding and stable, except as noted/documented.  See ROS for pertinent symptoms related to these conditions.      MEDICAL HISTORY:     Patient Active Problem List    Diagnosis Date Noted     Lateral epicondylitis of left elbow 04/26/2018     Priority: Medium     MAYRA II (vulvar intraepithelial neoplasia II) 06/17/2014     Priority: Medium     Anxiety 10/08/2012     Priority: Medium     Unexplained endometrial cells on cervical Pap smear 09/24/2012     Priority: Medium     9/24/12: Endometrial cells on pap, Irregular cycles, needs endo bx with gyn.  12/3/12: endo bx WNL. Vulvar bx MAYRA 2. Plan removal.  03/10/17: NIL pap, Endometrial cells seen on pap. LMP was 03/10/17 and are irregular, just went 3 months without one now has had 2 periods within the last 2 months. No intermenstrual bleeding. Neg HR HPV result. Plan Endometrial Bx per provider.  04/10/17: Endometrial Bx was normal.  04/22/19: NIL Pap, Neg HR HPV result. Plan cotest in 3 years.         Tubular adenoma 07/04/2012     Priority: Medium     Family history of colon cancer 05/10/2012     Priority: Medium     Family history of coronary artery disease 05/10/2012     Priority: Medium     Adjustment disorder with mixed anxiety and depressed mood 03/28/2011     Priority: Medium     Tobacco use disorder 09/16/2008     Priority: Medium      Past Medical History:   Diagnosis Date     Alcohol abuse, in remission      Endometrial cells on cervical Pap smear inconsistent w/LMP 9/2012    endo bx WNL     MAYRA II (vulvar intraepithelial neoplasia II) 12/2012     Past Surgical History:    Procedure Laterality Date     APPENDECTOMY      age 6 yrs.     COLONOSCOPY  2015    Dr. Estella LAROSE     EXCISE VULVA WIDE LOCAL  7/15/2014    Procedure: EXCISE VULVA WIDE LOCAL;  Surgeon: Melinda Whitten DO;  Location: [facility] OR     GYN SURGERY           SURGICAL HISTORY OF -       C section     TONSILLECTOMY      T&A as a child     Current Outpatient Medications   Medication Sig Dispense Refill     clobetasol (TEMOVATE) 0.05 % external cream Apply topically three times a week 70 g 3     ibuprofen (ADVIL,MOTRIN) 800 MG tablet Take 1 tablet (800 mg) by mouth every 8 hours as needed for moderate pain (Patient not taking: Reported on 2019) 90 tablet 1     norethindrone-ethinyl estradiol (MICROGESTIN ) 1-20 MG-MCG tablet Take 1 tablet by mouth daily 90 tablet 3     OTC products: no recent use of OTC ASA, NSAIDS or Steroids and no use of herbal medications or other supplements    Allergies   Allergen Reactions     Pollen Extract       Latex Allergy: NO    Social History     Tobacco Use     Smoking status: Former Smoker     Types: Cigarettes     Last attempt to quit: 2019     Years since quittin.0     Smokeless tobacco: Never Used     Tobacco comment: 1 cig/day    Substance Use Topics     Alcohol use: No     Alcohol/week: 0.0 oz     Comment: stopped 2014     History   Drug Use No       REVIEW OF SYSTEMS:   Constitutional, neuro, ENT, endocrine, pulmonary, cardiac, gastrointestinal, genitourinary, musculoskeletal, integument and psychiatric systems are negative, except as otherwise noted.    EXAM:   There were no vitals taken for this visit.    GENERAL APPEARANCE: healthy, alert and no distress     EYES: EOMI,  PERRL     HENT: ear canals and TM's normal and nose and mouth without ulcers or lesions     NECK: no adenopathy, no asymmetry, masses, or scars and thyroid normal to palpation     RESP: lungs clear to auscultation - no rales, rhonchi or wheezes     CV: regular rates  and rhythm, normal S1 S2, no S3 or S4 and no murmur, click or rub     ABDOMEN:  soft, nontender, no HSM or masses and bowel sounds normal     MS: extremities normal- no gross deformities noted, no evidence of inflammation in joints, FROM in all extremities.     SKIN: no suspicious lesions or rashes     NEURO: Normal strength and tone, sensory exam grossly normal, mentation intact and speech normal     PSYCH: mentation appears normal. and affect normal/bright     LYMPHATICS: No cervical adenopathy    DIAGNOSTICS:   EKG: Not indicated due to non-vascular surgery and low risk of event (age <65 and without cardiac risk factors)    Recent Labs   Lab Test 05/15/19  2328 03/18/17  0815   HGB 13.3 12.6    276    141   POTASSIUM 4.0 4.2   CR 1.03 0.87      Results for orders placed or performed in visit on 08/29/19   CBC with platelets   Result Value Ref Range    WBC 7.1 4.0 - 11.0 10e9/L    RBC Count 4.30 3.8 - 5.2 10e12/L    Hemoglobin 13.5 11.7 - 15.7 g/dL    Hematocrit 40.0 35.0 - 47.0 %    MCV 93 78 - 100 fl    MCH 31.4 26.5 - 33.0 pg    MCHC 33.8 31.5 - 36.5 g/dL    RDW 12.4 10.0 - 15.0 %    Platelet Count 233 150 - 450 10e9/L   Basic metabolic panel   Result Value Ref Range    Sodium 141 133 - 144 mmol/L    Potassium 4.6 3.4 - 5.3 mmol/L    Chloride 110 (H) 94 - 109 mmol/L    Carbon Dioxide 25 20 - 32 mmol/L    Anion Gap 6 3 - 14 mmol/L    Glucose 90 70 - 99 mg/dL    Urea Nitrogen 18 7 - 30 mg/dL    Creatinine 1.10 (H) 0.52 - 1.04 mg/dL    GFR Estimate 57 (L) >60 mL/min/[1.73_m2]    GFR Estimate If Black 66 >60 mL/min/[1.73_m2]    Calcium 9.0 8.5 - 10.1 mg/dL     IMPRESSION:   Reason for surgery/procedure: tubal ligation   Diagnosis/reason for consult:  preoperative evaluation for assessment of cardiovascular and respiratory disease as well as overall risk assessment and perioperative medical management.    The proposed surgical procedure is considered LOW risk.    REVISED CARDIAC RISK INDEX  The patient  has the following serious cardiovascular risks for perioperative complications such as (MI, PE, VFib and 3  AV Block):  No serious cardiac risks  INTERPRETATION: 0 risks: Class I (very low risk - 0.4% complication rate)    The patient has the following additional risks for perioperative complications:  No identified additional risks      ICD-10-CM    1. Visit for screening mammogram Z12.31        RECOMMENDATIONS:     --Consult hospital rounder / IM to assist post-op medical management    --Patient is to take all scheduled medications on the day of surgery EXCEPT for modifications listed below.    APPROVAL GIVEN to proceed with proposed procedure, without further diagnostic evaluation       Signed Electronically by: Ramona Ann Aaseby-Aguilera, PA-C    Copy of this evaluation report is provided to requesting physician.    [facility] Preop Guidelines    Revised Cardiac Risk Index

## 2019-09-04 ENCOUNTER — ANESTHESIA (OUTPATIENT)
Dept: SURGERY | Facility: CLINIC | Age: 54
End: 2019-09-04
Payer: COMMERCIAL

## 2019-09-04 ENCOUNTER — HOSPITAL ENCOUNTER (OUTPATIENT)
Facility: CLINIC | Age: 54
Discharge: HOME | End: 2019-09-04
Attending: FAMILY MEDICINE | Admitting: FAMILY MEDICINE
Payer: COMMERCIAL

## 2019-09-04 ENCOUNTER — ANESTHESIA EVENT (OUTPATIENT)
Dept: SURGERY | Facility: CLINIC | Age: 54
End: 2019-09-04
Payer: COMMERCIAL

## 2019-09-04 VITALS
OXYGEN SATURATION: 99 % | HEART RATE: 47 BPM | SYSTOLIC BLOOD PRESSURE: 126 MMHG | TEMPERATURE: 97.3 F | DIASTOLIC BLOOD PRESSURE: 82 MMHG | RESPIRATION RATE: 15 BRPM

## 2019-09-04 DIAGNOSIS — Z98.890 S/P LAPAROSCOPY: Primary | ICD-10-CM

## 2019-09-04 LAB — HCG UR QL: NEGATIVE

## 2019-09-04 PROCEDURE — 40000305 ZZH STATISTIC PRE PROC ASSESS I: Performed by: FAMILY MEDICINE

## 2019-09-04 PROCEDURE — 88302 TISSUE EXAM BY PATHOLOGIST: CPT | Performed by: FAMILY MEDICINE

## 2019-09-04 PROCEDURE — 25800030 ZZH RX IP 258 OP 636: Performed by: ANESTHESIOLOGY

## 2019-09-04 PROCEDURE — 37000008 ZZH ANESTHESIA TECHNICAL FEE, 1ST 30 MIN: Performed by: FAMILY MEDICINE

## 2019-09-04 PROCEDURE — 71000013 ZZH RECOVERY PHASE 1 LEVEL 1 EA ADDTL HR: Performed by: FAMILY MEDICINE

## 2019-09-04 PROCEDURE — 25000128 H RX IP 250 OP 636: Performed by: NURSE ANESTHETIST, CERTIFIED REGISTERED

## 2019-09-04 PROCEDURE — 88302 TISSUE EXAM BY PATHOLOGIST: CPT | Mod: 26 | Performed by: FAMILY MEDICINE

## 2019-09-04 PROCEDURE — 71000027 ZZH RECOVERY PHASE 2 EACH 15 MINS: Performed by: FAMILY MEDICINE

## 2019-09-04 PROCEDURE — 58661 LAPAROSCOPY REMOVE ADNEXA: CPT | Performed by: FAMILY MEDICINE

## 2019-09-04 PROCEDURE — 25000132 ZZH RX MED GY IP 250 OP 250 PS 637: Performed by: ANESTHESIOLOGY

## 2019-09-04 PROCEDURE — 81025 URINE PREGNANCY TEST: CPT | Performed by: ANESTHESIOLOGY

## 2019-09-04 PROCEDURE — 25000125 ZZHC RX 250: Performed by: NURSE ANESTHETIST, CERTIFIED REGISTERED

## 2019-09-04 PROCEDURE — 27210794 ZZH OR GENERAL SUPPLY STERILE: Performed by: FAMILY MEDICINE

## 2019-09-04 PROCEDURE — 25000125 ZZHC RX 250: Performed by: FAMILY MEDICINE

## 2019-09-04 PROCEDURE — 36000056 ZZH SURGERY LEVEL 3 1ST 30 MIN: Performed by: FAMILY MEDICINE

## 2019-09-04 PROCEDURE — 36000058 ZZH SURGERY LEVEL 3 EA 15 ADDTL MIN: Performed by: FAMILY MEDICINE

## 2019-09-04 PROCEDURE — 71000012 ZZH RECOVERY PHASE 1 LEVEL 1 FIRST HR: Performed by: FAMILY MEDICINE

## 2019-09-04 PROCEDURE — 37000009 ZZH ANESTHESIA TECHNICAL FEE, EACH ADDTL 15 MIN: Performed by: FAMILY MEDICINE

## 2019-09-04 RX ORDER — CEFAZOLIN SODIUM 2 G/100ML
2 INJECTION, SOLUTION INTRAVENOUS
Status: DISCONTINUED | OUTPATIENT
Start: 2019-09-04 | End: 2019-09-04 | Stop reason: HOSPADM

## 2019-09-04 RX ORDER — FENTANYL CITRATE 50 UG/ML
25-50 INJECTION, SOLUTION INTRAMUSCULAR; INTRAVENOUS
Status: DISCONTINUED | OUTPATIENT
Start: 2019-09-04 | End: 2019-09-04 | Stop reason: HOSPADM

## 2019-09-04 RX ORDER — LIDOCAINE HYDROCHLORIDE 10 MG/ML
INJECTION, SOLUTION INFILTRATION; PERINEURAL PRN
Status: DISCONTINUED | OUTPATIENT
Start: 2019-09-04 | End: 2019-09-04

## 2019-09-04 RX ORDER — SODIUM CHLORIDE, SODIUM LACTATE, POTASSIUM CHLORIDE, CALCIUM CHLORIDE 600; 310; 30; 20 MG/100ML; MG/100ML; MG/100ML; MG/100ML
INJECTION, SOLUTION INTRAVENOUS CONTINUOUS
Status: DISCONTINUED | OUTPATIENT
Start: 2019-09-04 | End: 2019-09-04 | Stop reason: HOSPADM

## 2019-09-04 RX ORDER — FENTANYL CITRATE 50 UG/ML
25-50 INJECTION, SOLUTION INTRAMUSCULAR; INTRAVENOUS EVERY 5 MIN PRN
Status: DISCONTINUED | OUTPATIENT
Start: 2019-09-04 | End: 2019-09-04 | Stop reason: HOSPADM

## 2019-09-04 RX ORDER — NEOSTIGMINE METHYLSULFATE 1 MG/ML
VIAL (ML) INJECTION PRN
Status: DISCONTINUED | OUTPATIENT
Start: 2019-09-04 | End: 2019-09-04

## 2019-09-04 RX ORDER — PROPOFOL 10 MG/ML
INJECTION, EMULSION INTRAVENOUS PRN
Status: DISCONTINUED | OUTPATIENT
Start: 2019-09-04 | End: 2019-09-04

## 2019-09-04 RX ORDER — HYDROCODONE BITARTRATE AND ACETAMINOPHEN 5; 325 MG/1; MG/1
1 TABLET ORAL EVERY 6 HOURS PRN
Qty: 18 TABLET | Refills: 0 | Status: SHIPPED | OUTPATIENT
Start: 2019-09-04 | End: 2019-12-05

## 2019-09-04 RX ORDER — PROPOFOL 10 MG/ML
INJECTION, EMULSION INTRAVENOUS CONTINUOUS PRN
Status: DISCONTINUED | OUTPATIENT
Start: 2019-09-04 | End: 2019-09-04

## 2019-09-04 RX ORDER — BUPIVACAINE HYDROCHLORIDE AND EPINEPHRINE 5; 5 MG/ML; UG/ML
INJECTION, SOLUTION PERINEURAL PRN
Status: DISCONTINUED | OUTPATIENT
Start: 2019-09-04 | End: 2019-09-04 | Stop reason: HOSPADM

## 2019-09-04 RX ORDER — ONDANSETRON 2 MG/ML
4 INJECTION INTRAMUSCULAR; INTRAVENOUS EVERY 30 MIN PRN
Status: DISCONTINUED | OUTPATIENT
Start: 2019-09-04 | End: 2019-09-04 | Stop reason: HOSPADM

## 2019-09-04 RX ORDER — DEXAMETHASONE SODIUM PHOSPHATE 4 MG/ML
INJECTION, SOLUTION INTRA-ARTICULAR; INTRALESIONAL; INTRAMUSCULAR; INTRAVENOUS; SOFT TISSUE PRN
Status: DISCONTINUED | OUTPATIENT
Start: 2019-09-04 | End: 2019-09-04

## 2019-09-04 RX ORDER — NALOXONE HYDROCHLORIDE 0.4 MG/ML
.1-.4 INJECTION, SOLUTION INTRAMUSCULAR; INTRAVENOUS; SUBCUTANEOUS
Status: DISCONTINUED | OUTPATIENT
Start: 2019-09-04 | End: 2019-09-04 | Stop reason: HOSPADM

## 2019-09-04 RX ORDER — KETOROLAC TROMETHAMINE 30 MG/ML
30 INJECTION, SOLUTION INTRAMUSCULAR; INTRAVENOUS ONCE
Status: DISCONTINUED | OUTPATIENT
Start: 2019-09-04 | End: 2019-09-04

## 2019-09-04 RX ORDER — ACETAMINOPHEN 325 MG/1
975 TABLET ORAL ONCE
Status: COMPLETED | OUTPATIENT
Start: 2019-09-04 | End: 2019-09-04

## 2019-09-04 RX ORDER — HYDROMORPHONE HYDROCHLORIDE 1 MG/ML
.3-.5 INJECTION, SOLUTION INTRAMUSCULAR; INTRAVENOUS; SUBCUTANEOUS EVERY 10 MIN PRN
Status: DISCONTINUED | OUTPATIENT
Start: 2019-09-04 | End: 2019-09-04 | Stop reason: HOSPADM

## 2019-09-04 RX ORDER — MEPERIDINE HYDROCHLORIDE 50 MG/ML
12.5 INJECTION INTRAMUSCULAR; INTRAVENOUS; SUBCUTANEOUS
Status: DISCONTINUED | OUTPATIENT
Start: 2019-09-04 | End: 2019-09-04 | Stop reason: HOSPADM

## 2019-09-04 RX ORDER — ONDANSETRON 4 MG/1
4 TABLET, ORALLY DISINTEGRATING ORAL EVERY 30 MIN PRN
Status: DISCONTINUED | OUTPATIENT
Start: 2019-09-04 | End: 2019-09-04 | Stop reason: HOSPADM

## 2019-09-04 RX ORDER — LIDOCAINE 40 MG/G
CREAM TOPICAL
Status: DISCONTINUED | OUTPATIENT
Start: 2019-09-04 | End: 2019-09-04 | Stop reason: HOSPADM

## 2019-09-04 RX ORDER — FENTANYL CITRATE 50 UG/ML
INJECTION, SOLUTION INTRAMUSCULAR; INTRAVENOUS PRN
Status: DISCONTINUED | OUTPATIENT
Start: 2019-09-04 | End: 2019-09-04

## 2019-09-04 RX ORDER — CELECOXIB 200 MG/1
200 CAPSULE ORAL ONCE
Status: COMPLETED | OUTPATIENT
Start: 2019-09-04 | End: 2019-09-04

## 2019-09-04 RX ORDER — ONDANSETRON 2 MG/ML
INJECTION INTRAMUSCULAR; INTRAVENOUS PRN
Status: DISCONTINUED | OUTPATIENT
Start: 2019-09-04 | End: 2019-09-04

## 2019-09-04 RX ORDER — GLYCOPYRROLATE 0.2 MG/ML
INJECTION, SOLUTION INTRAMUSCULAR; INTRAVENOUS PRN
Status: DISCONTINUED | OUTPATIENT
Start: 2019-09-04 | End: 2019-09-04

## 2019-09-04 RX ORDER — ACETAMINOPHEN 325 MG/1
975 TABLET ORAL ONCE
Status: DISCONTINUED | OUTPATIENT
Start: 2019-09-04 | End: 2019-09-04 | Stop reason: HOSPADM

## 2019-09-04 RX ORDER — KETOROLAC TROMETHAMINE 30 MG/ML
INJECTION, SOLUTION INTRAMUSCULAR; INTRAVENOUS PRN
Status: DISCONTINUED | OUTPATIENT
Start: 2019-09-04 | End: 2019-09-04

## 2019-09-04 RX ORDER — CEFAZOLIN SODIUM 1 G/3ML
1 INJECTION, POWDER, FOR SOLUTION INTRAMUSCULAR; INTRAVENOUS SEE ADMIN INSTRUCTIONS
Status: DISCONTINUED | OUTPATIENT
Start: 2019-09-04 | End: 2019-09-04 | Stop reason: HOSPADM

## 2019-09-04 RX ORDER — GABAPENTIN 300 MG/1
300 CAPSULE ORAL ONCE
Status: COMPLETED | OUTPATIENT
Start: 2019-09-04 | End: 2019-09-04

## 2019-09-04 RX ADMIN — ROCURONIUM BROMIDE 30 MG: 10 INJECTION INTRAVENOUS at 13:17

## 2019-09-04 RX ADMIN — SODIUM CHLORIDE, POTASSIUM CHLORIDE, SODIUM LACTATE AND CALCIUM CHLORIDE: 600; 310; 30; 20 INJECTION, SOLUTION INTRAVENOUS at 13:12

## 2019-09-04 RX ADMIN — ONDANSETRON HYDROCHLORIDE 4 MG: 2 INJECTION, SOLUTION INTRAVENOUS at 13:17

## 2019-09-04 RX ADMIN — GLYCOPYRROLATE 0.2 MG: 0.2 INJECTION, SOLUTION INTRAMUSCULAR; INTRAVENOUS at 13:58

## 2019-09-04 RX ADMIN — GABAPENTIN 300 MG: 300 CAPSULE ORAL at 11:30

## 2019-09-04 RX ADMIN — ACETAMINOPHEN 975 MG: 325 TABLET ORAL at 11:29

## 2019-09-04 RX ADMIN — SODIUM CHLORIDE, POTASSIUM CHLORIDE, SODIUM LACTATE AND CALCIUM CHLORIDE: 600; 310; 30; 20 INJECTION, SOLUTION INTRAVENOUS at 14:59

## 2019-09-04 RX ADMIN — PROPOFOL 200 MG: 10 INJECTION, EMULSION INTRAVENOUS at 13:17

## 2019-09-04 RX ADMIN — CELECOXIB 200 MG: 200 CAPSULE ORAL at 11:30

## 2019-09-04 RX ADMIN — KETOROLAC TROMETHAMINE 15 MG: 30 INJECTION, SOLUTION INTRAMUSCULAR at 13:17

## 2019-09-04 RX ADMIN — PROPOFOL 75 MCG/KG/MIN: 10 INJECTION, EMULSION INTRAVENOUS at 13:17

## 2019-09-04 RX ADMIN — LIDOCAINE HYDROCHLORIDE 50 MG: 10 INJECTION, SOLUTION INFILTRATION; PERINEURAL at 13:17

## 2019-09-04 RX ADMIN — DEXAMETHASONE SODIUM PHOSPHATE 8 MG: 4 INJECTION, SOLUTION INTRA-ARTICULAR; INTRALESIONAL; INTRAMUSCULAR; INTRAVENOUS; SOFT TISSUE at 13:17

## 2019-09-04 RX ADMIN — FENTANYL CITRATE 150 MCG: 50 INJECTION, SOLUTION INTRAMUSCULAR; INTRAVENOUS at 13:17

## 2019-09-04 RX ADMIN — GLYCOPYRROLATE 0.2 MG: 0.2 INJECTION, SOLUTION INTRAMUSCULAR; INTRAVENOUS at 13:17

## 2019-09-04 RX ADMIN — Medication 2 MG: at 13:58

## 2019-09-04 ASSESSMENT — ENCOUNTER SYMPTOMS
SEIZURES: 0
DYSRHYTHMIAS: 0

## 2019-09-04 ASSESSMENT — LIFESTYLE VARIABLES: TOBACCO_USE: 1

## 2019-09-04 NOTE — ANESTHESIA POSTPROCEDURE EVALUATION
Patient: Evangelina De Anda    Procedure(s):  Laparoscopic bilateral salpingectomy    Diagnosis:desires permenant sterilization  Diagnosis Additional Information: Desired sterilization  Dr. Whitten    Anesthesia Type:  General, ETT    Note:  Anesthesia Post Evaluation    Patient location during evaluation: PACU  Patient participation: Able to fully participate in evaluation  Level of consciousness: awake  Pain management: adequate  Airway patency: patent  Cardiovascular status: acceptable  Respiratory status: acceptable  Hydration status: acceptable  PONV: none             Last vitals:  Vitals:    09/04/19 1435 09/04/19 1445 09/04/19 1500   BP: 104/65 112/65 116/70   Pulse: 62 51 (!) 47   Resp: 17 17 10   Temp:      SpO2: 100% 99% 99%         Electronically Signed By: Franky Schmitt MD  September 4, 2019  3:14 PM

## 2019-09-04 NOTE — OP NOTE
Pre-procedure diagnosis:  54 y/o   desires sterilization.   Post-procedure diagnosis:  54 y/o   desires sterilization.   Procedure: Laparoscopic bilateral salpingectomy  Surgeon: Dr. Whitten   Assistant:  none  Complications: none  Findings:  Normal right and left adnexa, normal uterus   EBL: 5 cc   Indications: 52 Y/o    desires sterilization.    I counseled her on laparoscopic bilateral salpingectomy vs tubal ligation.  She desires procedure as stated above.      Procedure: After informed consent was obtained, the patient was taken to the operating room and placed under general anesthesia. She was prepped and draped in the normal fashion. A time out was performed.  After informed consent was obtained, the patient was taken to the operating room where she was placed in dorsal supine position and placed under general anesthesia without difficulty. Exam under anesthesia reveals the findings above. The patient was prepped and draped in normal sterile fashion. The bladder is drained via straight catheterization. The bivalve speculum was placed in the patient's vaginal vault. Anterior lip of the cervix was grasped with a single-tooth tenaculum. And a Blue Box clip manipulator is applied. Attention was turned to the patient's abdomen, where the [place] clamps were clamped on umbilicus and a Veress needle was placed directly. The saline is placed to flow through, and it flowed through nicely. Intraoperative pressure when the gas was placed on was noted to be 6 mmHg, 3.5 liters of CO2 was insufflated.  A 5 mm incision is made with the scalpel and under direct visualization a 5 mm trocar is placed with the 5 mm laparoscope in place. Confirmation of intraabdominal placement is obtained via laparoscope. A 5 mm left upper and left lower quandrant ports are placed under direct visualization after peritoneal mapping was performed. Findings are noted above.  Attention was turned to the right fallopian tube  which was identified and followed out to the fimbria.  A right salpingectomy was performed.  It was hemostatic.  Attention was turned to the left fallopian tube which was identified and followed out to the fimbria. A left salpingectomy was performed.  After this was completed the ports and CO2 was removed and the incisions were closed with 4-0 monocryl in a subcuticular fashion and dermabond was applied. The hulka manipulator was removed from the patient's anterior lip of the cervix. Hemostasis of the cervix was noted.   The patient tolerated the procedure well and was awakened from anesthesia and taken to the recovery room in stable condition. Sponge, lap and needle counts are correct x 2.   Dr. Melinda Whitten, DO

## 2019-09-04 NOTE — ANESTHESIA CARE TRANSFER NOTE
Patient: Evangelina De Anda    Procedure(s):  Laparoscopic bilateral salpingectomy    Diagnosis: desires permenant sterilization  Diagnosis Additional Information: No value filed.    Anesthesia Type:   General, ETT     Note:  Airway :Face Mask  Patient transferred to:PACU  Handoff Report: Identifed the Patient, Identified the Reponsible Provider, Reviewed the pertinent medical history, Discussed the surgical course, Reviewed Intra-OP anesthesia mangement and issues during anesthesia, Set expectations for post-procedure period and Allowed opportunity for questions and acknowledgement of understanding      Vitals: (Last set prior to Anesthesia Care Transfer)    CRNA VITALS  9/4/2019 1342 - 9/4/2019 1416      9/4/2019             NIBP:  97/67    NIBP Mean:  76                Electronically Signed By: HARI Antonoi CRNA  September 4, 2019  2:16 PM

## 2019-09-04 NOTE — OR NURSING
Pt states quit smoking on Aug 1st of 2019, reviewed guidelines with dr Schmitt about EKG, none needed for today per v.o. DR Schmitt

## 2019-09-04 NOTE — BRIEF OP NOTE
[facility]    Brief Operative Note    Pre-operative diagnosis: desires permenant sterilization  Post-operative diagnosis 52 y/o female with multiparity desires permanent sterilization s/p laparoscopic bilateral salpingectomy   Procedure: Procedure(s):  Laparoscopic bilateral salpingectomy  Surgeon: Surgeon(s) and Role:     * Melinda Whitten DO - Primary  Anesthesia: General   Estimated blood loss: 5 ml  Drains: None  Specimens:   ID Type Source Tests Collected by Time Destination   A :  Tissue Fallopian Tube, Bilateral SURGICAL PATHOLOGY EXAM Melinda Whitten DO 9/4/2019  1:59 PM      Findings:   normal uterus with bladder changes c/w previous cearean section, bilateral normal ovaries and tubes.  Complications: None.  Implants:  * No implants in log *

## 2019-09-04 NOTE — ANESTHESIA PREPROCEDURE EVALUATION
Anesthesia Pre-Procedure Evaluation    Patient: Evangelina De Anda   MRN: [id number] : 1965          Preoperative Diagnosis: desires permenant sterilization    Procedure(s):  Laparoscopic bilateral salpingectomy    Past Medical History:   Diagnosis Date     Alcohol abuse, in remission      Endometrial cells on cervical Pap smear inconsistent w/LMP 2012    endo bx WNL     MAYRA II (vulvar intraepithelial neoplasia II) 2012     Past Surgical History:   Procedure Laterality Date     APPENDECTOMY      age 6 yrs.     COLONOSCOPY  2015    Dr. Estella LAROSE     EXCISE VULVA WIDE LOCAL  7/15/2014    Procedure: EXCISE VULVA WIDE LOCAL;  Surgeon: Melinda Whitten DO;  Location: [facility] OR     GYN SURGERY           SURGICAL HISTORY OF -       C section     TONSILLECTOMY      T&A as a child     Anesthesia Evaluation     . Pt has had prior anesthetic.     No history of anesthetic complications          ROS/MED HX    ENT/Pulmonary:  - neg pulmonary ROS   (+)tobacco use, Past use 5-10 cigs/week packs/day  , . .    Neurologic:  - neg neurologic ROS    (-) seizures and Neuropathy   Cardiovascular:  - neg cardiovascular ROS   (+) ----. : . . . :. . Previous cardiac testing Echodate:results:Stress Testdate:2015 results:  Exercise was stopped due to fatigue.  The patient exhibited no chest pain during exercise.  There was no chest pain or significant ST changes with exercise.  A moderate workload was achieved.  Normal blood pressure response to exercise.  Target Heart Rate was achieved.  There was no chest pain or significant ST changes with exercise.  Arrhythmia noted in recovery: occasional PAC's.  This was a normal stress echocardiogram.  The visual ejection fraction is estimated at 65-70%.  Left ventricular cavity size decreases with exercise.  Global LV systolic function augments with exercise. date: results: date: results:         (-) CAD, syncope and arrhythmias   METS/Exercise Tolerance:  >4 METS  "  Hematologic:  - neg hematologic  ROS       Musculoskeletal:  - neg musculoskeletal ROS       GI/Hepatic:  - neg GI/hepatic ROS      (-) GERD   Renal/Genitourinary:     (+) chronic renal disease, type: CRI, Pt does not require dialysis, Pt has no history of transplant,       Endo:  - neg endo ROS       Psychiatric: Comment: H/O ETOH abuse, remission - neg psychiatric ROS       Infectious Disease:  - neg infectious disease ROS       Malignancy:         Other:                          Physical Exam  Normal systems: cardiovascular, pulmonary and dental    Airway   Mallampati: I  TM distance: >3 FB  Neck ROM: full    Dental     Cardiovascular       Pulmonary             Lab Results   Component Value Date    WBC 7.1 08/29/2019    HGB 13.5 08/29/2019    HCT 40.0 08/29/2019     08/29/2019     08/29/2019    POTASSIUM 4.6 08/29/2019    CHLORIDE 110 (H) 08/29/2019    CO2 25 08/29/2019    BUN 18 08/29/2019    CR 1.10 (H) 08/29/2019    GLC 90 08/29/2019    MARCELO 9.0 08/29/2019    ALBUMIN 3.9 05/15/2019    PROTTOTAL 7.0 05/15/2019    ALT 27 05/15/2019    AST 19 05/15/2019    ALKPHOS 65 05/15/2019    BILITOTAL 0.6 05/15/2019    LIPASE 299 05/15/2019    TSH 1.69 03/18/2017    HCG Negative 05/15/2019       Preop Vitals  BP Readings from Last 3 Encounters:   09/04/19 105/66   08/29/19 120/60   07/22/19 110/70    Pulse Readings from Last 3 Encounters:   09/04/19 53   08/29/19 60   05/16/19 68      Resp Readings from Last 3 Encounters:   09/04/19 18   08/29/19 17   05/16/19 16    SpO2 Readings from Last 3 Encounters:   09/04/19 98%   08/29/19 98%   05/16/19 98%      Temp Readings from Last 1 Encounters:   09/04/19 99.7  F (37.6  C) (Temporal)    Ht Readings from Last 1 Encounters:   08/29/19 1.676 m (5' 6\")      Wt Readings from Last 1 Encounters:   08/29/19 60.4 kg (133 lb 1.6 oz)    Estimated body mass index is 21.48 kg/m  as calculated from the following:    Height as of 8/29/19: 1.676 m (5' 6\").    Weight as of " 8/29/19: 60.4 kg (133 lb 1.6 oz).       Anesthesia Plan      History & Physical Review  History and physical reviewed and following examination; no interval change.    ASA Status:  2 .    NPO Status:  > 8 hours    Plan for General and ETT with Intravenous induction. Maintenance will be Inhalation.    PONV prophylaxis:  Ondansetron (or other 5HT-3) and Dexamethasone or Solumedrol       Postoperative Care  Postoperative pain management:  IV analgesics, Oral pain medications and Multi-modal analgesia.      Consents  Anesthetic plan, risks, benefits and alternatives discussed with:  Patient..                 Feliciano Walker MD                    .

## 2019-09-04 NOTE — DISCHARGE INSTRUCTIONS
Follow up in 1-2 weeks   Call [phone] for concerns    You may also directly page me by texting [phone] if concerns arise. Text me your name and number, and if I am available I will call back, if you do not hear from me in 15 minutes page the above number listed for answering service @ [phone]      Call and ask to be seen or talk to a doctor for the following (You can go to the ob triage button on answering service line [phone]):        Increased bleeding, fever, general unwell feeling or increased pain.   Please call for any reason or concern!     Dr. Melinda Whitten, DO    OB/GYN   [facility] [place]      GENERAL ANESTHESIA OR SEDATION ADULT DISCHARGE INSTRUCTIONS   SPECIAL PRECAUTIONS FOR 24 HOURS AFTER SURGERY    IT IS NOT UNUSUAL TO FEEL LIGHT-HEADED OR FAINT, UP TO 24 HOURS AFTER SURGERY OR WHILE TAKING PAIN MEDICATION.  IF YOU HAVE THESE SYMPTOMS; SIT FOR A FEW MINUTES BEFORE STANDING AND HAVE SOMEONE ASSIST YOU WHEN YOU GET UP TO WALK OR USE THE BATHROOM.    YOU SHOULD REST AND RELAX FOR THE NEXT 24 HOURS AND YOU MUST MAKE ARRANGEMENTS TO HAVE SOMEONE STAY WITH YOU FOR AT LEAST 24 HOURS AFTER YOUR DISCHARGE.  AVOID HAZARDOUS AND STRENUOUS ACTIVITIES.  DO NOT MAKE IMPORTANT DECISIONS FOR 24 HOURS.    DO NOT DRIVE ANY VEHICLE OR OPERATE MECHANICAL EQUIPMENT FOR 24 HOURS FOLLOWING THE END OF YOUR SURGERY.  EVEN THOUGH YOU MAY FEEL NORMAL, YOUR REACTIONS MAY BE AFFECTED BY THE MEDICATION YOU HAVE RECEIVED.    DO NOT DRINK ALCOHOLIC BEVERAGES FOR 24 HOURS FOLLOWING YOUR SURGERY.    DRINK CLEAR LIQUIDS (APPLE JUICE, GINGER ALE, 7-UP, BROTH, ETC.).  PROGRESS TO YOUR REGULAR DIET AS YOU FEEL ABLE.    YOU MAY HAVE A DRY MOUTH, A SORE THROAT, MUSCLES ACHES OR TROUBLE SLEEPING.  THESE SHOULD GO AWAY AFTER 24 HOURS.    CALL YOUR DOCTOR FOR ANY OF THE FOLLOWING:  SIGNS OF INFECTION (FEVER, GROWING TENDERNESS AT THE SURGERY SITE, A LARGE AMOUNT OF DRAINAGE OR BLEEDING,  SEVERE PAIN, FOUL-SMELLING DRAINAGE, REDNESS OR SWELLING.    IT HAS BEEN OVER 8 TO 10 HOURS SINCE SURGERY AND YOU ARE STILL NOT ABLE TO URINATE (PASS WATER).   DR. TIA MORE M.D.   CLINIC PHONE NUMBER:  [phone].  [facility]      HOME CARE FOLLOWING MINOR SURGERY      DRESSING  Keep dressing dry and in place until your doctor instructs you to remove the dressing.    DRAINAGE  There should be minimal drainage. If bleeding should occur and soaks through the dressing apply a sterile, dry dressing over it and tape in place. If bleeding persists, apply gentle, steady pressure with your hand over the dressing for 5 minutes. If the bleeding does not stop, call your doctor.    SKIN CLOSURE  You may have stitches or special skin closures. You will be given an appointment for the removal of any external stitches. You may have stitches under the skin which will absorb. You may have steri-strips over the incision. These look like thin tapes and will peel off in 5-7 days. If they don t after 7 days, you may carefully remove them. You may shower with the steri-strips but do not soak them, as with swimming or taking a bath. A protective covering of plastic may be placed over external stitches for showering.    NOTIFY YOUR PHYSICIAN IF YOU HAVE ANY OF THE FOLLOWING SYMPTOMS:    1. Fever greater than 101 degrees  2. Excessive bleeding or drainage  3. Disruption of the skin closure  4. Swelling, redness or excessive tenderness at the site  5. Severe pain  6. Drainage that is green, yellow, thick white or has a bad odor    Maximum acetaminophen (Tylenol) dose from all sources should not exceed 4 grams (4000 mg) per day.  You've been prescribed Norco for pain. It contains hydrocodone and 325mg of tylenol.    You received Tylenol 975mg @ 11:30AM    You received Toradol, an IV form of ibuprofen (Motrin) at 1:30PM.  Do not take any ibuprofen products until 11:30PM.

## 2019-09-04 NOTE — OR NURSING
Discharge instructions given and patient and significant other verbalized understanding.  Vital signs stable.  Denies pain.  Incisions clean, dry and intact.

## 2019-09-05 LAB — COPATH REPORT: NORMAL

## 2019-09-12 ENCOUNTER — OFFICE VISIT (OUTPATIENT)
Dept: OBGYN | Facility: CLINIC | Age: 54
End: 2019-09-12
Payer: COMMERCIAL

## 2019-09-12 VITALS
SYSTOLIC BLOOD PRESSURE: 110 MMHG | TEMPERATURE: 98.8 F | HEIGHT: 66 IN | WEIGHT: 134 LBS | DIASTOLIC BLOOD PRESSURE: 64 MMHG | BODY MASS INDEX: 21.53 KG/M2

## 2019-09-12 DIAGNOSIS — Z98.890 POSTOPERATIVE STATE: Primary | ICD-10-CM

## 2019-09-12 PROCEDURE — 99024 POSTOP FOLLOW-UP VISIT: CPT | Performed by: FAMILY MEDICINE

## 2019-09-12 ASSESSMENT — MIFFLIN-ST. JEOR: SCORE: 1229.57

## 2019-09-12 NOTE — PROGRESS NOTES
Subjective: 53 year old female   status post Laparoscopic bilateral salpingectomy on 9/4/19, here for incision check.  Doing well, denies fever, significant pain.    Is not taking pain medications.   States some light vaginal bleeding.      Objective:  EXAM:  There were no vitals taken for this visit.  Constitutional: healthy, alert and no distress  Gastrointestinal: Abdomen soft, non-tender. laparoscopic incisions intact, no erthema, drainage.    Pathology:   Fallopian tubes, bilateral, bilateral salpingectomy: Complete   cross-sections of two benign fallopian tubes   identified.     Assessment/Plan: 53 year old female @OB@ Laparoscopic bilateral salpingectomy on 9/4/19,  V67.00C Surgery Follow-Up Examination  (primary encounter diagnosis)  Comment:    Plan:   Return yearly for physical exams   Ok to resume activity     Dr. Melinda Whitten, DO    Obstetrics and Gynecology  [facility] [place]

## 2019-09-12 NOTE — NURSING NOTE
"Chief Complaint   Patient presents with     Surgical Followup     tender to the touch--getting headaches       Initial /64 (BP Location: Left arm, Patient Position: Sitting, Cuff Size: Adult Regular)   Temp 98.8  F (37.1  C) (Oral)   Ht 1.676 m (5' 6\")   Wt 60.8 kg (134 lb)   BMI 21.63 kg/m   Estimated body mass index is 21.63 kg/m  as calculated from the following:    Height as of this encounter: 1.676 m (5' 6\").    Weight as of this encounter: 60.8 kg (134 lb).  BP completed using cuff size: regular    Questioned patient about current smoking habits.  Pt. quit smoking some time ago.          The following HM Due: NONE    "

## 2019-09-25 ENCOUNTER — NURSE TRIAGE (OUTPATIENT)
Dept: FAMILY MEDICINE | Facility: CLINIC | Age: 54
End: 2019-09-25

## 2019-09-25 NOTE — TELEPHONE ENCOUNTER
Additional Information    Negative: Difficult to awaken or acting confused (e.g., disoriented, slurred speech)    Negative: Weakness of the face, arm or leg on one side of the body and new onset    Negative: Numbness of the face, arm or leg on one side of the body and new onset    Negative: Loss of speech or garbled speech and new onset    Negative: Passed out (i.e., fainted, collapsed and was not responding)    Negative: Sounds like a life-threatening emergency to the triager    Negative: Followed a head injury within last 3 days    Negative: Traumatic Brain Injury (TBI) is suspected    Negative: Sinus pain of forehead and yellow or green nasal discharge    Negative: Pregnant    Negative: Unable to walk without falling    Negative: Stiff neck (can't touch chin to chest)    Negative: Possibility of carbon monoxide exposure    Negative: SEVERE headache, states 'worst headache' of life    Negative: SEVERE headache, sudden onset (i.e., reaching maximum intensity within 30 seconds)    Negative: Severe pain in one eye    Negative: Loss of vision or double vision    Negative: Patient sounds very sick or weak to the triager    Negative: Fever > 103 F (39.4 C)    Negative: Fever > 100.0 F (37.8 C) and has diabetes mellitus or a weak immune system (e.g., HIV positive, cancer chemotherapy, organ transplant, splenectomy, chronic steroids)    Negative: SEVERE headache (e.g., excruciating) and has had severe headaches before    Negative: SEVERE headache and not relieved by pain meds    Negative: SEVERE headache and vomiting    Negative: SEVERE headache and fever    Negative: New headache and weak immune system (e.g., HIV positive, cancer chemotherapy, chronic steroid treatment)    Negative: Fever present > 3 days (72 hours)    Negative: Patient wants to be seen    Negative: Unexplained headache that is present > 24 hours    Negative: New headache and age > 50    Negative: Headache started during sex    Negative: Headache is  "a chronic symptom (recurrent or ongoing AND lasting > 4 weeks)    Mild-moderate headache    Answer Assessment - Initial Assessment Questions  1. LOCATION: \"Where does it hurt?\"       Mostly my whole head- occasional \"sharp pain\" on left side lasting seconds   2. ONSET: \"When did the headache start?\" (Minutes, hours or days)       2-3 weeks   3. PATTERN: \"Does the pain come and go, or has it been constant since it started?\"      Comes and goes- does wake her at night   4. SEVERITY: \"How bad is the pain?\" and \"What does it keep you from doing?\"  (e.g., Scale 1-10; mild, moderate, or severe)    - MILD (1-3): doesn't interfere with normal activities     - MODERATE (4-7): interferes with normal activities or awakens from sleep     - SEVERE (8-10): excruciating pain, unable to do any normal activities         4/10 \"annoying\" lasting hours   Sharp pain 8/10 but only last seconds   5. RECURRENT SYMPTOM: \"Have you ever had headaches before?\" If so, ask: \"When was the last time?\" and \"What happened that time?\"       No history of HA   6. CAUSE: \"What do you think is causing the headache?\"      Unsure but recent surgery   7. MIGRAINE: \"Have you been diagnosed with migraine headaches?\" If so, ask: \"Is this headache similar?\"       No   8. HEAD INJURY: \"Has there been any recent injury to the head?\"       No  9. OTHER SYMPTOMS: \"Do you have any other symptoms?\" (fever, stiff neck, eye pain, sore throat, cold symptoms)      Denies N/V no URI symptoms but does have allergies but they are not bad   10. PREGNANCY: \"Is there any chance you are pregnant?\" \"When was your last menstrual period?\"        NA    Protocols used: HEADACHE-A-OH      "

## 2019-09-27 ENCOUNTER — OFFICE VISIT (OUTPATIENT)
Dept: FAMILY MEDICINE | Facility: CLINIC | Age: 54
End: 2019-09-27
Payer: COMMERCIAL

## 2019-09-27 VITALS
HEART RATE: 73 BPM | SYSTOLIC BLOOD PRESSURE: 100 MMHG | BODY MASS INDEX: 21.33 KG/M2 | DIASTOLIC BLOOD PRESSURE: 65 MMHG | RESPIRATION RATE: 16 BRPM | TEMPERATURE: 97.3 F | HEIGHT: 66 IN | OXYGEN SATURATION: 96 % | WEIGHT: 132.7 LBS

## 2019-09-27 DIAGNOSIS — G43.801 OTHER MIGRAINE WITH STATUS MIGRAINOSUS, NOT INTRACTABLE: Primary | ICD-10-CM

## 2019-09-27 PROCEDURE — 99214 OFFICE O/P EST MOD 30 MIN: CPT | Performed by: PHYSICIAN ASSISTANT

## 2019-09-27 RX ORDER — SUMATRIPTAN 50 MG/1
50 TABLET, FILM COATED ORAL
Qty: 30 TABLET | Refills: 0 | Status: SHIPPED | OUTPATIENT
Start: 2019-09-27 | End: 2019-12-05

## 2019-09-27 ASSESSMENT — MIFFLIN-ST. JEOR: SCORE: 1223.67

## 2019-09-27 NOTE — PATIENT INSTRUCTIONS
(G49.801) Other migraine with status migrainosus, not intractable  (primary encounter diagnosis)  Comment:   Plan: NEUROLOGY ADULT REFERRAL, SUMAtriptan (IMITREX)        50 MG tablet              Patient Education     Migraine Headache  This often severe type of headache is different from other types of headaches in that symptoms other than pain occur with the headache. Nausea and vomiting, lightheadedness, sensitivity to light (photophobia), and other visual disturbances are common migraine symptoms. The pain may last from a few hours to several days. It is not clear why migraines occur but certain factors called  triggers  can raise the risk of having a migraine attack. A migraine may be triggered by emotional stress or depression, or by hormone changes during the menstrual cycle. Other triggers include birth control pills, overuse of migraine medicines, alcohol or caffeine, foods with tyramine (such as aged cheese and wine), eyestrain, weather changes, missed meals, or too little or too much sleep.  Home care  Follow these tips when taking care of yourself at home:    Don t drive yourself home if you were given pain medicine for your headache or are having visual symptoms. Instead, have someone else drive you home. Try to sleep when you get home. You should feel much better when you wake up.    Cold can help ease migraine symptoms. Put an ice pack on your forehead or at the base of your skull. Put heat on the back of your neck to help ease any neck spasm.    Drink only clear liquids or eat a light diet until your symptoms get better. This will help you avoid nausea and vomiting.  How to prevent migraines  Pay attention to what seems to trigger your headache. Try to avoid the triggers when you can. If you have frequent headaches, consider keeping a headache diary. In it, write down what you were doing, feeling, or eating in the hours before each headache. Show this to your healthcare provider to help find the  cause of your headaches.  If stress seems to be a trigger for your headaches, figure out what is causing stress in your life. Learn new ways to handle your stress. Ideas include regular exercise, biofeedback, self-hypnosis, yoga, and meditation. Talk with your healthcare provider to find out more information about managing stress. Many books and digital media are also available on this subject.  Tyramine is a substance found in many foods. It can trigger a migraine in some people. These foods contain tyramine:    Chocolate    Yogurt    All cheeses, but especially aged cheeses    Smoked or pickled fish and meat, including herring, caviar, bologna, pepperoni, and salami    Liver    Avocados    Bananas    Figs    Raisins    Red wine  Try staying away from these foods for 1 to 2 months to see if you have fewer headaches.  How to treat future headaches    Take time out at the first sign of a headache, if possible. Find a quiet, dark, comfortable place to sit or lie down. Let yourself relax or sleep.    Put an ice pack on your forehead or on the area of greatest pain. A heating pad and massage may help if you are having a muscle spasm and tightness in your neck.    If you have been prescribed a medicine to stop a migraine headache, use this at the first warning sign of the headache for best results. First signs may be an aura or pain.    If you need to take medicine often for your migraine, talk with your healthcare provider about other ways to prevent your headaches.  Follow-up care  Follow up with your healthcare provider, or as advised. Talk with your provider if you have frequent headaches. He or she can figure out a treatment plan. Ask if you can have medicine to take at home the next time you get a bad headache. This may keep you from having to visit the emergency department in the future. You may need to see a headache specialist (neurologist) if you continue to have headaches.  When to seek medical advice  Call  your healthcare provider right away if any of these occur:    Your head pain gets worse, or doesn t get better within 24 hours    You can t keep liquids down (repeated vomiting)    Pain in your sinuses, ears, or throat    Fever of 100.4  F (38  C) or higher, or as directed by your healthcare provider    Stiff neck    Extreme drowsiness, confusion, or fainting    Dizziness, or dizziness with spinning sensation (vertigo)    Weakness in an arm or leg, or on one side of your face    Difficulty talking or seeing  Date Last Reviewed: 8/1/2016 2000-2018 [employer]. [address] [place] [id number]. All rights reserved. This information is not intended as a substitute for professional medical care. Always follow your healthcare professional's instructions.           Patient Education     Migraine Headache  This often severe type of headache is different from other types of headaches in that symptoms other than pain occur with the headache. Nausea and vomiting, lightheadedness, sensitivity to light (photophobia), and other visual disturbances are common migraine symptoms. The pain may last from a few hours to several days. It is not clear why migraines occur but certain factors called  triggers  can raise the risk of having a migraine attack. A migraine may be triggered by emotional stress or depression, or by hormone changes during the menstrual cycle. Other triggers include birth control pills, overuse of migraine medicines, alcohol or caffeine, foods with tyramine (such as aged cheese and wine), eyestrain, weather changes, missed meals, or too little or too much sleep.  Home care  Follow these tips when taking care of yourself at home:    Don t drive yourself home if you were given pain medicine for your headache or are having visual symptoms. Instead, have someone else drive you home. Try to sleep when you get home. You should feel much better when you wake up.    Cold can help ease migraine  symptoms. Put an ice pack on your forehead or at the base of your skull. Put heat on the back of your neck to help ease any neck spasm.    Drink only clear liquids or eat a light diet until your symptoms get better. This will help you avoid nausea and vomiting.  How to prevent migraines  Pay attention to what seems to trigger your headache. Try to avoid the triggers when you can. If you have frequent headaches, consider keeping a headache diary. In it, write down what you were doing, feeling, or eating in the hours before each headache. Show this to your healthcare provider to help find the cause of your headaches.  If stress seems to be a trigger for your headaches, figure out what is causing stress in your life. Learn new ways to handle your stress. Ideas include regular exercise, biofeedback, self-hypnosis, yoga, and meditation. Talk with your healthcare provider to find out more information about managing stress. Many books and digital media are also available on this subject.  Tyramine is a substance found in many foods. It can trigger a migraine in some people. These foods contain tyramine:    Chocolate    Yogurt    All cheeses, but especially aged cheeses    Smoked or pickled fish and meat, including herring, caviar, bologna, pepperoni, and salami    Liver    Avocados    Bananas    Figs    Raisins    Red wine  Try staying away from these foods for 1 to 2 months to see if you have fewer headaches.  How to treat future headaches    Take time out at the first sign of a headache, if possible. Find a quiet, dark, comfortable place to sit or lie down. Let yourself relax or sleep.    Put an ice pack on your forehead or on the area of greatest pain. A heating pad and massage may help if you are having a muscle spasm and tightness in your neck.    If you have been prescribed a medicine to stop a migraine headache, use this at the first warning sign of the headache for best results. First signs may be an aura or  pain.    If you need to take medicine often for your migraine, talk with your healthcare provider about other ways to prevent your headaches.  Follow-up care  Follow up with your healthcare provider, or as advised. Talk with your provider if you have frequent headaches. He or she can figure out a treatment plan. Ask if you can have medicine to take at home the next time you get a bad headache. This may keep you from having to visit the emergency department in the future. You may need to see a headache specialist (neurologist) if you continue to have headaches.  When to seek medical advice  Call your healthcare provider right away if any of these occur:    Your head pain gets worse, or doesn t get better within 24 hours    You can t keep liquids down (repeated vomiting)    Pain in your sinuses, ears, or throat    Fever of 100.4  F (38  C) or higher, or as directed by your healthcare provider    Stiff neck    Extreme drowsiness, confusion, or fainting    Dizziness, or dizziness with spinning sensation (vertigo)    Weakness in an arm or leg, or on one side of your face    Difficulty talking or seeing  Date Last Reviewed: 8/1/2016 2000-2018 [employer]. [address] [place] [id number]. All rights reserved. This information is not intended as a substitute for professional medical care. Always follow your healthcare professional's instructions.

## 2019-09-27 NOTE — PROGRESS NOTES
"Subjective     Evangelina De Anda is a 53 year old female who presents to clinic today for the following health issues:    HPI   Concern - Headaches  Onset: 3 weeks    Description:   Pt says she had recently tubes tied and started having headaches since then.    Intensity: moderate, annoying,sharp    Progression of Symptoms:  same    Accompanying Signs & Symptoms:  none    Previous history of similar problem:   none    Precipitating factors:   Worsened by: none not noticed any    Alleviating factors:  Improved by: none    Therapies Tried and outcome: ibuprofen.      Current Outpatient Medications   Medication Sig Dispense Refill     SUMAtriptan (IMITREX) 50 MG tablet Take 1 tablet (50 mg) by mouth at onset of headache for migraine May repeat in 2 hours. Max 4 tablets/24 hours. 30 tablet 0     clobetasol (TEMOVATE) 0.05 % external cream Apply topically three times a week (Patient not taking: Reported on 9/27/2019) 70 g 3     ibuprofen (ADVIL,MOTRIN) 800 MG tablet Take 1 tablet (800 mg) by mouth every 8 hours as needed for moderate pain (Patient not taking: Reported on 7/22/2019) 90 tablet 1     BP Readings from Last 3 Encounters:   09/27/19 100/65   09/12/19 110/64   09/04/19 126/82    Wt Readings from Last 3 Encounters:   09/27/19 60.2 kg (132 lb 11.2 oz)   09/12/19 60.8 kg (134 lb)   08/29/19 60.4 kg (133 lb 1.6 oz)                      Reviewed and updated as needed this visit by Provider         Review of Systems   ROS COMP: Constitutional, HEENT, cardiovascular, pulmonary, gi and gu systems are negative, except as otherwise noted.      Objective    /65 (BP Location: Right arm, Patient Position: Chair, Cuff Size: Adult Regular)   Pulse 73   Temp 97.3  F (36.3  C) (Oral)   Resp 16   Ht 1.676 m (5' 6\")   Wt 60.2 kg (132 lb 11.2 oz)   LMP 08/28/2019 (Approximate)   SpO2 96%   BMI 21.42 kg/m    Body mass index is 21.42 kg/m .  Physical Exam   GENERAL: healthy, alert and no distress  HENT: ear canals and " TM's normal, nose and mouth without ulcers or lesions  RESP: lungs clear to auscultation - no rales, rhonchi or wheezes  CV: regular rate and rhythm, normal S1 S2, no S3 or S4, no murmur, click or rub, no peripheral edema and peripheral pulses strong  NEURO: Normal strength and tone, sensory exam grossly normal, mentation intact and cranial nerves 2-12 intact    Diagnostic Test Results:  Labs reviewed in Epic        Assessment & Plan     1. Other migraine with status migrainosus, not intractable  Most likely hormonal.  Advised imitrex Risks, benefits, side effects and intended purposes discussed.    Follow-up with neurology   - NEUROLOGY ADULT REFERRAL  - SUMAtriptan (IMITREX) 50 MG tablet; Take 1 tablet (50 mg) by mouth at onset of headache for migraine May repeat in 2 hours. Max 4 tablets/24 hours.  Dispense: 30 tablet; Refill: 0     Tobacco Cessation:   reports that she has been smoking cigarettes. She has been smoking about 0.00 packs per day. She has never used smokeless tobacco.  Tobacco Cessation Action Plan: Information offered: Patient not interested at this time            No follow-ups on file.    Ramona Ann Aaseby-Aguilera, PA-C  [facility]

## 2019-10-01 ENCOUNTER — HEALTH MAINTENANCE LETTER (OUTPATIENT)
Age: 54
End: 2019-10-01

## 2019-12-05 ENCOUNTER — OFFICE VISIT (OUTPATIENT)
Dept: FAMILY MEDICINE | Facility: CLINIC | Age: 54
End: 2019-12-05
Payer: COMMERCIAL

## 2019-12-05 VITALS
OXYGEN SATURATION: 98 % | WEIGHT: 132 LBS | SYSTOLIC BLOOD PRESSURE: 100 MMHG | HEART RATE: 61 BPM | TEMPERATURE: 98.5 F | DIASTOLIC BLOOD PRESSURE: 58 MMHG | BODY MASS INDEX: 21.31 KG/M2

## 2019-12-05 DIAGNOSIS — L98.9 SKIN LESION: ICD-10-CM

## 2019-12-05 DIAGNOSIS — L82.0 INFLAMED SEBORRHEIC KERATOSIS: Primary | ICD-10-CM

## 2019-12-05 PROCEDURE — 17110 DESTRUCTION B9 LES UP TO 14: CPT | Performed by: FAMILY MEDICINE

## 2019-12-05 NOTE — PROGRESS NOTES
Subjective     Evangelina De Anda is a 53 year old female who presents to clinic today for the following health issues:    HPI   Skin lesion    Right forearm - noticed past couple months, getting bigger, no cracking or bleeding. Had similar 1.5 years ago, was frozen off. History of sun exposure at younger age.     Left upper chest - noticed past couple weeks, no change, red, no bleeding. Had similar 1.5 years ago, was frozen off. History of sun exposure at younger age.    Therapies Tried and outcome: none      Patient Active Problem List   Diagnosis     Tobacco use disorder     Adjustment disorder with mixed anxiety and depressed mood     Family history of colon cancer     Family history of coronary artery disease     Tubular adenoma     Anxiety     Unexplained endometrial cells on cervical Pap smear     MAYRA II (vulvar intraepithelial neoplasia II)     Lateral epicondylitis of left elbow     Past Surgical History:   Procedure Laterality Date     APPENDECTOMY      age 6 yrs.     COLONOSCOPY  2015    Dr. Estella LAROSE     EXCISE VULVA WIDE LOCAL  7/15/2014    Procedure: EXCISE VULVA WIDE LOCAL;  Surgeon: Melinda Whitten DO;  Location:  OR     GYN SURGERY           LAPAROSCOPIC SALPINGECTOMY Bilateral 2019    Procedure: Laparoscopic bilateral salpingectomy;  Surgeon: Melinda Whitten DO;  Location: [facility] OR     SURGICAL HISTORY OF -       C section     TONSILLECTOMY      T&A as a child     TUBAL LIGATION  2019       Social History     Tobacco Use     Smoking status: Former Smoker     Packs/day: 0.00     Types: Cigarettes     Last attempt to quit: 2019     Years since quittin.3     Smokeless tobacco: Never Used     Tobacco comment: 1 cig/day    Substance Use Topics     Alcohol use: No     Alcohol/week: 0.0 standard drinks     Comment: stopped 2014     Family History   Problem Relation Age of Onset     C.A.D. Father 65        heart attack and quadruble bypass     Cancer  Father         spleen     Heart Disease Father      Hypertension Paternal Grandmother      Cerebrovascular Disease Maternal Grandmother      Cancer - colorectal Mother         in her 50's.     Neurologic Disorder Mother         MS- at age of 65     Heart Disease Sister 38         from massive heart attack at age of 38     Family History Negative Brother      Family History Negative Sister            Reviewed and updated as needed this visit by Provider  Tobacco  Allergies  Meds  Problems  Med Hx  Surg Hx  Fam Hx         Review of Systems   ROS COMP: Constitutional, HEENT, cardiovascular, pulmonary, gi and gu systems are negative, except as otherwise noted.      Objective    /58 (BP Location: Right arm, Patient Position: Sitting, Cuff Size: Adult Regular)   Pulse 61   Temp 98.5  F (36.9  C) (Oral)   Wt 59.9 kg (132 lb)   LMP 05/15/2019   SpO2 98%   BMI 21.31 kg/m    Body mass index is 21.31 kg/m .  Physical Exam   GENERAL: healthy, alert and no distress  SKIN: right forearm - 1 cm raised pearly macule, slightly erythematous, no blood vessels within. Left upper chest - 0.5 cm raised erythematous with slight central whitish discoloration    Diagnostic Test Results:  Labs reviewed in Epic        Assessment & Plan     1. Inflamed seborrheic keratosis - discussed likely diagnosis for right forearm lesion today, advised cryotherapy today. If lesion does not resolve, advised biopsy.   - DESTRUCT BENIGN LESION, UP TO 14    2. Skin lesion - left upper chest - sebK versus dermatofibroma. Frozen today, advised to monitor.         Return in about 1 year (around 2020) for Yearly Physical Exam.    Gladys Raymond MD  [facility]

## 2020-01-30 ENCOUNTER — TRANSFERRED RECORDS (OUTPATIENT)
Dept: HEALTH INFORMATION MANAGEMENT | Facility: CLINIC | Age: 55
End: 2020-01-30

## 2020-02-17 ENCOUNTER — TRANSFERRED RECORDS (OUTPATIENT)
Dept: HEALTH INFORMATION MANAGEMENT | Facility: CLINIC | Age: 55
End: 2020-02-17

## 2020-02-27 ENCOUNTER — OFFICE VISIT (OUTPATIENT)
Dept: FAMILY MEDICINE | Facility: CLINIC | Age: 55
End: 2020-02-27
Payer: COMMERCIAL

## 2020-02-27 VITALS
WEIGHT: 141 LBS | TEMPERATURE: 97.6 F | OXYGEN SATURATION: 97 % | SYSTOLIC BLOOD PRESSURE: 100 MMHG | DIASTOLIC BLOOD PRESSURE: 63 MMHG | BODY MASS INDEX: 22.66 KG/M2 | HEIGHT: 66 IN | HEART RATE: 55 BPM

## 2020-02-27 DIAGNOSIS — R05.9 COUGH: ICD-10-CM

## 2020-02-27 DIAGNOSIS — Z11.4 SCREENING FOR HIV (HUMAN IMMUNODEFICIENCY VIRUS): Primary | ICD-10-CM

## 2020-02-27 DIAGNOSIS — J01.00 ACUTE MAXILLARY SINUSITIS, RECURRENCE NOT SPECIFIED: ICD-10-CM

## 2020-02-27 DIAGNOSIS — Z11.59 NEED FOR HEPATITIS C SCREENING TEST: ICD-10-CM

## 2020-02-27 PROCEDURE — 99213 OFFICE O/P EST LOW 20 MIN: CPT | Performed by: PHYSICIAN ASSISTANT

## 2020-02-27 PROCEDURE — G0472 HEP C SCREEN HIGH RISK/OTHER: HCPCS | Performed by: PHYSICIAN ASSISTANT

## 2020-02-27 PROCEDURE — 87389 HIV-1 AG W/HIV-1&-2 AB AG IA: CPT | Performed by: PHYSICIAN ASSISTANT

## 2020-02-27 PROCEDURE — 36415 COLL VENOUS BLD VENIPUNCTURE: CPT | Performed by: PHYSICIAN ASSISTANT

## 2020-02-27 RX ORDER — AMOXICILLIN 875 MG
875 TABLET ORAL 2 TIMES DAILY
Qty: 20 TABLET | Refills: 0 | Status: SHIPPED | OUTPATIENT
Start: 2020-02-27 | End: 2020-03-08

## 2020-02-27 RX ORDER — CODEINE PHOSPHATE AND GUAIFENESIN 10; 100 MG/5ML; MG/5ML
1-2 SOLUTION ORAL EVERY 4 HOURS PRN
Qty: 120 ML | Refills: 0 | Status: SHIPPED | OUTPATIENT
Start: 2020-02-27 | End: 2022-03-18

## 2020-02-27 RX ORDER — CODEINE PHOSPHATE AND GUAIFENESIN 10; 100 MG/5ML; MG/5ML
1-2 SOLUTION ORAL EVERY 4 HOURS PRN
Qty: 120 ML | Refills: 0 | Status: SHIPPED | OUTPATIENT
Start: 2020-02-27 | End: 2020-02-27

## 2020-02-27 ASSESSMENT — MIFFLIN-ST. JEOR: SCORE: 1256.32

## 2020-02-27 NOTE — PROGRESS NOTES
"Subjective     Evangelina De Anda is a 54 year old female who presents to clinic today for the following health issues:        HPI   Acute Illness   Acute illness concerns: cold   Onset: 2 wks     Fever: no    Chills/Sweats: no    Headache (location?): YES    Sinus Pressure:YES    Conjunctivitis:  Watery only    Ear Pain: YES: both    Rhinorrhea: YES    Congestion: YES    Sore Throat: YES     Cough: YES-non-productive and , productive of yellow sputum     Wheeze: YES    Decreased Appetite: YES    Nausea: no     Vomiting: no     Diarrhea:  no     Dysuria/Freq.: no     Fatigue/Achiness: YES    Sick/Strep Exposure:      Therapies Tried and outcome:         Current Outpatient Medications   Medication Sig Dispense Refill     amoxicillin (AMOXIL) 875 MG tablet Take 1 tablet (875 mg) by mouth 2 times daily for 10 days 20 tablet 0     dextromethorphan (TUSSIN COUGH) 15 MG/5ML syrup Take 10 mLs by mouth 4 times daily as needed for cough       guaiFENesin-codeine (ROBITUSSIN AC) 100-10 MG/5ML solution Take 5-10 mLs by mouth every 4 hours as needed for cough 120 mL 0     ibuprofen (ADVIL,MOTRIN) 800 MG tablet Take 1 tablet (800 mg) by mouth every 8 hours as needed for moderate pain 90 tablet 1         Reviewed and updated as needed this visit by Provider         Review of Systems   ROS COMP: Constitutional, HEENT, cardiovascular, pulmonary, gi and gu systems are negative, except as otherwise noted.      Objective    /63 (BP Location: Right arm, Patient Position: Chair, Cuff Size: Adult Large)   Pulse 55   Temp 97.6  F (36.4  C) (Oral)   Ht 1.676 m (5' 6\")   Wt 64 kg (141 lb)   SpO2 97%   BMI 22.76 kg/m    Body mass index is 22.76 kg/m .  Physical Exam   GENERAL: healthy, alert and no distress  EYES: Eyes grossly normal to inspection, PERRL and conjunctivae and sclerae normal  HENT: ear canals and TM's normal, nose and mouth without ulcers or lesions  RESP: lungs clear to auscultation - no rales, rhonchi or " wheezes  CV: regular rate and rhythm, normal S1 S2, no S3 or S4, no murmur, click or rub, no peripheral edema and peripheral pulses strong    Diagnostic Test Results:  Labs reviewed in Epic        Assessment & Plan     1. Screening for HIV (human immunodeficiency virus)    - HIV Antigen Antibody Combo    2. Need for hepatitis C screening test    - Hepatitis C Screen Reflex to HCV RNA Quant and Genotype    3. Acute maxillary sinusitis, recurrence not specified    - amoxicillin (AMOXIL) 875 MG tablet; Take 1 tablet (875 mg) by mouth 2 times daily for 10 days  Dispense: 20 tablet; Refill: 0    4. Cough    - guaiFENesin-codeine (ROBITUSSIN AC) 100-10 MG/5ML solution; Take 5-10 mLs by mouth every 4 hours as needed for cough  Dispense: 120 mL; Refill: 0       Patient Instructions   (Z11.4) Screening for HIV (human immunodeficiency virus)  (primary encounter diagnosis)  Comment:   Plan: HIV Antigen Antibody Combo            (Z11.59) Need for hepatitis C screening test  Comment:   Plan: Hepatitis C Screen Reflex to HCV RNA Quant and         Genotype            (J01.00) Acute maxillary sinusitis, recurrence not specified  Comment:   Plan: amoxicillin (AMOXIL) 875 MG tablet        The patient is advised to push fluids, rest, gargle warm salt water, use vaporizer or mist needed , use acetaminophen, ibuprofen as needed and Return office visit if symptoms persist or worsen.    neti pot         No follow-ups on file.    Ramona Ann Aaseby-Aguilera, PA-C  [facility]

## 2020-02-27 NOTE — PATIENT INSTRUCTIONS
(Z11.4) Screening for HIV (human immunodeficiency virus)  (primary encounter diagnosis)  Comment:   Plan: HIV Antigen Antibody Combo            (Z11.59) Need for hepatitis C screening test  Comment:   Plan: Hepatitis C Screen Reflex to HCV RNA Quant and         Genotype            (J01.00) Acute maxillary sinusitis, recurrence not specified  Comment:   Plan: amoxicillin (AMOXIL) 875 MG tablet        The patient is advised to push fluids, rest, gargle warm salt water, use vaporizer or mist needed , use acetaminophen, ibuprofen as needed and Return office visit if symptoms persist or worsen.    neti pot

## 2020-02-28 LAB
HCV AB SERPL QL IA: NONREACTIVE
HIV 1+2 AB+HIV1 P24 AG SERPL QL IA: NONREACTIVE

## 2020-03-11 ENCOUNTER — TRANSFERRED RECORDS (OUTPATIENT)
Dept: HEALTH INFORMATION MANAGEMENT | Facility: CLINIC | Age: 55
End: 2020-03-11

## 2020-03-19 ENCOUNTER — TELEPHONE (OUTPATIENT)
Dept: FAMILY MEDICINE | Facility: CLINIC | Age: 55
End: 2020-03-19

## 2020-03-19 NOTE — TELEPHONE ENCOUNTER
Pt calls, possibly exposed to covid 19 March 8, person now positive and unknown when she was diagnosed, no symptoms for pt, informed of information for Covid 19 online resources, discussed below, agrees to monitor per lacey      Please use the information at the end of this document to sign up for  [employer] where you can get your results and a message about those results sent to you through the [employer] application. If you do not have mychart we will call you with your results but it may take longer.    Regardless of if you have been tested or not:  Patient who have symptoms (cough, fever, or shortness of breath), need to isolate for 7 days from when symptoms started OR 72 hours after fever resolves (without fever reducing medications) AND improvement of respiratory symptoms (whichever is longer).      Isolate yourself at home (in own room/own bathroom if possible)    Do Not allow any visitors    Do Not go to work or school    Do Not go to [place],  centers, shopping, or other public places.    Do Not shake hands.    Avoid close and intimate contact with others (hugging, kissing).    Follow CDC recommendations for household cleaning of frequently touched services.     After the initial 7 days, continue to isolate yourself from household members as much as possible. To continue decrease the risk of community spread and exposure, you and any members of your household should limit activities in public for 14 days after starting home isolation.     You can reference the following CDC link for helpful home isolation/care tips:  https://www.cdc.gov/coronavirus/2019-ncov/downloads/10Things.pdf    Protect Others:    Cover Your Mouth and Nose with a mask, disposable tissue or wash cloth to avoid spreading germs to others.    Wash your hands and face frequently with soap and water    Call Back If: Breathing difficulty develops or you become worse.    For more information about COVID19 and options  for caring for yourself at home, please visit the CDC website at https://www.cdc.gov/coronavirus/2019-ncov/about/steps-when-sick.html  For more options for care at [facility], please visit our website at [email or web address]  Melinda Martinez RN, BSN  Message handled by Nurse Triage.

## 2020-03-27 ENCOUNTER — NURSE TRIAGE (OUTPATIENT)
Dept: FAMILY MEDICINE | Facility: CLINIC | Age: 55
End: 2020-03-27

## 2020-03-27 NOTE — TELEPHONE ENCOUNTER
"Patient calls, has had cough since mid February. Has tightness in chest, but says she has no SOB. Tightness increases when laying down, she reports it \"feels like I have feathers in my throat.\" She reports feeling a \"twinge\" in her left shoulder once or twice an hour. This will happen with no apparent cause, she can be active or at rest. Patient has been using prilosec for the past few days.      Throat feels dry and scratchy, and frequently feels need to clear throat, but nothing comes up. This has been going on since mid-March.     Would phone visit or evisit be appropriate? Or does she need to be seen in office? Please advise.     Additional Information    Negative: Breathing stopped and hasn't returned    Negative: Choking on something    Negative: SEVERE difficulty breathing (e.g., struggling for each breath, speaks in single words, pulse > 120)    Negative: Bluish (or gray) lips or face    Negative: Difficult to awaken or acting confused (e.g., disoriented, slurred speech)    Negative: Passed out (i.e., fainted, collapsed and was not responding)    Negative: Wheezing started suddenly after medicine, an allergic food, or bee sting    Negative: Stridor    Negative: Slow, shallow and weak breathing    Negative: Sounds like a life-threatening emergency to the triager    Negative: Chest pain    Negative: Wheezing (high pitched whistling sound) and previous asthma attacks or use of asthma medicines    Negative: Difficulty breathing and only present when coughing    Negative: Difficulty breathing and only from stuffy or runny nose    Negative: MODERATE difficulty breathing (e.g., speaks in phrases, SOB even at rest, pulse 100-120) of new onset or worse than normal    Negative: Wheezing can be heard across the room    Negative: Drooling or spitting out saliva (because can't swallow)    Negative: Any history of prior \"blood clot\" in leg or lungs    Negative: Recent illness requiring prolonged bedrest (i.e., " "immobilization)    Negative: Hip or leg fracture in past 2 months (e.g., or had cast on leg or ankle)    Negative: Major surgery in the past month    Negative: Recent long-distance travel with prolonged time in car, bus, plane, or train (i.e., within past 2 weeks; 6 or  more hours duration)    Negative: Extra heart beats OR irregular heart beating   (i.e., \"palpitations\")    Negative: Fever > 103 F (39.4 C)    Negative: Fever > 101 F (38.3 C) and over 60 years of age    Negative: Fever > 100.0 F (37.8 C) and bedridden (e.g., nursing home patient, stroke, chronic illness, recovering from surgery)    Negative: Fever > 100.0 F (37.8 C) and diabetes mellitus or weak immune system (e.g., HIV positive, cancer chemo, splenectomy, organ transplant, chronic steroids)    Negative: Periods where breathing stops and then resumes normally and bedridden (e.g., nursing home patient, CVA)    Negative: Pregnant or postpartum (< 1 month since delivery)    Negative: Patient sounds very sick or weak to the triager    Negative: MILD difficulty breathing (e.g., minimal/no SOB at rest, SOB with walking, pulse < 100) of new onset or worse than normal    Negative: Longstanding difficulty breathing (e.g., CHF, COPD, emphysema) and worse than normal    Negative: Longstanding difficulty breathing and not responding to usual therapy    Negative: Continuous (nonstop) coughing    Negative: Patient wants to be seen    Protocols used: BREATHING DIFFICULTY-A-OH    Bianka Porter, VIOLETAN, RN, PHN      "

## 2020-03-27 NOTE — TELEPHONE ENCOUNTER
Evangelina calling. Not feeling the best.    She thinks she has heart burn, but she is not sure.    Would like a call today to discuss her symptoms.

## 2020-03-28 ENCOUNTER — TELEPHONE (OUTPATIENT)
Dept: URGENT CARE | Facility: URGENT CARE | Age: 55
End: 2020-03-28

## 2020-03-28 ENCOUNTER — OFFICE VISIT (OUTPATIENT)
Dept: URGENT CARE | Facility: URGENT CARE | Age: 55
End: 2020-03-28
Payer: COMMERCIAL

## 2020-03-28 ENCOUNTER — ANCILLARY PROCEDURE (OUTPATIENT)
Dept: GENERAL RADIOLOGY | Facility: CLINIC | Age: 55
End: 2020-03-28
Attending: FAMILY MEDICINE
Payer: COMMERCIAL

## 2020-03-28 VITALS
SYSTOLIC BLOOD PRESSURE: 111 MMHG | DIASTOLIC BLOOD PRESSURE: 71 MMHG | OXYGEN SATURATION: 97 % | HEART RATE: 74 BPM | TEMPERATURE: 97.8 F

## 2020-03-28 DIAGNOSIS — R07.89 ATYPICAL CHEST PAIN: ICD-10-CM

## 2020-03-28 DIAGNOSIS — R07.89 CHEST TIGHTNESS: Primary | ICD-10-CM

## 2020-03-28 LAB
BASOPHILS # BLD AUTO: 0 10E9/L (ref 0–0.2)
BASOPHILS NFR BLD AUTO: 0.4 %
DIFFERENTIAL METHOD BLD: NORMAL
EOSINOPHIL # BLD AUTO: 0.3 10E9/L (ref 0–0.7)
EOSINOPHIL NFR BLD AUTO: 3.9 %
ERYTHROCYTE [DISTWIDTH] IN BLOOD BY AUTOMATED COUNT: 12.6 % (ref 10–15)
ERYTHROCYTE [SEDIMENTATION RATE] IN BLOOD BY WESTERGREN METHOD: 5 MM/H (ref 0–30)
HCT VFR BLD AUTO: 43.3 % (ref 35–47)
HGB BLD-MCNC: 14.8 G/DL (ref 11.7–15.7)
LYMPHOCYTES # BLD AUTO: 2.6 10E9/L (ref 0.8–5.3)
LYMPHOCYTES NFR BLD AUTO: 32.2 %
MCH RBC QN AUTO: 30.1 PG (ref 26.5–33)
MCHC RBC AUTO-ENTMCNC: 34.2 G/DL (ref 31.5–36.5)
MCV RBC AUTO: 88 FL (ref 78–100)
MONOCYTES # BLD AUTO: 0.6 10E9/L (ref 0–1.3)
MONOCYTES NFR BLD AUTO: 7.4 %
NEUTROPHILS # BLD AUTO: 4.6 10E9/L (ref 1.6–8.3)
NEUTROPHILS NFR BLD AUTO: 56.1 %
PLATELET # BLD AUTO: 240 10E9/L (ref 150–450)
RBC # BLD AUTO: 4.91 10E12/L (ref 3.8–5.2)
TROPONIN I SERPL-MCNC: <0.015 UG/L (ref 0–0.04)
WBC # BLD AUTO: 8.1 10E9/L (ref 4–11)

## 2020-03-28 PROCEDURE — 93000 ELECTROCARDIOGRAM COMPLETE: CPT | Performed by: FAMILY MEDICINE

## 2020-03-28 PROCEDURE — 85652 RBC SED RATE AUTOMATED: CPT | Performed by: FAMILY MEDICINE

## 2020-03-28 PROCEDURE — 85025 COMPLETE CBC W/AUTO DIFF WBC: CPT | Performed by: FAMILY MEDICINE

## 2020-03-28 PROCEDURE — 99215 OFFICE O/P EST HI 40 MIN: CPT | Performed by: FAMILY MEDICINE

## 2020-03-28 PROCEDURE — 36415 COLL VENOUS BLD VENIPUNCTURE: CPT | Performed by: FAMILY MEDICINE

## 2020-03-28 PROCEDURE — 84484 ASSAY OF TROPONIN QUANT: CPT | Performed by: FAMILY MEDICINE

## 2020-03-28 PROCEDURE — 71046 X-RAY EXAM CHEST 2 VIEWS: CPT

## 2020-03-28 RX ORDER — OMEPRAZOLE 10 MG/1
20 CAPSULE, DELAYED RELEASE ORAL DAILY
COMMUNITY
End: 2022-03-18

## 2020-03-28 NOTE — PROGRESS NOTES
SUBJECTIVE:   Evangelina De Anda is a 54 year old female presents with chest tightness , she denies any  presenting with a chief complaint of   Chest tightness , burning it comes and goes . No exertional chest symptoms . She is a  and is exercising . Symptoms more on left side   Left throat feels in the throat , .she thinks heartburn  She is an established patient of [facility].  No coughing ,has  dryness in throat , no SOB , when laying down feels pain on the entire chest and goes to upper back   She has a strong family history of CAD   Onset of symptoms was 9 days back   Course of illness is worsening.    Severity moderate  Current and Associated symptoms: sore throat  Treatment measures tried include took prilosec.  Predisposing factors include None.  She was exposed to pt with covid almost 20 days back   She has no coughing symptoms   She has h/o smoking and quit 2 months back     Past Medical History:   Diagnosis Date     Alcohol abuse, in remission      Endometrial cells on cervical Pap smear inconsistent w/LMP 2012    endo bx WNL     MAYRA II (vulvar intraepithelial neoplasia II) 2012     Current Outpatient Medications   Medication Sig Dispense Refill     omeprazole (PRILOSEC) 10 MG DR capsule Take 20 mg by mouth daily       dextromethorphan (TUSSIN COUGH) 15 MG/5ML syrup Take 10 mLs by mouth 4 times daily as needed for cough       guaiFENesin-codeine (ROBITUSSIN AC) 100-10 MG/5ML solution Take 5-10 mLs by mouth every 4 hours as needed for cough (Patient not taking: Reported on 3/28/2020) 120 mL 0     ibuprofen (ADVIL,MOTRIN) 800 MG tablet Take 1 tablet (800 mg) by mouth every 8 hours as needed for moderate pain (Patient not taking: Reported on 3/28/2020) 90 tablet 1     Social History     Tobacco Use     Smoking status: Former Smoker     Packs/day: 0.00     Types: Cigarettes     Last attempt to quit: 2019     Years since quittin.6     Smokeless tobacco: Never Used     Tobacco comment: 1  cig/day    Substance Use Topics     Alcohol use: No     Alcohol/week: 0.0 standard drinks     Comment: stopped 2014     Family History   Problem Relation Age of Onset     C.A.D. Father 65        heart attack and quadruble bypass     Cancer Father         spleen     Heart Disease Father      Hypertension Paternal Grandmother      Cerebrovascular Disease Maternal Grandmother      Cancer - colorectal Mother         in her 50's.     Neurologic Disorder Mother         MS- at age of 65     Heart Disease Sister 38         from massive heart attack at age of 38     Family History Negative Brother      Family History Negative Sister          ROS:    10 point ROS of systems including Constitutional, Eyes, Cardiovascular, Gastroenterology, Genitourinary, Integumentary, Muscularskeletal, Psychiatric were all negative except for pertinent positives noted in my HPI         OBJECTIVE:  /71   Pulse 74   SpO2 97%   GENERAL APPEARANCE: healthy, alert and no distress  EYES: EOMI,  PERRL, conjunctiva clear  HENT: ear canals and TM's normal.  Nose and mouth without ulcers, erythema or lesions  NECK: supple, nontender, no lymphadenopathy  RESP: lungs clear to auscultation - no rales, rhonchi or wheezes  CV: regular rates and rhythm, normal S1 S2, no murmur noted  ABDOMEN:  soft, nontender, no HSM or masses and bowel sounds normal  NEURO: Normal strength and tone, sensory exam grossly normal,  normal speech and mentation  SKIN: no suspicious lesions or rashes  PSYCH: mentation appears normal  Physical Exam      X-Ray was done, my findings are:no infiltrate was noted in the chest ray    Medical Decision Making:    Differential Diagnosis:  URI Adult/Peds:  Pneumonia, Viral pharyngitis, Viral syndrome and Viral upper respiratory illness/possible covid   Cardiac: Acute MI  Pleurisy  GERD  PE  Pericarditis  Panic/Anxiety      ASSESSMENT:  Evangelina was seen today for urgent care and uri.    Diagnoses and all orders for  this visit:    Chest tightness  -     XR Chest 2 Views  -     Troponin I  -     CBC with platelets differential  -     EKG 12-lead complete w/read - Clinics  -     Erythrocyte sedimentation rate auto    Atypical chest pain        PLAN:  Tylenol, Fluids and Rest  Called pt and notified her about the result   EKG was WNL   Suggested to do tylenol for the pain , the troponin is negative   Pt was notified about the result   Follow up if  symptoms fail to improve or worsens   Pt understood and agreed with plan     See orders in Epic

## 2020-03-28 NOTE — TELEPHONE ENCOUNTER
Called and spoke to pt regarding to her appt today in . Per pt she did [facility] but the provider pulled up her chart in [facility] and there wasn't one completed. I explained to her what I see at my end. I only see calls from the 19th of March and yesterday by the RNs. Per provider in  would like pt to complete the [facility]. I told her to call the clinic when completed and have the doctor completed her chart.     YASMIN Han on 3/28/2020 at 10:04 AM

## 2020-11-23 ENCOUNTER — VIRTUAL VISIT (OUTPATIENT)
Dept: FAMILY MEDICINE | Facility: OTHER | Age: 55
End: 2020-11-23

## 2020-11-23 DIAGNOSIS — Z20.822 SUSPECTED COVID-19 VIRUS INFECTION: Primary | ICD-10-CM

## 2020-11-23 DIAGNOSIS — Z20.822 SUSPECTED COVID-19 VIRUS INFECTION: ICD-10-CM

## 2020-11-23 PROCEDURE — U0003 INFECTIOUS AGENT DETECTION BY NUCLEIC ACID (DNA OR RNA); SEVERE ACUTE RESPIRATORY SYNDROME CORONAVIRUS 2 (SARS-COV-2) (CORONAVIRUS DISEASE [COVID-19]), AMPLIFIED PROBE TECHNIQUE, MAKING USE OF HIGH THROUGHPUT TECHNOLOGIES AS DESCRIBED BY CMS-2020-01-R: HCPCS | Performed by: FAMILY MEDICINE

## 2020-11-23 NOTE — PROGRESS NOTES
"Date: 2020 07:16:05  Clinician: Pily Caba  Clinician NPI: [id number]  Patient: Evangelina De Anda  Patient : 1965  Patient Address: [address] [place] [id number]  Patient Phone: [phone]  Visit Protocol: URI  Patient Summary:  Evangelina is a 54 year old ( : 1965 ) female who initiated a [facility] Visit for COVID-19 (Coronavirus) evaluation and screening. When asked the question \"Please sign me up to receive news, health information and promotions. \", Evangelina responded \"No\".    When asked when her symptoms started, Evangelina reported that she does not have any symptoms.   She denies taking antibiotic medication in the past month and having recent facial or sinus surgery in the past 60 days.    Pertinent COVID-19 (Coronavirus) information  Evangelina does not work or volunteer as healthcare worker or a . In the past 14 days, Evangelina has not worked or volunteered at a healthcare facility or group living setting.   In the past 14 days, she also has not lived in a congregate living setting.   Evangelina has had a close contact with a laboratory-confirmed COVID-19 patient in the last 14 days. She was not exposed at her work. Date Evangelina was exposed to the laboratory-confirmed COVID-19 patient: 2020   Additional information about contact with COVID-19 (Coronavirus) patient as reported by the patient (free text): The exposed person is my boyfriend, we dont live together, but I have been with him everyday! He was not feeling good last weekend but we thought it was just a cold, nothing to severe.  He get this cold, sinus stuff every year at this time. So thats what we thought it was, No fever or cough, just congestion.  He finally talked to his  and was tested. He was tested on Thursday and results came back last night that he was Positive.   Evangelina is not living in the same household with the COVID-19 positive patient. She was in an enclosed space for greater than 15 minutes " with the COVID-19 patient.   During the encounter, neither were wearing masks.   Since December 2019, Evangelina has not been tested for COVID-19 and has not had upper respiratory infection or influenza-like illness.   Pertinent medical history  Evangelina does not get yeast infections when she takes antibiotics.   Evangelina does not need a return to work/school note.   Weight: 135 lbs   Evangelina does not smoke or use smokeless tobacco.   Weight: 135 lbs    MEDICATIONS: No current medications, ALLERGIES: NKDA  Clinician Response:  Dear Evangelina,   Based on your exposure to COVID-19 (coronavirus), we would like to test you for this virus.  1. Please call [phone] to schedule your visit. Explain that you were referred by [facility] to have a COVID-19 test. Be ready to share your [facility] visit ID number.  * If you need to schedule in [facility] please call [phone] or for [employer] employees please call [phone].   * If you need to schedule in the [place] area please call [phone]. [employer] employees call [phone].   The following will serve as your written order for this COVID Test, ordered by me, for the indication of suspected COVID [Z20.828]: The test will be ordered in [facility], our electronic health record, after you are scheduled. It will show as ordered and authorized by Ambrosio Blanchard MD.  Order: COVID-19 (coronavirus) PCR for ASYMPTOMATIC EXPOSURE testing from [facility].   If you know you have had close contact with someone who tested positive, you should be quarantined for 14 days after this exposure. You should stay in quarantine for the14 days even if the covid test is negative, the optimal time to test after exposure is 5-7 days from the exposure  Quarantine means   What should I do?  For safety, it's very important to follow these rules. Do this for 14 days after the date you were last exposed to the virus..  Stay home and away from others. Don't go to school or anywhere else. Generally quarantine means staying  home from work but there are some exceptions to this. Please contact your workplace.   No hugging, kissing or shaking hands.  Don't let anyone visit.  Cover your mouth and nose with a mask, tissue or washcloth to avoid spreading germs.  Wash your hands and face often. Use soap and water.  What are the symptoms of COVID-19?  The most common symptoms are cough, fever and trouble breathing. Less common symptoms include headache, body aches, fatigue (feeling very tired), chills, sore throat, stuffy or runny nose, diarrhea (loose poop), loss of taste or smell, belly pain, and nausea or vomiting (feeling sick to your stomach or throwing up).  After 14 days, if you have still don't have symptoms, you likely don't have this virus.  If you develop symptoms, follow these guidelines.  If you're normally healthy: Please start another [facility] visit to report your symptoms. Go to [email or web address].  If you have a serious health problem (like cancer, heart failure, an organ transplant or kidney disease): Call your specialty clinic. Let them know that you might have COVID-19.  2. When it's time for your COVID test:  Stay at least 6 feet away from others. (If someone will drive you to your test, stay in the backseat, as far away from the  as you can.)  Cover your mouth and nose with a mask, tissue or washcloth.  Go straight to the testing site. Don't make any stops on the way there or back.  Please note  Caregivers in these groups are at risk for severe illness due to COVID-19:  o People 65 years and older  o People who live in a nursing home or long-term care facility  o People with chronic disease (lung, heart, cancer, diabetes, kidney, liver, immunologic)  o People who have a weakened immune system, including those who:  Are in cancer treatment  Take medicine that weakens the immune system, such as corticosteroids  Had a bone marrow or organ transplant  Have an immune deficiency  Have poorly controlled HIV or AIDS  Are obese (body  mass index of 40 or higher)  Smoke regularly  Where can I get more information?  [facility] -- About COVID-19: [email or web address]  CDC -- What to Do If You're Sick: www.cdc.gov/coronavirus/2019-ncov/about/steps-when-sick.html  [facility] -- Ending Home Isolation: www.cdc.gov/coronavirus/2019-ncov/hcp/disposition-in-home-patients.html  [facility] -- Caring for Someone: www.cdc.gov/coronavirus/2019-ncov/if-you-are-sick/care-for-someone.html  [facility] -- Interim Guidance for Hospital Discharge to Home: www.[facility].[place]/diseases/coronavirus/hcp/hospdischarge.pdf  [facility] clinical trials (COVID-19 research studies): clinicalaffairs.[facility]-clinical-trials  Below are the COVID-19 hotlines at the [place] [facility] ([facility]). Interpreters are available.  For health questions: Call 254-801-9097 or 1-809.302.1563 (7 a.m. to 7 p.m.)  For questions about schools and childcare: Call 683-412-9666 or 1-668.384.4211 (7 a.m. to 7 p.m.)    Diagnosis: Cough  Diagnosis ICD: R05

## 2020-11-24 LAB
SARS-COV-2 RNA SPEC QL NAA+PROBE: NOT DETECTED
SPECIMEN SOURCE: NORMAL

## 2021-01-15 ENCOUNTER — HEALTH MAINTENANCE LETTER (OUTPATIENT)
Age: 56
End: 2021-01-15

## 2021-01-30 ENCOUNTER — ANCILLARY PROCEDURE (OUTPATIENT)
Dept: MAMMOGRAPHY | Facility: CLINIC | Age: 56
End: 2021-01-30
Attending: PHYSICIAN ASSISTANT
Payer: COMMERCIAL

## 2021-01-30 DIAGNOSIS — Z00.00 PREVENTATIVE HEALTH CARE: ICD-10-CM

## 2021-01-30 PROCEDURE — 77067 SCR MAMMO BI INCL CAD: CPT | Mod: TC | Performed by: RADIOLOGY

## 2021-01-30 PROCEDURE — 77063 BREAST TOMOSYNTHESIS BI: CPT | Mod: TC | Performed by: RADIOLOGY

## 2021-03-07 ENCOUNTER — HOSPITAL ENCOUNTER (EMERGENCY)
Facility: CLINIC | Age: 56
Discharge: HOME | End: 2021-03-07
Attending: EMERGENCY MEDICINE | Admitting: EMERGENCY MEDICINE
Payer: COMMERCIAL

## 2021-03-07 ENCOUNTER — APPOINTMENT (OUTPATIENT)
Dept: CT IMAGING | Facility: CLINIC | Age: 56
End: 2021-03-07
Attending: EMERGENCY MEDICINE
Payer: COMMERCIAL

## 2021-03-07 VITALS
DIASTOLIC BLOOD PRESSURE: 70 MMHG | OXYGEN SATURATION: 98 % | RESPIRATION RATE: 18 BRPM | HEART RATE: 62 BPM | TEMPERATURE: 97.9 F | SYSTOLIC BLOOD PRESSURE: 105 MMHG

## 2021-03-07 DIAGNOSIS — N23 RENAL COLIC: ICD-10-CM

## 2021-03-07 LAB
ALBUMIN UR-MCNC: 10 MG/DL
ANION GAP SERPL CALCULATED.3IONS-SCNC: 7 MMOL/L (ref 3–14)
APPEARANCE UR: CLEAR
B-HCG FREE SERPL-ACNC: <5 IU/L
BASOPHILS # BLD AUTO: 0 10E9/L (ref 0–0.2)
BASOPHILS NFR BLD AUTO: 0.7 %
BILIRUB UR QL STRIP: NEGATIVE
BUN SERPL-MCNC: 19 MG/DL (ref 7–30)
CALCIUM SERPL-MCNC: 9 MG/DL (ref 8.5–10.1)
CAOX CRY #/AREA URNS HPF: ABNORMAL /HPF
CHLORIDE SERPL-SCNC: 106 MMOL/L (ref 94–109)
CO2 SERPL-SCNC: 24 MMOL/L (ref 20–32)
COLOR UR AUTO: YELLOW
CREAT SERPL-MCNC: 0.92 MG/DL (ref 0.52–1.04)
DIFFERENTIAL METHOD BLD: NORMAL
EOSINOPHIL # BLD AUTO: 0.4 10E9/L (ref 0–0.7)
EOSINOPHIL NFR BLD AUTO: 8.1 %
ERYTHROCYTE [DISTWIDTH] IN BLOOD BY AUTOMATED COUNT: 13.5 % (ref 10–15)
GFR SERPL CREATININE-BSD FRML MDRD: 70 ML/MIN/{1.73_M2}
GLUCOSE SERPL-MCNC: 99 MG/DL (ref 70–99)
GLUCOSE UR STRIP-MCNC: NEGATIVE MG/DL
HCT VFR BLD AUTO: 42.6 % (ref 35–47)
HGB BLD-MCNC: 13.6 G/DL (ref 11.7–15.7)
HGB UR QL STRIP: ABNORMAL
HYALINE CASTS #/AREA URNS LPF: 1 /LPF (ref 0–2)
IMM GRANULOCYTES # BLD: 0 10E9/L (ref 0–0.4)
IMM GRANULOCYTES NFR BLD: 0.2 %
KETONES UR STRIP-MCNC: NEGATIVE MG/DL
LEUKOCYTE ESTERASE UR QL STRIP: NEGATIVE
LYMPHOCYTES # BLD AUTO: 2 10E9/L (ref 0.8–5.3)
LYMPHOCYTES NFR BLD AUTO: 36.5 %
MCH RBC QN AUTO: 30.2 PG (ref 26.5–33)
MCHC RBC AUTO-ENTMCNC: 31.9 G/DL (ref 31.5–36.5)
MCV RBC AUTO: 95 FL (ref 78–100)
MONOCYTES # BLD AUTO: 0.6 10E9/L (ref 0–1.3)
MONOCYTES NFR BLD AUTO: 10.1 %
MUCOUS THREADS #/AREA URNS LPF: PRESENT /LPF
NEUTROPHILS # BLD AUTO: 2.4 10E9/L (ref 1.6–8.3)
NEUTROPHILS NFR BLD AUTO: 44.4 %
NITRATE UR QL: NEGATIVE
NRBC # BLD AUTO: 0 10*3/UL
NRBC BLD AUTO-RTO: 0 /100
PH UR STRIP: 6.5 PH (ref 5–7)
PLATELET # BLD AUTO: 270 10E9/L (ref 150–450)
POTASSIUM SERPL-SCNC: 4.1 MMOL/L (ref 3.4–5.3)
RBC # BLD AUTO: 4.51 10E12/L (ref 3.8–5.2)
RBC #/AREA URNS AUTO: 41 /HPF (ref 0–2)
SODIUM SERPL-SCNC: 137 MMOL/L (ref 133–144)
SOURCE: ABNORMAL
SP GR UR STRIP: 1.03 (ref 1–1.03)
SQUAMOUS #/AREA URNS AUTO: 4 /HPF (ref 0–1)
UROBILINOGEN UR STRIP-MCNC: NORMAL MG/DL (ref 0–2)
WBC # BLD AUTO: 5.4 10E9/L (ref 4–11)
WBC #/AREA URNS AUTO: <1 /HPF (ref 0–5)

## 2021-03-07 PROCEDURE — 258N000003 HC RX IP 258 OP 636: Performed by: EMERGENCY MEDICINE

## 2021-03-07 PROCEDURE — 74176 CT ABD & PELVIS W/O CONTRAST: CPT

## 2021-03-07 PROCEDURE — 96361 HYDRATE IV INFUSION ADD-ON: CPT

## 2021-03-07 PROCEDURE — 96374 THER/PROPH/DIAG INJ IV PUSH: CPT

## 2021-03-07 PROCEDURE — 99285 EMERGENCY DEPT VISIT HI MDM: CPT | Mod: 25

## 2021-03-07 PROCEDURE — 85025 COMPLETE CBC W/AUTO DIFF WBC: CPT | Performed by: EMERGENCY MEDICINE

## 2021-03-07 PROCEDURE — 96375 TX/PRO/DX INJ NEW DRUG ADDON: CPT

## 2021-03-07 PROCEDURE — 80048 BASIC METABOLIC PNL TOTAL CA: CPT | Performed by: EMERGENCY MEDICINE

## 2021-03-07 PROCEDURE — 84702 CHORIONIC GONADOTROPIN TEST: CPT

## 2021-03-07 PROCEDURE — 250N000011 HC RX IP 250 OP 636: Performed by: EMERGENCY MEDICINE

## 2021-03-07 PROCEDURE — 81001 URINALYSIS AUTO W/SCOPE: CPT | Performed by: EMERGENCY MEDICINE

## 2021-03-07 PROCEDURE — 96376 TX/PRO/DX INJ SAME DRUG ADON: CPT

## 2021-03-07 RX ORDER — TAMSULOSIN HYDROCHLORIDE 0.4 MG/1
0.4 CAPSULE ORAL DAILY
Qty: 10 CAPSULE | Refills: 0 | Status: SHIPPED | OUTPATIENT
Start: 2021-03-07 | End: 2021-03-17

## 2021-03-07 RX ORDER — ONDANSETRON 4 MG/1
4 TABLET, ORALLY DISINTEGRATING ORAL EVERY 6 HOURS PRN
Qty: 10 TABLET | Refills: 0 | Status: SHIPPED | OUTPATIENT
Start: 2021-03-07 | End: 2021-03-10

## 2021-03-07 RX ORDER — ONDANSETRON 2 MG/ML
8 INJECTION INTRAMUSCULAR; INTRAVENOUS ONCE
Status: COMPLETED | OUTPATIENT
Start: 2021-03-07 | End: 2021-03-07

## 2021-03-07 RX ORDER — HYDROMORPHONE HYDROCHLORIDE 1 MG/ML
0.5 INJECTION, SOLUTION INTRAMUSCULAR; INTRAVENOUS; SUBCUTANEOUS
Status: DISCONTINUED | OUTPATIENT
Start: 2021-03-07 | End: 2021-03-07 | Stop reason: HOSPADM

## 2021-03-07 RX ORDER — KETOROLAC TROMETHAMINE 15 MG/ML
15 INJECTION, SOLUTION INTRAMUSCULAR; INTRAVENOUS ONCE
Status: COMPLETED | OUTPATIENT
Start: 2021-03-07 | End: 2021-03-07

## 2021-03-07 RX ORDER — OXYCODONE HYDROCHLORIDE 5 MG/1
5 TABLET ORAL EVERY 6 HOURS PRN
Qty: 12 TABLET | Refills: 0 | Status: SHIPPED | OUTPATIENT
Start: 2021-03-07 | End: 2022-03-18

## 2021-03-07 RX ADMIN — KETOROLAC TROMETHAMINE 15 MG: 15 INJECTION, SOLUTION INTRAMUSCULAR; INTRAVENOUS at 08:49

## 2021-03-07 RX ADMIN — HYDROMORPHONE HYDROCHLORIDE 0.5 MG: 1 INJECTION, SOLUTION INTRAMUSCULAR; INTRAVENOUS; SUBCUTANEOUS at 09:52

## 2021-03-07 RX ADMIN — HYDROMORPHONE HYDROCHLORIDE 0.5 MG: 1 INJECTION, SOLUTION INTRAMUSCULAR; INTRAVENOUS; SUBCUTANEOUS at 08:50

## 2021-03-07 RX ADMIN — SODIUM CHLORIDE 1000 ML: 9 INJECTION, SOLUTION INTRAVENOUS at 08:51

## 2021-03-07 RX ADMIN — ONDANSETRON 8 MG: 2 INJECTION INTRAMUSCULAR; INTRAVENOUS at 08:49

## 2021-03-07 ASSESSMENT — ENCOUNTER SYMPTOMS
NAUSEA: 1
HEMATURIA: 0
DYSURIA: 0
BACK PAIN: 1
CONSTIPATION: 0
FLANK PAIN: 1
DIFFICULTY URINATING: 0
ABDOMINAL PAIN: 1

## 2021-03-07 NOTE — ED PROVIDER NOTES
History   Chief Complaint:  Flank Pain    The history is provided by the patient.      Evangelina De Anda is a 55 year old female who presents with flank pain. The patient reports that not long after she woke up this morning she started to experience excruciating flank pain that radiates to her right lower abdomen and down her hip with associated nausea. She thought she had been sitting funny and went to lay down with no relief. There are no alleviating or aggravating factors to her pain. Denies difficulty urinating, dysuria, hematuria, difficult bowel movements, recent abdominal trauma or surgeries.     Review of Systems   Gastrointestinal: Positive for abdominal pain and nausea. Negative for constipation.   Genitourinary: Positive for flank pain. Negative for difficulty urinating, dysuria and hematuria.   Musculoskeletal: Positive for back pain.   All other systems reviewed and are negative.    Allergies:  No known drug allergies.     Medications:  Prilosec    Past Medical History:    Vulvar intraepithelial neoplasia II  Lateral epicondylitis of left elbow  Tubular adenoma  Adjustment disorder with mixed anxiety and depressed mood  Unexplained endometrial cells on cervical Pap semar     Past Surgical History:    Appendectomy  Excise vulva wide   C section  Salpingectomy  Tonsillectomy and adenoidectomy  Tubal ligation    Family History:    CAD  Myocardial infarction   Cancer  MS    Social History:  History of alcohol abuse and tobacco use. Patient was dropped off. Presents alone.     Physical Exam     Patient Vitals for the past 24 hrs:   BP Temp Pulse Resp SpO2   03/07/21 1000 105/70 -- 62 -- 98 %   03/07/21 0915 -- -- -- -- 96 %   03/07/21 0900 116/70 -- (!) 48 -- 94 %   03/07/21 0852 130/81 -- 51 -- --   03/07/21 0835 137/75 97.9  F (36.6  C) 65 18 100 %       Physical Exam    Constitutional:  Pacing the room in severe discomfort  Eyes:    Conjunctiva normal  Neck:    Supple, no meningismus.     CV:      Regular rate and rhythm.      No murmurs, rubs or gallops.     No lower extremity edema.  PULM:    Clear to auscultation bilateral.       No respiratory distress.      Good air exchange.     No rales or wheezing.  ABD:    Soft, non-distended.       No abdominal tenderness despite objective signs of severe pain.     Bowel sounds normal.     No pulsatile masses.       No rebound, guarding or rigidity.     No CVA tenderness.      No hepatosplenomegaly.  MSK:     No gross deformity to all four extremities.   LYMPH:   No cervical lymphadenopathy.  NEURO:   Alert.  Good muscular tone, no atrophy.   Skin:    Warm, dry and intact.    Psych:    Mood is depressed and affect is appropriate.      Emergency Department Course     Imaging:  Abd/pelvis CT no contrast - Stone Protocol   1.  Obstructing 0.4 cm stone in the distal right ureter causes mild right hydronephrosis.  2.  Moderate amount of stool throughout the colon.  3.  Trace amount of nonspecific free fluid in the pelvis.  Reading per radiology    Laboratory:  CBC: WBC 5.4, HGB 13.6,    BMP: WNL (Creatinine 0.92)   ISTAT HCG Quantitative Pregnancy POCT: <5.0    UA with Microscopic: Blood (A), Protein Albumin 10(A), RBC/HPF 41(H), Squamous Epithelial/HPF 4(H), Mucous Present(A), Calcium Oxalate Moderate(A) o/w WNL     Emergency Department Course:    Reviewed:  I reviewed nursing notes, vitals and past medical history    Assessments:  0840 I obtained history and examined the patient as noted above.   0954 I rechecked the patient and explained findings.   1034 I rechecked the patient, who reports feeling better.     Interventions:  0849 Zofran 8 mg IV  0849 Toradol 15 mg IV  0850 Dilaudid 0.5 mg IV  0851 NS 1,000 mL IV  0952 Dilaudid 0.5 mg IV    Disposition:  The patient was discharged to home.     Impression & Plan     CMS Diagnoses: None    Medical Decision Making:    Evangelina De Anda is a 55 year old female who presented with unilateral flank & abdominal pain  consistent with renal colic. CT confirms a 4 mm ureteral stone at the distal ureter.  Renal function is normal/baseline.  CT and lab workup show no other alternative etiology that could be causing her symptoms (e.g., AAA, appendicitis, pyelonephritis). There is no fever or convincing evidence of a urinary tract infection. On recheck, her pain is controlled with interventions in the ED and she is tolerating POs. I will prescribe supportive medications and Flomax to facilitate stone passage. I have advised her to return for uncontrolled pain, vomiting, fever, or any other concerning symptoms. I also advised to strain her urine to look for a stone and submit it to her primary doctor for lab analysis.  Finally, I have advised follow up with urology within 3-5 days.      Covid-19  Evangelina De Anda was evaluated during a global COVID-19 pandemic, which necessitated consideration that the patient might be at risk for infection with the SARS-CoV-2 virus that causes COVID-19.   Applicable protocols for evaluation were followed during the patient's care.     Diagnosis:    ICD-10-CM    1. Renal colic  N23        Discharge Medications:  New Prescriptions    ONDANSETRON (ZOFRAN ODT) 4 MG ODT TAB    Take 1 tablet (4 mg) by mouth every 6 hours as needed for nausea    OXYCODONE (ROXICODONE) 5 MG TABLET    Take 1 tablet (5 mg) by mouth every 6 hours as needed for pain    TAMSULOSIN (FLOMAX) 0.4 MG CAPSULE    Take 1 capsule (0.4 mg) by mouth daily for 10 doses       Scribe Disclosure:  I, Elsa Black, am serving as a scribe at 8:40 AM on 3/7/2021 to document services personally performed by Jorge Garcia MD based on my observations and the provider's statements to me.              Jorge Garcia MD  03/07/21 9617

## 2021-03-08 ENCOUNTER — VIRTUAL VISIT (OUTPATIENT)
Dept: UROLOGY | Facility: CLINIC | Age: 56
End: 2021-03-08
Payer: COMMERCIAL

## 2021-03-08 ENCOUNTER — TELEPHONE (OUTPATIENT)
Dept: UROLOGY | Facility: CLINIC | Age: 56
End: 2021-03-08

## 2021-03-08 VITALS — BODY MASS INDEX: 21.19 KG/M2 | HEIGHT: 67 IN | WEIGHT: 135 LBS

## 2021-03-08 DIAGNOSIS — N20.0 RENAL STONE: Primary | ICD-10-CM

## 2021-03-08 PROCEDURE — 99203 OFFICE O/P NEW LOW 30 MIN: CPT | Mod: 95 | Performed by: PHYSICIAN ASSISTANT

## 2021-03-08 RX ORDER — TAMSULOSIN HYDROCHLORIDE 0.4 MG/1
0.4 CAPSULE ORAL DAILY
Qty: 21 CAPSULE | Refills: 0 | Status: SHIPPED | OUTPATIENT
Start: 2021-03-08 | End: 2022-03-18

## 2021-03-08 ASSESSMENT — MIFFLIN-ST. JEOR: SCORE: 1239.99

## 2021-03-08 ASSESSMENT — PAIN SCALES - GENERAL: PAINLEVEL: NO PAIN (0)

## 2021-03-08 NOTE — PATIENT INSTRUCTIONS
Strain urine!  Notify me if you capture a stone.     Continue with tamsulosin.        Standard recommendations on kidney stone prevention:  -These include maintaining fluid intake of 3 liters per day or more with a goal of making 2 or more liters of urine per day.  If alcoholic or caffeinated beverages are consumed, you need to drink water along with these beverages to maintain hydration.    -A few ounces of lemon juice concentrate a day diluted in water can help prevent stones (citrate is a stone inhibitor).    -Sodium influences calcium excretion in the urine. Limit intake of red meat, salt, and salty processed foods.    -Weight loss, if overweight, can reduce the recurrence of kidney stones.  -Diabetes-if diabetic, you are at a greater risk of having kidney stones.  -Maintain calcium intake in diet through continued consumption of dairy products etc.    -Limit foods that are high in oxalate such as spinach, sweet potatoes, dark chocolate, soy products, and some nuts such as peanuts.   -Medications that can increase risk of kidney stones: Ephedrine, Guaifenesin, Triamterene, Lasix, Diamox, Topamax, Zonegran, laxatives.     -Additional/different recommendations pending any stone analysis.

## 2021-03-08 NOTE — PROGRESS NOTES
*SEND LINK TO CELL PHONE*  PT THINKS SHE HAS A STONE.  PT DENIES DYSURIA OR GROSS HEM.  PT WENT TO ER AND HAD CT DONE THERE.  PT WAS AT [facility] ED  PT IS TAKING TYLENOL AND ASPRIN AS WELL.  PT NOT IN PAIN NOW BUT HAD PAIN THIS MORNING.      Evangelina is a 55 year old who is being evaluated via a billable video visit.      How would you like to obtain your AVS? MyChart  If the video visit is dropped, the invitation should be resent by: Text to cell phone: [phone]  Will anyone else be joining your video visit? No      Video Start Time: 2:07 PM  Video-Visit Details    Type of service:  Video Visit    Video End Time:2:27 PM    Originating Location (pt. Location): Home    Distant Location (provider location):  [facility] CME     Platform used for Video Visit: [employer]    CC: right ureteral stone.    HPI: It is a pleasure to see Ms. Evangelina De Anda, a pleasant 55 year old female seen today via billable video visit in ER follow up for evaluation of the above. This was first detected on CT in the ED on 3/7/21 after the patient presented complaining of acute renal colic symptoms. The stone measures 4 mm and is located in the distal right ureter with associated hydronephrosis. UA in the ED was negative for infection. BMP revealed Cr and Ca to be normal. With pain under good control, the patient was discharged with a prescription for tamsulosin and instructions to follow up with urology.    Currently, the patient report . The patient has been straining their urine with no captured stones thus far. Denies gross hematuria, dysuria, fevers, chills, N/V. No prior history of kidney stones.    RECENT IMAGING:  CT ABDOMEN PELVIS WITHOUT CONTRAST  3/7/2021 9:26 AM     CLINICAL HISTORY: Right flank pain.  TECHNIQUE: CT scan of the abdomen and pelvis was performed without IV  contrast. Multiplanar reformats were obtained. Dose reduction  techniques were used.  CONTRAST: None.  COMPARISON: 5/16/2019.     FINDINGS:    LOWER CHEST: The visualized lung bases are clear.     HEPATOBILIARY: Unremarkable. No hepatic masses are seen.     PANCREAS: Normal.     SPLEEN: Normal.     ADRENAL GLANDS: Normal.     KIDNEYS/BLADDER: Obstructing 0.4 cm stone in the distal right ureter  causes mild right hydronephrosis. No other urinary calculi are  identified. No hydronephrosis on the left.     BOWEL: No bowel obstruction. Moderate amount of stool in the colon. No  convincing evidence for colitis or diverticulitis. Unremarkable  appendix.     PELVIC ORGANS: Unremarkable.     LYMPH NODES: No enlarged lymph nodes are identified in the abdomen or  pelvis.     VASCULATURE: Mild atherosclerotic aortoiliac calcification.     ADDITIONAL FINDINGS: Trace amount of nonspecific free fluid in the  pelvis.     MUSCULOSKELETAL: Unremarkable.                                                                      IMPRESSION:   1.  Obstructing 0.4 cm stone in the distal right ureter causes mild  right hydronephrosis.  2.  Moderate amount of stool throughout the colon.  3.  Trace amount of nonspecific free fluid in the pelvis.    Past Medical History:   Diagnosis Date     Alcohol abuse, in remission      Endometrial cells on cervical Pap smear inconsistent w/LMP 2012    endo bx WNL     MAYRA II (vulvar intraepithelial neoplasia II) 2012       Past Surgical History:   Procedure Laterality Date     APPENDECTOMY      age 6 yrs.     COLONOSCOPY  2015    Dr. Estella LAROSE     EXCISE VULVA WIDE LOCAL  7/15/2014    Procedure: EXCISE VULVA WIDE LOCAL;  Surgeon: Melinda Whitten DO;  Location: [facility] OR     GYN SURGERY           LAPAROSCOPIC SALPINGECTOMY Bilateral 2019    Procedure: Laparoscopic bilateral salpingectomy;  Surgeon: Melinda Whitten DO;  Location:  OR     SURGICAL HISTORY OF -       C section     TONSILLECTOMY      T&A as a child     TUBAL LIGATION  2019       Current Outpatient Medications   Medication Sig Dispense Refill      ondansetron (ZOFRAN ODT) 4 MG ODT tab Take 1 tablet (4 mg) by mouth every 6 hours as needed for nausea 10 tablet 0     oxyCODONE (ROXICODONE) 5 MG tablet Take 1 tablet (5 mg) by mouth every 6 hours as needed for pain 12 tablet 0     tamsulosin (FLOMAX) 0.4 MG capsule Take 1 capsule (0.4 mg) by mouth daily for 10 doses 10 capsule 0     dextromethorphan (TUSSIN COUGH) 15 MG/5ML syrup Take 10 mLs by mouth 4 times daily as needed for cough       guaiFENesin-codeine (ROBITUSSIN AC) 100-10 MG/5ML solution Take 5-10 mLs by mouth every 4 hours as needed for cough (Patient not taking: Reported on 3/28/2020) 120 mL 0     ibuprofen (ADVIL,MOTRIN) 800 MG tablet Take 1 tablet (800 mg) by mouth every 8 hours as needed for moderate pain (Patient not taking: Reported on 3/28/2020) 90 tablet 1     omeprazole (PRILOSEC) 10 MG DR capsule Take 20 mg by mouth daily         Allergies   Allergen Reactions     Pollen Extract        FAMILY HISTORY: There is no family h/o  malignancy.  There is no family h/o urolithiasis.     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     Employer: [employer]   Social Needs     Financial resource strain: Not on file     Food insecurity     Worry: Not on file     Inability: Not on file     Transportation needs     Medical: Not on file     Non-medical: Not on file   Tobacco Use     Smoking status: Former Smoker     Packs/day: 0.00     Types: Cigarettes     Quit date: 2019     Years since quittin.6     Smokeless tobacco: Never Used     Tobacco comment: 1 cig/day    Substance and Sexual Activity     Alcohol use: No     Alcohol/week: 0.0 standard drinks     Comment: stopped 2014     Drug use: No     Sexual activity: Yes     Partners:  "Male   Lifestyle     Physical activity     Days per week: Not on file     Minutes per session: Not on file     Stress: Not on file   Relationships     Social connections     Talks on phone: Not on file     Gets together: Not on file     Attends [employer] service: Not on file     Active member of club or organization: Not on file     Attends meetings of clubs or organizations: Not on file     Relationship status: Not on file     Intimate partner violence     Fear of current or ex partner: Not on file     Emotionally abused: Not on file     Physically abused: Not on file     Forced sexual activity: Not on file   Other Topics Concern      Service Not Asked     Blood Transfusions Not Asked     Caffeine Concern Not Asked     Occupational Exposure Not Asked     Hobby Hazards Not Asked     Sleep Concern Not Asked     Stress Concern Not Asked     Weight Concern Not Asked     Special Diet No     Comment: low salt. tries to eat healthy. not as much dairy.     Back Care Not Asked     Exercise Yes     Bike Helmet Not Asked     Seat Belt Not Asked     Self-Exams Not Asked     Parent/sibling w/ CABG, MI or angioplasty before 65F 55M? Yes     Comment: sister  at 38 of heart attack   Social History Narrative     -single, working 2 jobs , 11.4 yo daughter, no pets        ROS: A comprehensive 14 point ROS was obtained and otherwise negative except for that outlined above in the HPI.    GENERAL PHYSICAL EXAM:   Ht 1.702 m (5' 7\")   Wt 61.2 kg (135 lb)   BMI 21.14 kg/m    PSYCH: NAD  EYES: EOMI  MOUTH: MMM  NECK: Supple, no notable adenopathy  RESP: Unlabored breathing  NEURO: AAO x3    UA  Neg for infection.      ASSESSMENT AND PLAN: 55 year old female with a right-sided calculus with associated  hydronephrosis. Symptoms currently controlled. Given size and location of stone, and fact that symptoms are well controlled, it is reasonable to continue with trial of medical expulsive therapy at this time.   - " Continue tamsulosin 0.4 mg daily. Refills provided.  - Continue to push fluids. Strain all urine and submit any captured stones for analysis.  - Oxy for pain.  - Follow up in 2 weeks  - Warning signs discussed including fevers, chills, N/V, gross hematuria, severe pain that is refractory to medications which should prompt more urgent evaluation. Patient understands to proceed to the ER should these warning signs occur outside of clinic hours.    During counseling for this visit, we covered the natural history of kidney stones, the risk of progression to symptomatic pain/infection, and the possibility of renal failure/kidney damage.  We covered treatment options including medical expulsive therapy, ureteroscopy/laser/stent.     Standard recommendations on kidney stone prevention were provided.     Lashawn Milligan PA-C  [facility]    31 min spent on the encounter reviewing ER notes, CT, UA, labs

## 2021-03-08 NOTE — LETTER
3/8/2021       RE: Evangelina De Anda  [address] [facility] [address]     Dear Colleague,    Thank you for referring your patient, Evangelina De Anda, to the [facility] [place] at [facility]. Please see a copy of my visit note below.    *SEND LINK TO CELL PHONE*  PT THINKS SHE HAS A STONE.  PT DENIES DYSURIA OR GROSS HEM.  PT WENT TO ER AND HAD CT DONE THERE.  PT WAS AT [facility] ED  PT IS TAKING TYLENOL AND ASPRIN AS WELL.  PT NOT IN PAIN NOW BUT HAD PAIN THIS MORNING.      Evangelina is a 55 year old who is being evaluated via a billable video visit.      How would you like to obtain your AVS? MyChart  If the video visit is dropped, the invitation should be resent by: Text to cell phone: [phone]  Will anyone else be joining your video visit? No      Video Start Time: 2:07 PM  Video-Visit Details    Type of service:  Video Visit    Video End Time:2:27 PM    Originating Location (pt. Location): Home    Distant Location (provider location):  [facility] [place]     Platform used for Video Visit: Iconfinder    CC: right ureteral stone.    HPI: It is a pleasure to see Ms. Evangelina De Anda, a pleasant 55 year old female seen today via billable video visit in ER follow up for evaluation of the above. This was first detected on CT in the ED on 3/7/21 after the patient presented complaining of acute renal colic symptoms. The stone measures 4 mm and is located in the distal right ureter with associated hydronephrosis. UA in the ED was negative for infection. BMP revealed Cr and Ca to be normal. With pain under good control, the patient was discharged with a prescription for tamsulosin and instructions to follow up with urology.    Currently, the patient report . The patient has been straining their urine with no captured stones thus far. Denies gross hematuria, dysuria, fevers, chills, N/V. No prior history of kidney stones.    RECENT  IMAGING:  CT ABDOMEN PELVIS WITHOUT CONTRAST  3/7/2021 9:26 AM     CLINICAL HISTORY: Right flank pain.  TECHNIQUE: CT scan of the abdomen and pelvis was performed without IV  contrast. Multiplanar reformats were obtained. Dose reduction  techniques were used.  CONTRAST: None.  COMPARISON: 2019.     FINDINGS:   LOWER CHEST: The visualized lung bases are clear.     HEPATOBILIARY: Unremarkable. No hepatic masses are seen.     PANCREAS: Normal.     SPLEEN: Normal.     ADRENAL GLANDS: Normal.     KIDNEYS/BLADDER: Obstructing 0.4 cm stone in the distal right ureter  causes mild right hydronephrosis. No other urinary calculi are  identified. No hydronephrosis on the left.     BOWEL: No bowel obstruction. Moderate amount of stool in the colon. No  convincing evidence for colitis or diverticulitis. Unremarkable  appendix.     PELVIC ORGANS: Unremarkable.     LYMPH NODES: No enlarged lymph nodes are identified in the abdomen or  pelvis.     VASCULATURE: Mild atherosclerotic aortoiliac calcification.     ADDITIONAL FINDINGS: Trace amount of nonspecific free fluid in the  pelvis.     MUSCULOSKELETAL: Unremarkable.                                                                      IMPRESSION:   1.  Obstructing 0.4 cm stone in the distal right ureter causes mild  right hydronephrosis.  2.  Moderate amount of stool throughout the colon.  3.  Trace amount of nonspecific free fluid in the pelvis.    Past Medical History:   Diagnosis Date     Alcohol abuse, in remission      Endometrial cells on cervical Pap smear inconsistent w/LMP 2012    endo bx WNL     MAYRA II (vulvar intraepithelial neoplasia II) 2012       Past Surgical History:   Procedure Laterality Date     APPENDECTOMY      age 6 yrs.     COLONOSCOPY  2015    Dr. Estella LAROSE     EXCISE VULVA WIDE LOCAL  7/15/2014    Procedure: EXCISE VULVA WIDE LOCAL;  Surgeon: Melinda Whitten DO;  Location: [facility] OR     GYN SURGERY           LAPAROSCOPIC  SALPINGECTOMY Bilateral 9/4/2019    Procedure: Laparoscopic bilateral salpingectomy;  Surgeon: Melinda Whitten DO;  Location: [facility] OR     SURGICAL HISTORY OF -   2002    C section     TONSILLECTOMY      T&A as a child     TUBAL LIGATION  09/04/2019       Current Outpatient Medications   Medication Sig Dispense Refill     ondansetron (ZOFRAN ODT) 4 MG ODT tab Take 1 tablet (4 mg) by mouth every 6 hours as needed for nausea 10 tablet 0     oxyCODONE (ROXICODONE) 5 MG tablet Take 1 tablet (5 mg) by mouth every 6 hours as needed for pain 12 tablet 0     tamsulosin (FLOMAX) 0.4 MG capsule Take 1 capsule (0.4 mg) by mouth daily for 10 doses 10 capsule 0     dextromethorphan (TUSSIN COUGH) 15 MG/5ML syrup Take 10 mLs by mouth 4 times daily as needed for cough       guaiFENesin-codeine (ROBITUSSIN AC) 100-10 MG/5ML solution Take 5-10 mLs by mouth every 4 hours as needed for cough (Patient not taking: Reported on 3/28/2020) 120 mL 0     ibuprofen (ADVIL,MOTRIN) 800 MG tablet Take 1 tablet (800 mg) by mouth every 8 hours as needed for moderate pain (Patient not taking: Reported on 3/28/2020) 90 tablet 1     omeprazole (PRILOSEC) 10 MG DR capsule Take 20 mg by mouth daily         Allergies   Allergen Reactions     Pollen Extract        FAMILY HISTORY: There is no family h/o  malignancy.  There is no family h/o urolithiasis.     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     Employer: [employer]   Social Needs     Financial resource strain: Not on file     Food insecurity     Worry: Not on file     Inability: Not on file     Transportation needs     Medical: Not on file     Non-medical: Not on file   Tobacco Use     Smoking status: Former Smoker     Packs/day: 0.00      "Types: Cigarettes     Quit date: 2019     Years since quittin.6     Smokeless tobacco: Never Used     Tobacco comment: 1 cig/day    Substance and Sexual Activity     Alcohol use: No     Alcohol/week: 0.0 standard drinks     Comment: stopped 2014     Drug use: No     Sexual activity: Yes     Partners: Male   Lifestyle     Physical activity     Days per week: Not on file     Minutes per session: Not on file     Stress: Not on file   Relationships     Social connections     Talks on phone: Not on file     Gets together: Not on file     Attends [employer]ist service: Not on file     Active member of club or organization: Not on file     Attends meetings of clubs or organizations: Not on file     Relationship status: Not on file     Intimate partner violence     Fear of current or ex partner: Not on file     Emotionally abused: Not on file     Physically abused: Not on file     Forced sexual activity: Not on file   Other Topics Concern      Service Not Asked     Blood Transfusions Not Asked     Caffeine Concern Not Asked     Occupational Exposure Not Asked     Hobby Hazards Not Asked     Sleep Concern Not Asked     Stress Concern Not Asked     Weight Concern Not Asked     Special Diet No     Comment: low salt. tries to eat healthy. not as much dairy.     Back Care Not Asked     Exercise Yes     Bike Helmet Not Asked     Seat Belt Not Asked     Self-Exams Not Asked     Parent/sibling w/ CABG, MI or angioplasty before 65F 55M? Yes     Comment: sister  at 38 of heart attack   Social History Narrative     -single, working 2 jobs , 11.6 yo daughter, no pets        ROS: A comprehensive 14 point ROS was obtained and otherwise negative except for that outlined above in the HPI.    GENERAL PHYSICAL EXAM:   Ht 1.702 m (5' 7\")   Wt 61.2 kg (135 lb)   BMI 21.14 kg/m    PSYCH: NAD  EYES: EOMI  MOUTH: MMM  NECK: Supple, no notable adenopathy  RESP: Unlabored breathing  NEURO: AAO x3    UA  Neg for " infection.      ASSESSMENT AND PLAN: 55 year old female with a right-sided calculus with associated  hydronephrosis. Symptoms currently controlled. Given size and location of stone, and fact that symptoms are well controlled, it is reasonable to continue with trial of medical expulsive therapy at this time.   - Continue tamsulosin 0.4 mg daily. Refills provided.  - Continue to push fluids. Strain all urine and submit any captured stones for analysis.  - Oxy for pain.  - Follow up in 2 weeks  - Warning signs discussed including fevers, chills, N/V, gross hematuria, severe pain that is refractory to medications which should prompt more urgent evaluation. Patient understands to proceed to the ER should these warning signs occur outside of clinic hours.    During counseling for this visit, we covered the natural history of kidney stones, the risk of progression to symptomatic pain/infection, and the possibility of renal failure/kidney damage.  We covered treatment options including medical expulsive therapy, ureteroscopy/laser/stent.     Standard recommendations on kidney stone prevention were provided.     Lashawn Milligan PA-C  [facility]    31 min spent on the encounter reviewing ER notes, CT, UA, labs

## 2021-03-08 NOTE — TELEPHONE ENCOUNTER
----- Message from Lidya Mo sent at 3/8/2021  2:46 PM CST -----  2 weeks, virtual visit stone check    CALLY

## 2021-03-24 ENCOUNTER — VIRTUAL VISIT (OUTPATIENT)
Dept: UROLOGY | Facility: CLINIC | Age: 56
End: 2021-03-24
Payer: COMMERCIAL

## 2021-03-24 VITALS — BODY MASS INDEX: 21.19 KG/M2 | HEIGHT: 67 IN | WEIGHT: 135 LBS

## 2021-03-24 DIAGNOSIS — N20.1 URETERAL STONE: Primary | ICD-10-CM

## 2021-03-24 PROCEDURE — 99213 OFFICE O/P EST LOW 20 MIN: CPT | Mod: 95 | Performed by: PHYSICIAN ASSISTANT

## 2021-03-24 ASSESSMENT — PAIN SCALES - GENERAL: PAINLEVEL: NO PAIN (0)

## 2021-03-24 ASSESSMENT — MIFFLIN-ST. JEOR: SCORE: 1239.99

## 2021-03-24 NOTE — LETTER
3/24/2021       RE: Evangelina De Anda  [address] [facility] [address]     Dear Colleague,    Thank you for referring your patient, Evangelina De Anda, to the [facility] [place] at [facility]. Please see a copy of my visit note below.    *SEND LINK TO CELL PHONE*    PT DOESN'T THINK SHE PASSED HER STONE.  PT HAS BEEN STRAINING AND HAS NOT SEEN ANYTHING.  PT DENIES GROSS HEM.  PT GETS UNCOMFORTABLE AT TIMES... BUT NO PAIN.  PT SOMETIMES PT GETS A TWING IN THE BACK.135LB    Evangelina is a 55 year old who is being evaluated via a billable video visit.      How would you like to obtain your AVS? MyChart  If the video visit is dropped, the invitation should be resent by: Text to cell phone: [phone]  Will anyone else be joining your video visit? No      Video Start Time: 11:45 AM  Video-Visit Details    Type of service:  Video Visit    Video End Time:     Originating Location (pt. Location): Home    Distant Location (provider location):  [facility] [place]     Platform used for Video Visit: Sandglaz    CC: right ureteral stone.     HPI: It is a pleasure to see Ms. Evangelina De Anda, a pleasant 55 year old female seen today via billable video visit in follow up for evaluation of the above. This was first detected on CT in the ED on 3/7/21 after the patient presented complaining of acute renal colic symptoms. The stone measures 4 mm and is located in the distal right ureter with associated hydronephrosis. UA in the ED was negative for infection. BMP revealed Cr and Ca to be normal. With pain under good control, the patient was discharged with a prescription for tamsulosin and instructions to follow up with urology.     Currently, the patient reports intermittent discomfort. The patient has been straining their urine with no captured stones thus far. Denies gross hematuria, dysuria, fevers, chills, N/V. No prior history of  kidney stones.     RECENT IMAGING:  CT ABDOMEN PELVIS WITHOUT CONTRAST  3/7/2021 9:26 AM     CLINICAL HISTORY: Right flank pain.  TECHNIQUE: CT scan of the abdomen and pelvis was performed without IV  contrast. Multiplanar reformats were obtained. Dose reduction  techniques were used.  CONTRAST: None.  COMPARISON: 5/16/2019.     FINDINGS:   LOWER CHEST: The visualized lung bases are clear.     HEPATOBILIARY: Unremarkable. No hepatic masses are seen.     PANCREAS: Normal.     SPLEEN: Normal.     ADRENAL GLANDS: Normal.     KIDNEYS/BLADDER: Obstructing 0.4 cm stone in the distal right ureter  causes mild right hydronephrosis. No other urinary calculi are  identified. No hydronephrosis on the left.     BOWEL: No bowel obstruction. Moderate amount of stool in the colon. No  convincing evidence for colitis or diverticulitis. Unremarkable  appendix.     PELVIC ORGANS: Unremarkable.     LYMPH NODES: No enlarged lymph nodes are identified in the abdomen or  pelvis.     VASCULATURE: Mild atherosclerotic aortoiliac calcification.     ADDITIONAL FINDINGS: Trace amount of nonspecific free fluid in the  pelvis.     MUSCULOSKELETAL: Unremarkable.                                                                      IMPRESSION:   1.  Obstructing 0.4 cm stone in the distal right ureter causes mild  right hydronephrosis.  2.  Moderate amount of stool throughout the colon.  3.  Trace amount of nonspecific free fluid in the pelvis.     Past Medical History        Past Medical History:   Diagnosis Date     Alcohol abuse, in remission       Endometrial cells on cervical Pap smear inconsistent w/LMP 9/2012     endo bx WNL     MAYRA II (vulvar intraepithelial neoplasia II) 12/2012            Past Surgical History         Past Surgical History:   Procedure Laterality Date     APPENDECTOMY         age 6 yrs.     COLONOSCOPY   2/23/2015     Dr. Estella SOLIS     EXCISE VULVA WIDE LOCAL   7/15/2014     Procedure: EXCISE VULVA WIDE LOCAL;  Surgeon:  Melinda Whitten DO;  Location: [facility] OR     GYN SURGERY              LAPAROSCOPIC SALPINGECTOMY Bilateral 2019     Procedure: Laparoscopic bilateral salpingectomy;  Surgeon: Melinda Whitten DO;  Location: [facility] OR     SURGICAL HISTORY OF -         C section     TONSILLECTOMY         T&A as a child     TUBAL LIGATION   2019            Current Outpatient Prescriptions          Current Outpatient Medications   Medication Sig Dispense Refill     ondansetron (ZOFRAN ODT) 4 MG ODT tab Take 1 tablet (4 mg) by mouth every 6 hours as needed for nausea 10 tablet 0     oxyCODONE (ROXICODONE) 5 MG tablet Take 1 tablet (5 mg) by mouth every 6 hours as needed for pain 12 tablet 0     tamsulosin (FLOMAX) 0.4 MG capsule Take 1 capsule (0.4 mg) by mouth daily for 10 doses 10 capsule 0     dextromethorphan (TUSSIN COUGH) 15 MG/5ML syrup Take 10 mLs by mouth 4 times daily as needed for cough         guaiFENesin-codeine (ROBITUSSIN AC) 100-10 MG/5ML solution Take 5-10 mLs by mouth every 4 hours as needed for cough (Patient not taking: Reported on 3/28/2020) 120 mL 0     ibuprofen (ADVIL,MOTRIN) 800 MG tablet Take 1 tablet (800 mg) by mouth every 8 hours as needed for moderate pain (Patient not taking: Reported on 3/28/2020) 90 tablet 1     omeprazole (PRILOSEC) 10 MG DR capsule Take 20 mg by mouth daily                     Allergies   Allergen Reactions     Pollen Extract           FAMILY HISTORY: There is no family h/o  malignancy.  There is no family h/o urolithiasis.      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       Employer: [employer]   Social Needs     Financial resource strain: Not on file     Food insecurity        Worry: Not on file       Inability: Not on file     Transportation needs       Medical: Not on file       Non-medical: Not on file   Tobacco Use     Smoking status: Former Smoker       Packs/day: 0.00       Types: Cigarettes       Quit date: 2019       Years since quittin.6     Smokeless tobacco: Never Used     Tobacco comment: 1 cig/day    Substance and Sexual Activity     Alcohol use: No       Alcohol/week: 0.0 standard drinks       Comment: stopped 2014     Drug use: No     Sexual activity: Yes       Partners: Male   Lifestyle     Physical activity       Days per week: Not on file       Minutes per session: Not on file     Stress: Not on file   Relationships     Social connections       Talks on phone: Not on file       Gets together: Not on file       Attends [employer] service: Not on file       Active member of club or organization: Not on file       Attends meetings of clubs or organizations: Not on file       Relationship status: Not on file     Intimate partner violence       Fear of current or ex partner: Not on file       Emotionally abused: Not on file       Physically abused: Not on file       Forced sexual activity: Not on file   Other Topics Concern      Service Not Asked     Blood Transfusions Not Asked     Caffeine Concern Not Asked     Occupational Exposure Not Asked     Hobby Hazards Not Asked     Sleep Concern Not Asked     Stress Concern Not Asked     Weight Concern Not Asked     Special Diet No       Comment: low salt. tries to eat healthy. not as much dairy.     Back Care Not Asked     Exercise Yes     Bike Helmet Not Asked     Seat Belt Not Asked     Self-Exams Not Asked     Parent/sibling w/ CABG, MI or angioplasty before 65F 55M? Yes       Comment: sister  at 38 of heart attack   Social History Narrative      -single, working 2 jobs , 11.4 yo daughter, no pets             ROS: A comprehensive 14 point ROS was obtained and otherwise negative except for  "that outlined above in the HPI.     GENERAL PHYSICAL EXAM:   Ht 1.702 m (5' 7\")   Wt 61.2 kg (135 lb)   BMI 21.14 kg/m    PSYCH: NAD  EYES: EOMI  MOUTH: MMM  NECK: Supple, no notable adenopathy  RESP: Unlabored breathing  NEURO: AAO x3     UA  Neg for infection.        ASSESSMENT AND PLAN: 55 year old female with a right-sided calculus with associated  hydronephrosis, unsure of passage  - Continue tamsulosin 0.4 mg daily.  - Continue to push fluids. Strain all urine and submit any captured stones for analysis.  - CT to eval for passage. Discussed cysto, URS, laser litho and stent if stone is still present.   - Warning signs discussed including fevers, chills, N/V, gross hematuria, severe pain that is refractory to medications which should prompt more urgent evaluation. Patient understands to proceed to the ER should these warning signs occur outside of clinic hours.            Lashawn Milligan PA-C  [facility]      "

## 2021-03-24 NOTE — PROGRESS NOTES
*SEND LINK TO CELL PHONE*    PT DOESN'T THINK SHE PASSED HER STONE.  PT HAS BEEN STRAINING AND HAS NOT SEEN ANYTHING.  PT DENIES GROSS HEM.  PT GETS UNCOMFORTABLE AT TIMES... BUT NO PAIN.  PT SOMETIMES PT GETS A TWING IN THE BACK.135LB    Evangelina is a 55 year old who is being evaluated via a billable video visit.      How would you like to obtain your AVS? MyChart  If the video visit is dropped, the invitation should be resent by: Text to cell phone: [phone]  Will anyone else be joining your video visit? No      Video Start Time: 11:45 AM  Video-Visit Details    Type of service:  Video Visit    Video End Time:     Originating Location (pt. Location): Home    Distant Location (provider location):  [facility] [place]     Platform used for Video Visit: Threadflip    CC: right ureteral stone.     HPI: It is a pleasure to see Ms. Evangelina De Anda, a pleasant 55 year old female seen today via billable video visit in follow up for evaluation of the above. This was first detected on CT in the ED on 3/7/21 after the patient presented complaining of acute renal colic symptoms. The stone measures 4 mm and is located in the distal right ureter with associated hydronephrosis. UA in the ED was negative for infection. BMP revealed Cr and Ca to be normal. With pain under good control, the patient was discharged with a prescription for tamsulosin and instructions to follow up with urology.     Currently, the patient reports intermittent discomfort. The patient has been straining their urine with no captured stones thus far. Denies gross hematuria, dysuria, fevers, chills, N/V. No prior history of kidney stones.     RECENT IMAGING:  CT ABDOMEN PELVIS WITHOUT CONTRAST  3/7/2021 9:26 AM     CLINICAL HISTORY: Right flank pain.  TECHNIQUE: CT scan of the abdomen and pelvis was performed without IV  contrast. Multiplanar reformats were obtained. Dose reduction  techniques were used.  CONTRAST: None.  COMPARISON:  2019.     FINDINGS:   LOWER CHEST: The visualized lung bases are clear.     HEPATOBILIARY: Unremarkable. No hepatic masses are seen.     PANCREAS: Normal.     SPLEEN: Normal.     ADRENAL GLANDS: Normal.     KIDNEYS/BLADDER: Obstructing 0.4 cm stone in the distal right ureter  causes mild right hydronephrosis. No other urinary calculi are  identified. No hydronephrosis on the left.     BOWEL: No bowel obstruction. Moderate amount of stool in the colon. No  convincing evidence for colitis or diverticulitis. Unremarkable  appendix.     PELVIC ORGANS: Unremarkable.     LYMPH NODES: No enlarged lymph nodes are identified in the abdomen or  pelvis.     VASCULATURE: Mild atherosclerotic aortoiliac calcification.     ADDITIONAL FINDINGS: Trace amount of nonspecific free fluid in the  pelvis.     MUSCULOSKELETAL: Unremarkable.                                                                      IMPRESSION:   1.  Obstructing 0.4 cm stone in the distal right ureter causes mild  right hydronephrosis.  2.  Moderate amount of stool throughout the colon.  3.  Trace amount of nonspecific free fluid in the pelvis.     Past Medical History        Past Medical History:   Diagnosis Date     Alcohol abuse, in remission       Endometrial cells on cervical Pap smear inconsistent w/LMP 2012     endo bx WNL     MAYRA II (vulvar intraepithelial neoplasia II) 2012            Past Surgical History         Past Surgical History:   Procedure Laterality Date     APPENDECTOMY         age 6 yrs.     COLONOSCOPY   2015     Dr. Estella LAROSE     EXCISE VULVA WIDE LOCAL   7/15/2014     Procedure: EXCISE VULVA WIDE LOCAL;  Surgeon: Melinda Whitten DO;  Location:  OR     GYN SURGERY              LAPAROSCOPIC SALPINGECTOMY Bilateral 2019     Procedure: Laparoscopic bilateral salpingectomy;  Surgeon: Melinda Whitten DO;  Location:  OR     SURGICAL HISTORY OF -         C section     TONSILLECTOMY         T&A as a  child     TUBAL LIGATION   2019            Current Outpatient Prescriptions          Current Outpatient Medications   Medication Sig Dispense Refill     ondansetron (ZOFRAN ODT) 4 MG ODT tab Take 1 tablet (4 mg) by mouth every 6 hours as needed for nausea 10 tablet 0     oxyCODONE (ROXICODONE) 5 MG tablet Take 1 tablet (5 mg) by mouth every 6 hours as needed for pain 12 tablet 0     tamsulosin (FLOMAX) 0.4 MG capsule Take 1 capsule (0.4 mg) by mouth daily for 10 doses 10 capsule 0     dextromethorphan (TUSSIN COUGH) 15 MG/5ML syrup Take 10 mLs by mouth 4 times daily as needed for cough         guaiFENesin-codeine (ROBITUSSIN AC) 100-10 MG/5ML solution Take 5-10 mLs by mouth every 4 hours as needed for cough (Patient not taking: Reported on 3/28/2020) 120 mL 0     ibuprofen (ADVIL,MOTRIN) 800 MG tablet Take 1 tablet (800 mg) by mouth every 8 hours as needed for moderate pain (Patient not taking: Reported on 3/28/2020) 90 tablet 1     omeprazole (PRILOSEC) 10 MG DR capsule Take 20 mg by mouth daily                     Allergies   Allergen Reactions     Pollen Extract           FAMILY HISTORY: There is no family h/o  malignancy.  There is no family h/o urolithiasis.      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       Employer: [employer]   Social Needs     Financial resource strain: Not on file     Food insecurity       Worry: Not on file       Inability: Not on file     Transportation needs       Medical: Not on file       Non-medical: Not on file   Tobacco Use     Smoking status: Former Smoker       Packs/day: 0.00       Types: Cigarettes       Quit date: 2019       Years since quittin.6     Smokeless tobacco: Never Used  "    Tobacco comment: 1 cig/day    Substance and Sexual Activity     Alcohol use: No       Alcohol/week: 0.0 standard drinks       Comment: stopped 2014     Drug use: No     Sexual activity: Yes       Partners: Male   Lifestyle     Physical activity       Days per week: Not on file       Minutes per session: Not on file     Stress: Not on file   Relationships     Social connections       Talks on phone: Not on file       Gets together: Not on file       Attends Orthodox service: Not on file       Active member of club or organization: Not on file       Attends meetings of clubs or organizations: Not on file       Relationship status: Not on file     Intimate partner violence       Fear of current or ex partner: Not on file       Emotionally abused: Not on file       Physically abused: Not on file       Forced sexual activity: Not on file   Other Topics Concern      Service Not Asked     Blood Transfusions Not Asked     Caffeine Concern Not Asked     Occupational Exposure Not Asked     Hobby Hazards Not Asked     Sleep Concern Not Asked     Stress Concern Not Asked     Weight Concern Not Asked     Special Diet No       Comment: low salt. tries to eat healthy. not as much dairy.     Back Care Not Asked     Exercise Yes     Bike Helmet Not Asked     Seat Belt Not Asked     Self-Exams Not Asked     Parent/sibling w/ CABG, MI or angioplasty before 65F 55M? Yes       Comment: sister  at 38 of heart attack   Social History Narrative      -single, working 2 jobs , 11.6 yo daughter, no pets             ROS: A comprehensive 14 point ROS was obtained and otherwise negative except for that outlined above in the HPI.     GENERAL PHYSICAL EXAM:   Ht 1.702 m (5' 7\")   Wt 61.2 kg (135 lb)   BMI 21.14 kg/m    PSYCH: NAD  EYES: EOMI  MOUTH: MMM  NECK: Supple, no notable adenopathy  RESP: Unlabored breathing  NEURO: AAO x3     UA  Neg for infection.        ASSESSMENT AND PLAN: 55 year old female with a " right-sided calculus with associated  hydronephrosis, unsure of passage  - Continue tamsulosin 0.4 mg daily.  - Continue to push fluids. Strain all urine and submit any captured stones for analysis.  - CT to eval for passage. Discussed cysto, URS, laser litho and stent if stone is still present.   - Warning signs discussed including fevers, chills, N/V, gross hematuria, severe pain that is refractory to medications which should prompt more urgent evaluation. Patient understands to proceed to the ER should these warning signs occur outside of clinic hours.            Lashawn Milligan PA-C  [facility]

## 2021-03-26 ENCOUNTER — HOSPITAL ENCOUNTER (OUTPATIENT)
Dept: CT IMAGING | Facility: CLINIC | Age: 56
Discharge: HOME | End: 2021-03-26
Attending: PHYSICIAN ASSISTANT | Admitting: PHYSICIAN ASSISTANT
Payer: COMMERCIAL

## 2021-03-26 DIAGNOSIS — N20.1 URETERAL STONE: ICD-10-CM

## 2021-03-26 PROCEDURE — 74176 CT ABD & PELVIS W/O CONTRAST: CPT

## 2021-10-24 ENCOUNTER — HEALTH MAINTENANCE LETTER (OUTPATIENT)
Age: 56
End: 2021-10-24

## 2022-02-13 ENCOUNTER — HEALTH MAINTENANCE LETTER (OUTPATIENT)
Age: 57
End: 2022-02-13

## 2022-03-18 ENCOUNTER — OFFICE VISIT (OUTPATIENT)
Dept: URGENT CARE | Facility: URGENT CARE | Age: 57
End: 2022-03-18
Payer: COMMERCIAL

## 2022-03-18 ENCOUNTER — TELEPHONE (OUTPATIENT)
Dept: DERMATOLOGY | Facility: CLINIC | Age: 57
End: 2022-03-18

## 2022-03-18 VITALS
WEIGHT: 135 LBS | OXYGEN SATURATION: 98 % | HEIGHT: 67 IN | TEMPERATURE: 98 F | HEART RATE: 66 BPM | BODY MASS INDEX: 21.19 KG/M2 | SYSTOLIC BLOOD PRESSURE: 112 MMHG | DIASTOLIC BLOOD PRESSURE: 66 MMHG

## 2022-03-18 DIAGNOSIS — L24.9 IRRITANT CONTACT DERMATITIS, UNSPECIFIED TRIGGER: Primary | ICD-10-CM

## 2022-03-18 PROCEDURE — 99213 OFFICE O/P EST LOW 20 MIN: CPT | Performed by: NURSE PRACTITIONER

## 2022-03-18 RX ORDER — METHYLPREDNISOLONE 4 MG
TABLET, DOSE PACK ORAL
Qty: 21 TABLET | Refills: 0 | Status: SHIPPED | OUTPATIENT
Start: 2022-03-18 | End: 2022-11-23

## 2022-03-18 RX ORDER — EMOLLIENT BASE
CREAM (GRAM) TOPICAL 3 TIMES DAILY
Qty: 453 G | Refills: 0 | Status: SHIPPED | OUTPATIENT
Start: 2022-03-18

## 2022-03-18 ASSESSMENT — ENCOUNTER SYMPTOMS
ROS SKIN COMMENTS: FACE AND NECK
SHORTNESS OF BREATH: 0
FEVER: 0
CHILLS: 0
WHEEZING: 0

## 2022-03-18 NOTE — TELEPHONE ENCOUNTER
[facility] Call Center    Phone Message    May a detailed message be left on voicemail: yes     Reason for Call: Appointment Intake      Referring Provider Name: Storm Becker APRN CNP in  URGENT CARE    Diagnosis and/or Symptoms: Irritant contact dermatitis, unspecified trigger; Rash      New Pt Appt with Priority Referral Order - Urgent: 3-5 Days - Pt requesting OX location as she lives in [place]      Please review Referral and Records and call Pt to schedule - she could not wait for first opening end of June so per protocol routing TE to see what you can do since Urgent Referral Order    Please call her to discuss - Thank you!        Action Taken: Message routed to:  Other: OX DERM    Travel Screening: Not Applicable

## 2022-03-18 NOTE — PROGRESS NOTES
Patient presents with:  Derm Problem: rash on her face x 2 days, it did happen 2 weeks ago -- has redness, dryness, burning, stinging.  May be due from face products, --- USED some goldbond      Clinical Decision Making: Focused exam positive for diffuse erythemic appearing face, with mild warmth to touch, however no significant tenderness or lesions present.  Discussed with patient likely irritant contact dermatitis avoid all cleansing agents and consider Vanicream emollient, and cool compress along with Medrol Dosepak.  Patient reports upcoming dermatology appointment however not until early April 2022.  Derm referral placed. Discussed signs and symptoms of worsening skin irritant and when to present for reevaluation.  Education provided.      ICD-10-CM    1. Irritant contact dermatitis, unspecified trigger  L24.9 methylPREDNISolone (MEDROL DOSEPAK) 4 MG tablet therapy pack     emollient (VANICREAM) external cream     Adult Dermatology Referral       Patient Instructions       Patient Education     Understanding Contact Dermatitis     A cool, moist compress can help reduce itching.   Contact dermatitis is a common type of skin rash. It s caused by something that touches the skin and makes it irritated and inflamed. It can occur on skin on any part of the body, such as the face, neck, hands, arms, and legs. Contact dermatitis is not spread from person to person.  Often, the reaction of contact dermatitis occurs 1 to 2 days after contact with the offending agent.    How to say it  LIDNA-tact [id number]  What causes contact dermatitis?  It s caused by something that irritates the skin, or that creates an allergic reaction on the skin. People can get contact dermatitis from many kinds of things. These include:     Plant oils in poison ivy, oak, and sumac    Chemicals in household , solvents, and glue    Chemicals in makeup, soap, laundry detergent, perfume, acne cream, and hair products    Certain  medicines, such as neomycin, bacitracin, benzocaine, and thimerosal    Metals such as nickel, found in some jewelry and watch bands     The sticky material on the back of bandages and tape (adhesive)    Things that can cause tiny breaks in the skin, such as wood, fiberglass, metal tools, and plant thorns    Rubber latex in surgical gloves and other medical supplies  Dermatitis can also be caused by the skin being damp for long periods of time. This can happen from washing your hands too often, or working with wet materials.   Symptoms of contact dermatitis  Symptoms can include skin that is:    Blistered    Burning    Cracked    Dry    Itchy    Painful    Red    Rough, thickened, and leathery    Swollen    Warm  The blisters may ooze fluid and form crusts.  Treatment for contact dermatitis  Treatment is done to help relieve itching and reduce inflammation. The rash should go away in a few days to a few weeks. Treatments include:     Cool, moist compress. Use a clean damp cloth. Put it on the area for 20 to 30 minutes, 5 to 6 times a day for the first 3 days.    Steroid cream or ointment. You can apply this medicine several times a day on clean skin.    Oral corticosteroid. Your healthcare provider may prescribe this medicine if you have severe skin symptoms on a large part of your body.  Your healthcare provider may give you a steroid injection instead of pills.    Oral antihistamine. This medicine can help reduce itching.    Colloidal oatmeal bath. Soaking in water with colloidal oatmeal can help soothe skin.    Plain cream, lotion, or ointment. Cream, lotion, or ointment without medicine can help to soothe and protect your skin.  Living with contact dermatitis  Talk with your healthcare provider about what may have caused your contact dermatitis. Patch testing may help you figure out what caused the rash so you can avoid further contact with it. Once you learn what caused your rash, make sure to avoid that  "substance. If your skin comes into contact with it again, make sure to wash your skin right away. If you can t avoid the substance, wear gloves or other protective clothing before you touch it. Wash the clothing you were wearing. This is because the substance you are allergic to may stay on them until it's washed off. Pets can also pass on allergens such as poison ivy from the plant to your skin. Bathe pets if you suspect they have been in contact. Or use a cream, lotion, or ointment to protect your skin.   When to call your healthcare provider  Call your healthcare provider right away if you have any of these:    Fever of 100.4 F (38 C) or higher, or as directed by your healthcare provider    Symptoms that don t get better, or get worse    New symptoms  [employer] last reviewed this educational content on 6/1/2019 2000-2021 [employer]. All rights reserved. This information is not intended as a substitute for professional medical care. Always follow your healthcare professional's instructions.               HPI: Evangelina De Anda is a 56 year old female who presents today complaining of an irritable facial/neck rash that occurred about 2 weeks ago and has now returned about 1 day ago.  Patient reports possible irritant with cleanser or other hairspray agent, however unsure about specific cause.  Reports her face feels \"like it is on fire, burning/stinging constantly \".  Patient reports using Aveeno cream with no relief followed by topical Goldbond lotion with some relief.  Denies any recent travel or excessive sun exposure.  Denies any new laundry detergents or soaps.  Denies any new medications or recent illnesses.  Reports last known positive Covid was in November/2021.  Reports otherwise feeling well without fatigue or cold symptoms.  Denies any shortness of breath or wheezing.    History obtained from the patient.    Problem List:  2018-04: Lateral epicondylitis of left elbow  2017-05: Lumbar " "radiculopathy  2014: MAYRA II (vulvar intraepithelial neoplasia II)  2012-10: Anxiety  2012: Unexplained endometrial cells on cervical Pap smear  2012: Tubular adenoma  2012: Family history of colon cancer  2012: Family history of coronary artery disease  2011: Adjustment disorder with mixed anxiety and depressed mood  2010-10: CARDIOVASCULAR SCREENING; LDL GOAL LESS THAN 160  2008: Tobacco use disorder      Past Medical History:   Diagnosis Date     Alcohol abuse, in remission      Endometrial cells on cervical Pap smear inconsistent w/LMP 2012    endo bx WNL     MAYRA II (vulvar intraepithelial neoplasia II) 2012       Social History     Tobacco Use     Smoking status: Former Smoker     Packs/day: 0.00     Types: Cigarettes     Quit date: 2019     Years since quittin.6     Smokeless tobacco: Never Used     Tobacco comment: 1 cig/day    Substance Use Topics     Alcohol use: No     Alcohol/week: 0.0 standard drinks     Comment: stopped 2014       Review of Systems   Constitutional: Negative for chills and fever.   Respiratory: Negative for shortness of breath and wheezing.    Skin: Positive for rash.        Face and neck       Vitals:    22 1004   BP: 112/66   BP Location: Right arm   Patient Position: Chair   Cuff Size: Adult Regular   Pulse: 66   Temp: 98  F (36.7  C)   TempSrc: Oral   SpO2: 98%   Weight: 61.2 kg (135 lb)   Height: 1.702 m (5' 7\")       Physical Exam  Constitutional:       Appearance: Normal appearance. She is not ill-appearing or diaphoretic.   Eyes:      General: No scleral icterus.        Right eye: No discharge.         Left eye: No discharge.      Extraocular Movements: Extraocular movements intact.      Conjunctiva/sclera: Conjunctivae normal.      Pupils: Pupils are equal, round, and reactive to light.   Cardiovascular:      Rate and Rhythm: Normal rate and regular rhythm.      Pulses: Normal pulses.      Heart sounds: Normal heart sounds. "   Pulmonary:      Effort: Pulmonary effort is normal.      Breath sounds: Normal breath sounds.   Skin:     General: Skin is warm and dry.      Capillary Refill: Capillary refill takes less than 2 seconds.      Findings: Erythema and rash present.      Comments: Generalized diffused erythremia throughout face and anterior neck, mild warmth, no lesions or drainage noted.  Posterior neck and chest without erythremic appearance   Neurological:      Mental Status: She is alert and oriented to person, place, and time.   Psychiatric:         Behavior: Behavior normal.         Labs:  No results found for any visits on 03/18/22.      At the end of the encounter, I discussed results, diagnosis, medications. Discussed red flags for immediate return to clinic/ER, as well as indications for follow up if no improvement. Patient understood and agreed to plan.     HARI Gray CNP

## 2022-03-18 NOTE — TELEPHONE ENCOUNTER
Called and spoke to patient.    Patient referred to Derm for a rash:  Referring Provider Name: tSorm Becker APRN CNP in  URGENT CARE  Diagnosis and/or Symptoms: Irritant contact dermatitis, unspecified trigger; Rash    Scheduled next available appointment at  skin - 3/22/2022 @8:25am.    Verbalized directions to clinic.    Patient voiced understanding.    CRISS Everett-BSN-PHN  [facility]  [phone]

## 2022-03-18 NOTE — PATIENT INSTRUCTIONS
Patient Education     Understanding Contact Dermatitis     A cool, moist compress can help reduce itching.   Contact dermatitis is a common type of skin rash. It s caused by something that touches the skin and makes it irritated and inflamed. It can occur on skin on any part of the body, such as the face, neck, hands, arms, and legs. Contact dermatitis is not spread from person to person.  Often, the reaction of contact dermatitis occurs 1 to 2 days after contact with the offending agent.    How to say it  LINDA-tact [id number]  What causes contact dermatitis?  It s caused by something that irritates the skin, or that creates an allergic reaction on the skin. People can get contact dermatitis from many kinds of things. These include:     Plant oils in poison ivy, oak, and sumac    Chemicals in household , solvents, and glue    Chemicals in makeup, soap, laundry detergent, perfume, acne cream, and hair products    Certain medicines, such as neomycin, bacitracin, benzocaine, and thimerosal    Metals such as nickel, found in some jewelry and watch bands     The sticky material on the back of bandages and tape (adhesive)    Things that can cause tiny breaks in the skin, such as wood, fiberglass, metal tools, and plant thorns    Rubber latex in surgical gloves and other medical supplies  Dermatitis can also be caused by the skin being damp for long periods of time. This can happen from washing your hands too often, or working with wet materials.   Symptoms of contact dermatitis  Symptoms can include skin that is:    Blistered    Burning    Cracked    Dry    Itchy    Painful    Red    Rough, thickened, and leathery    Swollen    Warm  The blisters may ooze fluid and form crusts.  Treatment for contact dermatitis  Treatment is done to help relieve itching and reduce inflammation. The rash should go away in a few days to a few weeks. Treatments include:     Cool, moist compress. Use a clean damp cloth. Put it  on the area for 20 to 30 minutes, 5 to 6 times a day for the first 3 days.    Steroid cream or ointment. You can apply this medicine several times a day on clean skin.    Oral corticosteroid. Your healthcare provider may prescribe this medicine if you have severe skin symptoms on a large part of your body.  Your healthcare provider may give you a steroid injection instead of pills.    Oral antihistamine. This medicine can help reduce itching.    Colloidal oatmeal bath. Soaking in water with colloidal oatmeal can help soothe skin.    Plain cream, lotion, or ointment. Cream, lotion, or ointment without medicine can help to soothe and protect your skin.  Living with contact dermatitis  Talk with your healthcare provider about what may have caused your contact dermatitis. Patch testing may help you figure out what caused the rash so you can avoid further contact with it. Once you learn what caused your rash, make sure to avoid that substance. If your skin comes into contact with it again, make sure to wash your skin right away. If you can t avoid the substance, wear gloves or other protective clothing before you touch it. Wash the clothing you were wearing. This is because the substance you are allergic to may stay on them until it's washed off. Pets can also pass on allergens such as poison ivy from the plant to your skin. Bathe pets if you suspect they have been in contact. Or use a cream, lotion, or ointment to protect your skin.   When to call your healthcare provider  Call your healthcare provider right away if you have any of these:    Fever of 100.4 F (38 C) or higher, or as directed by your healthcare provider    Symptoms that don t get better, or get worse    New symptoms  [employer] last reviewed this educational content on 6/1/2019 2000-2021 [employer]. All rights reserved. This information is not intended as a substitute for professional medical care. Always follow your healthcare  professional's instructions.

## 2022-03-21 NOTE — PROGRESS NOTES
[facility] Dermatology Clinic Note - EP    Encounter Date: Mar 22, 2022    Dermatology Problem List:  #. Suspected allergic vs irritant contact dermatitis  - Dermatoallergology referral  - Desonide 0.05% cream BID to face    ____________________________________________    Assessment & Plan:     #. Suspected allergic vs irritant contact dermatitis - possibly airborne or photoinduced  - Dermatoallergology referral  - Desonide 0.05% cream BID to face  - Keep topicals to an absolute minimum    Procedures Performed:   None    Follow-up: PRN pending dermatoallergology referral    Romero Cash MD  Dermatology/Dermatopathology Staff Physician  , Department of Dermatology    ____________________________________________    CC: Derm Problem (rash- almost gone. started 3 weeks ago with red burning itching face- tx with prednisone from )      HPI:  Ms. Evangelina De Anda is a(n) extremely pleasant 56 year old female who presents today as a new patient for a rash. Previously seen by urgent care on 3/18/22 at which point she was started on methylprednisolone for treatment of a red, dry, and burning rash.     She notes 3 weeks ago having initial onset of a burning, red, itchy red facial rash for the first tie. She stopped all facial products, and that seemed to help. Two weeks later, she had an unexpected recurrence, and went to urgent care where they prescribed prednisone, which has helped greatly. She uses Aveeno and Gold Bond healing face creams    She wonders if it may be similar to razor burn.       The patient denies any painful, bleeding, or nonhealing lesions, or any new or changing moles.    Patient is otherwise feeling well, without additional skin concerns.     Labs Reviewed:  N/A    Physical Exam:  Vitals: /62   LMP 08/28/2019 (Approximate)   SKIN: Sun-exposed skin, which includes the head/face, neck, both arms, digits, and/or nails was examined.   - Eczematous papules coalescent into thin  plaques on the face, with sparing of the nasolabial folds and submental sparing; focal crusting on the lip  - No other lesions of concern on areas examined.     Medications:  Current Outpatient Medications   Medication     desonide (DESOWEN) 0.05 % external cream     emollient (VANICREAM) external cream     methylPREDNISolone (MEDROL DOSEPAK) 4 MG tablet therapy pack     progesterone (PROMETRIUM) 200 MG capsule     spironolactone (ALDACTONE) 50 MG tablet     No current facility-administered medications for this visit.      Past Medical History:   Patient Active Problem List   Diagnosis     Tobacco use disorder     Adjustment disorder with mixed anxiety and depressed mood     Family history of colon cancer     Family history of coronary artery disease     Tubular adenoma     Anxiety     Unexplained endometrial cells on cervical Pap smear     MAYRA II (vulvar intraepithelial neoplasia II)     Lateral epicondylitis of left elbow     Past Medical History:   Diagnosis Date     Alcohol abuse, in remission      Endometrial cells on cervical Pap smear inconsistent w/LMP 9/2012    endo bx WNL     MAYRA II (vulvar intraepithelial neoplasia II) 12/2012       CC No referring provider defined for this encounter. on close of this encounter.    This note has been created using voice recognition software; while it has been reviewed, some errors may persist.

## 2022-03-22 ENCOUNTER — OFFICE VISIT (OUTPATIENT)
Dept: FAMILY MEDICINE | Facility: CLINIC | Age: 57
End: 2022-03-22
Payer: COMMERCIAL

## 2022-03-22 VITALS — DIASTOLIC BLOOD PRESSURE: 62 MMHG | SYSTOLIC BLOOD PRESSURE: 100 MMHG

## 2022-03-22 DIAGNOSIS — L25.9 CONTACT DERMATITIS, UNSPECIFIED CONTACT DERMATITIS TYPE, UNSPECIFIED TRIGGER: Primary | ICD-10-CM

## 2022-03-22 PROCEDURE — 99203 OFFICE O/P NEW LOW 30 MIN: CPT | Performed by: DERMATOLOGY

## 2022-03-22 RX ORDER — SPIRONOLACTONE 50 MG/1
50 TABLET, FILM COATED ORAL DAILY
COMMUNITY
Start: 2022-01-10 | End: 2022-11-23

## 2022-03-22 RX ORDER — DESONIDE 0.5 MG/G
CREAM TOPICAL 2 TIMES DAILY
Qty: 60 G | Refills: 3 | Status: SHIPPED | OUTPATIENT
Start: 2022-03-22 | End: 2024-07-23

## 2022-03-22 NOTE — LETTER
3/22/2022         RE: Evangelina De Anda  [id number] [place]  [facility] [address]        Dear Colleague,    Thank you for referring your patient, Evangelina De Anda, to the [facility]. Please see a copy of my visit note below.    [facility] Dermatology Clinic Note - EP    Encounter Date: Mar 22, 2022    Dermatology Problem List:  #. Suspected allergic vs irritant contact dermatitis  - Dermatoallergology referral  - Desonide 0.05% cream BID to face    ____________________________________________    Assessment & Plan:     #. Suspected allergic vs irritant contact dermatitis - possibly airborne or photoinduced  - Dermatoallergology referral  - Desonide 0.05% cream BID to face  - Keep topicals to an absolute minimum    Procedures Performed:   None    Follow-up: PRN pending dermatoallergology referral    Romero Cash MD  Dermatology/Dermatopathology Staff Physician  , Department of Dermatology    ____________________________________________    CC: Derm Problem (rash- almost gone. started 3 weeks ago with red burning itching face- tx with prednisone from )      HPI:  Ms. Evangelina De Anda is a(n) extremely pleasant 56 year old female who presents today as a new patient for a rash. Previously seen by urgent care on 3/18/22 at which point she was started on methylprednisolone for treatment of a red, dry, and burning rash.     She notes 3 weeks ago having initial onset of a burning, red, itchy red facial rash for the first tie. She stopped all facial products, and that seemed to help. Two weeks later, she had an unexpected recurrence, and went to urgent care where they prescribed prednisone, which has helped greatly. She uses Aveeno and Gold Bond healing face creams    She wonders if it may be similar to razor burn.       The patient denies any painful, bleeding, or nonhealing lesions, or any new or changing moles.    Patient is otherwise feeling well, without additional  skin concerns.     Labs Reviewed:  N/A    Physical Exam:  Vitals: /62   LMP 08/28/2019 (Approximate)   SKIN: Sun-exposed skin, which includes the head/face, neck, both arms, digits, and/or nails was examined.   - Eczematous papules coalescent into thin plaques on the face, with sparing of the nasolabial folds and submental sparing; focal crusting on the lip  - No other lesions of concern on areas examined.     Medications:  Current Outpatient Medications   Medication     desonide (DESOWEN) 0.05 % external cream     emollient (VANICREAM) external cream     methylPREDNISolone (MEDROL DOSEPAK) 4 MG tablet therapy pack     progesterone (PROMETRIUM) 200 MG capsule     spironolactone (ALDACTONE) 50 MG tablet     No current facility-administered medications for this visit.      Past Medical History:   Patient Active Problem List   Diagnosis     Tobacco use disorder     Adjustment disorder with mixed anxiety and depressed mood     Family history of colon cancer     Family history of coronary artery disease     Tubular adenoma     Anxiety     Unexplained endometrial cells on cervical Pap smear     MAYRA II (vulvar intraepithelial neoplasia II)     Lateral epicondylitis of left elbow     Past Medical History:   Diagnosis Date     Alcohol abuse, in remission      Endometrial cells on cervical Pap smear inconsistent w/LMP 9/2012    endo bx WNL     MAYRA II (vulvar intraepithelial neoplasia II) 12/2012       CC No referring provider defined for this encounter. on close of this encounter.    This note has been created using voice recognition software; while it has been reviewed, some errors may persist.         Again, thank you for allowing me to participate in the care of your patient.        Sincerely,        Romero Cash MD

## 2022-04-08 NOTE — TELEPHONE ENCOUNTER
FUTURE VISIT INFORMATION      FUTURE VISIT INFORMATION:    Date: 8.2.22    Time: 7:00    [place] [facility] [place]  REFERRAL INFORMATION:    Referring provider:  Shreya    Referring providers clinic:  Derm    Reason for visit/diagnosis  Patch Testing consult - Recs in [facility] - no outside Recs - Per Pt Evangelina    RECORDS REQUESTED FROM:       Clinic name Comments Records Status   Derm 3.22.22  [facility]rey[facility] 3.18.22  [facility]

## 2022-04-10 ENCOUNTER — HEALTH MAINTENANCE LETTER (OUTPATIENT)
Age: 57
End: 2022-04-10

## 2022-06-01 LAB — PAP SMEAR - HIM PATIENT REPORTED: NEGATIVE

## 2022-06-09 ENCOUNTER — TRANSFERRED RECORDS (OUTPATIENT)
Dept: HEALTH INFORMATION MANAGEMENT | Facility: CLINIC | Age: 57
End: 2022-06-09
Payer: COMMERCIAL

## 2022-08-02 ENCOUNTER — PRE VISIT (OUTPATIENT)
Dept: ALLERGY | Facility: CLINIC | Age: 57
End: 2022-08-02

## 2022-09-23 ENCOUNTER — TRANSFERRED RECORDS (OUTPATIENT)
Dept: HEALTH INFORMATION MANAGEMENT | Facility: CLINIC | Age: 57
End: 2022-09-23

## 2022-10-01 ENCOUNTER — TRANSFERRED RECORDS (OUTPATIENT)
Dept: MULTI SPECIALTY CLINIC | Facility: CLINIC | Age: 57
End: 2022-10-01

## 2022-10-16 ENCOUNTER — HEALTH MAINTENANCE LETTER (OUTPATIENT)
Age: 57
End: 2022-10-16

## 2022-11-22 ENCOUNTER — TELEPHONE (OUTPATIENT)
Dept: NURSING | Facility: CLINIC | Age: 57
End: 2022-11-22

## 2022-11-22 ENCOUNTER — NURSE TRIAGE (OUTPATIENT)
Dept: NURSING | Facility: CLINIC | Age: 57
End: 2022-11-22

## 2022-11-22 NOTE — TELEPHONE ENCOUNTER
"Nurse Triage SBAR    Is this a 2nd Level Triage? NO    Situation: Patient calling to report headaches for 6 weeks. She tried to set up an appointment; triaged first.   Consent: not needed    Background: 6 weeks of daily headaches. Patient reports she thought it was related to her bio-identical hormone therapy, but she had her levels checked and her hormone levels are appropriate she reports.     Assessment:   - Headaches at their worse in the morning, ranging from 5-6/10 when patient wakes up. She reports headaches seem to worsen when laying down flat. Not \"excrutiating\"   - Patient reports headaches improve with activity, but are still present. She states they get worse again when she sits still (example: driving).   - Headache is primarily in back of her head, she reports it feels like pressure, and sometimes moves to left side of head. Reports occasional headache bursts to her left temple.   - Patient is a  and has tried foam rollers and muscle therapy with no improvement. She does report some pain to neck and into shoulder.   - Headaches occur daily.   - Some improvement with OTC medications like Ibuprofen and Tylenol, but headache returns.   - No vomiting, no fever, denies concentrated sinus pain, no loss of vision, no weakness or sensation changes.   - Stated she occasionally has felt nauseated, but wondered if related to the OTC medications.   - Patient had migraines in her 20's for a short time, maybe a few months, but this feels different.  - Reports decrease in quality of life due to headaches, but reports the headaches \"haven't stopped me\"     Protocol Recommended Disposition: See in office today or tomorrow.     Recommendation: Advised to attempt to set up an appt for today or tomorrow, if none available, report to [facility]. Patient advised to set up an appointment for longer term management even after visiting [facility]. Patient agrees to go to [facility] tomorrow morning if no appointments available " today/tomorrow.       Gabrielle Carrasquillo RN, BSN Nurse Triage Advisor 10:11 AM 11/22/2022    Headaches - really bad. For a few weeks - bioidentical hormone therapy - wondered if hormone levels were impacting it. Then had hormones checked, all levels are fine.       Reason for Disposition    Unexplained headache that is present > 24 hours    Additional Information    Negative: Difficult to awaken or acting confused (e.g., disoriented, slurred speech)    Negative: Weakness of the face, arm or leg on one side of the body and new-onset    Negative: Numbness of the face, arm or leg on one side of the body and new-onset    Negative: Loss of speech or garbled speech and new-onset    Negative: Passed out (i.e., fainted, collapsed and was not responding)    Negative: Sounds like a life-threatening emergency to the triager    Negative: Followed a head injury within last 3 days    Negative: Traumatic Brain Injury (TBI) is suspected    Negative: Sinus pain of forehead and yellow or green nasal discharge    Negative: Pregnant    Negative: Unable to walk without falling    Negative: Stiff neck (can't touch chin to chest)    Negative: Possibility of carbon monoxide exposure    Negative: SEVERE headache, states 'worst headache' of life    Negative: SEVERE headache, sudden-onset (i.e., reaching maximum intensity within seconds to 1 hour)    Negative: Severe pain in one eye    Negative: Loss of vision or double vision  (Exception: Same as prior migraines.)    Negative: Patient sounds very sick or weak to the triager    Negative: Fever > 103 F (39.4 C)    Negative: Fever > 100.0 F (37.8 C) and has diabetes mellitus or a weak immune system (e.g., HIV positive, cancer chemotherapy, organ transplant, splenectomy, chronic steroids)    Negative: SEVERE headache and not relieved by pain meds    Negative: SEVERE headache and vomiting    Negative: SEVERE headache (e.g., excruciating) and has had severe headaches before    Negative: SEVERE  headache and fever    Negative: New headache and weak immune system (e.g., HIV positive, cancer chemo, splenectomy, organ transplant, chronic steroids)    Negative: Patient wants to be seen    Negative: Fever present > 3 days (72 hours)    Protocols used: HEADACHE-A-OH

## 2022-11-22 NOTE — TELEPHONE ENCOUNTER
A user error has taken place: encounter opened in error, closed for administrative reasons.    Gabrielle Carrasquillo RN, BSN Nurse Triage Advisor 10:06 AM 11/22/2022

## 2022-11-23 ENCOUNTER — OFFICE VISIT (OUTPATIENT)
Dept: FAMILY MEDICINE | Facility: CLINIC | Age: 57
End: 2022-11-23
Payer: COMMERCIAL

## 2022-11-23 VITALS
SYSTOLIC BLOOD PRESSURE: 100 MMHG | DIASTOLIC BLOOD PRESSURE: 74 MMHG | WEIGHT: 136 LBS | HEART RATE: 86 BPM | RESPIRATION RATE: 20 BRPM | HEIGHT: 67 IN | OXYGEN SATURATION: 97 % | BODY MASS INDEX: 21.35 KG/M2 | TEMPERATURE: 97 F

## 2022-11-23 DIAGNOSIS — G44.52 HEADACHE, NEW DAILY PERSISTENT (NDPH): Primary | ICD-10-CM

## 2022-11-23 PROCEDURE — 99214 OFFICE O/P EST MOD 30 MIN: CPT | Performed by: NURSE PRACTITIONER

## 2022-11-23 ASSESSMENT — ENCOUNTER SYMPTOMS: HEADACHES: 1

## 2022-11-23 NOTE — PROGRESS NOTES
"  Assessment & Plan     Evangelina was seen today for headache.    Diagnoses and all orders for this visit: Likely tension type HA exacerbated by frequent motrin use. However, given persistent status, worsening with position, and onset over age 50- neuro imaging is indicated.     Headache, new daily persistent (NDPH)  - MR Brain w/o Contrast; Future  - Begin HA journal as discussed  - cease ibuprofen use and substitute tylenol.  - Report to ER ASAP if you develop neurological deficits, the headache has a sudden severe 'thunderclap' like onset, you develop vision changes, or have a new onset fever with neck pain.  - Check your carbon monoxide detector as discussed.       Return in about 3 months (around 2/23/2023) for Preventative Visit + Fasting labs.    Jamal Cannon RN, FNP Student     I was present with the student who participated in the service and in the documentation of the note, which I have reviewed and verified. The history, reviews of systems, objective data, and assessment/plan were completed by myself.    Radha Marc, [facility]   Evangelina is a 56 year old, presenting for the following health issues:  Headache    4-6 weeks headaches, every day, 2-6/10 pain wax and wane, motrin gives some relief. Mostly in back of head, sometimes shoots to left temple and left side of face. Sometimes worsening with laying flat. Does not worsen with activity. Pt states she had headaches around perimenopause but these feel different. Previous headaches resolved with initiation of hormone therapy, pt was concerned this may be contributing so she was evaluated by her OB who manages hormone therapy. These labs are not available to review, but pt states \"I was told they were fine and where they needed to be\".     Has tried foam roller, chiro, new pillow, neck traction pillow without effect. Motrin gives minimal relief. Pt has been taking 1000 mg PO once every other day for 'a few " "weeks'.  HA not sensitive to light/sound. No aura. No one else at home is ill.     Headache     History of Present Illness       Headaches:   Since the patient's last clinic visit, headaches are: worsened  The patient is getting headaches:  Daily  She is able to do normal daily activities when she has a migraine.  The patient is taking the following rescue/relief medications:  Ibuprofen (Advil, Motrin)   Patient states \"The relief is inconsistent\" from the rescue/relief medications.   The patient is taking the following medications to prevent migraines:  No medications to prevent migraines  In the past 4 weeks, the patient has gone to an Urgent Care or Emergency Room 0 times times due to headaches.    She eats 4 or more servings of fruits and vegetables daily.She consumes 0 sweetened beverage(s) daily.She exercises with enough effort to increase her heart rate 30 to 60 minutes per day.  She exercises with enough effort to increase her heart rate 6 days per week.   She is taking medications regularly.     Review of Systems   Neurological: Positive for headaches.          Objective    /74   Pulse 86   Temp 97  F (36.1  C) (Tympanic)   Resp 20   Ht 1.702 m (5' 7\")   Wt 61.7 kg (136 lb)   LMP 08/28/2019 (Approximate)   SpO2 97%   BMI 21.30 kg/m    Body mass index is 21.3 kg/m .     Physical Exam     GENERAL: healthy, alert and no distress  NECK: no adenopathy, no asymmetry, masses, or scars and thyroid normal to palpation  RESP: lungs clear to auscultation - no rales, rhonchi or wheezes  CV: regular rate and rhythm, normal S1 S2, no S3 or S4, no murmur, click or rub, no peripheral edema   MS: no gross musculoskeletal defects noted, no edema  Neuro- 5/5 STR to all extrem. Cranial nerves 2-12 intact               "

## 2022-11-23 NOTE — Clinical Note
Please abstract:    [facility] Normal pap smear - June 2022  Normal mammogram in October 2022.

## 2022-11-28 ENCOUNTER — ALLIED HEALTH/NURSE VISIT (OUTPATIENT)
Dept: FAMILY MEDICINE | Facility: CLINIC | Age: 57
End: 2022-11-28
Payer: COMMERCIAL

## 2022-11-28 DIAGNOSIS — Z23 HIGH PRIORITY FOR 2019-NCOV VACCINE: Primary | ICD-10-CM

## 2022-11-28 PROCEDURE — 0124A COVID-19 VACCINE BIVALENT BOOSTER 12+ (PFIZER): CPT

## 2022-11-28 PROCEDURE — 91312 COVID-19 VACCINE BIVALENT BOOSTER 12+ (PFIZER): CPT

## 2022-11-28 PROCEDURE — 90471 IMMUNIZATION ADMIN: CPT

## 2022-11-28 PROCEDURE — 90682 RIV4 VACC RECOMBINANT DNA IM: CPT

## 2022-11-28 PROCEDURE — 99207 PR NO CHARGE NURSE ONLY: CPT

## 2022-12-06 ENCOUNTER — HOSPITAL ENCOUNTER (OUTPATIENT)
Dept: MRI IMAGING | Facility: CLINIC | Age: 57
Discharge: HOME | End: 2022-12-06
Attending: NURSE PRACTITIONER | Admitting: NURSE PRACTITIONER
Payer: COMMERCIAL

## 2022-12-06 DIAGNOSIS — G44.52 HEADACHE, NEW DAILY PERSISTENT (NDPH): ICD-10-CM

## 2022-12-06 PROCEDURE — 70551 MRI BRAIN STEM W/O DYE: CPT

## 2022-12-06 NOTE — RESULT ENCOUNTER NOTE
Dear Evangelina,     Your recent MRI results were overall reassuring.  There was non-specific white matter present, which is suggestive of chronic small vessel changes.      Please let me know if your headaches haven't improved.      Please send a WISETIVI message or call [phone]  if you have any questions.      Radha Marc, HARI, CNP  [facility]

## 2022-12-07 ENCOUNTER — MYC MEDICAL ADVICE (OUTPATIENT)
Dept: FAMILY MEDICINE | Facility: CLINIC | Age: 57
End: 2022-12-07

## 2022-12-07 DIAGNOSIS — G44.52 HEADACHE, NEW DAILY PERSISTENT (NDPH): Primary | ICD-10-CM

## 2022-12-07 DIAGNOSIS — M54.2 CERVICALGIA: ICD-10-CM

## 2022-12-07 NOTE — TELEPHONE ENCOUNTER
Please see my chart message below     Please review and advise     Thank you     Karen Claros RN, BSN  [facility]

## 2022-12-08 NOTE — TELEPHONE ENCOUNTER
Called patient to follow-up.     No noted improvement in headaches.     Left side of neck/shoulder - feels pressure.  Sternal region - has an uncomfortable feeling there as well, which is intermittent.      Plan referral to physical therapy and Head & Neck Pain Specialist.  Referrals placed.      -Liana, CNP

## 2022-12-13 ENCOUNTER — THERAPY VISIT (OUTPATIENT)
Dept: PHYSICAL THERAPY | Facility: CLINIC | Age: 57
End: 2022-12-13
Attending: NURSE PRACTITIONER
Payer: COMMERCIAL

## 2022-12-13 DIAGNOSIS — M54.2 CERVICALGIA: ICD-10-CM

## 2022-12-13 DIAGNOSIS — G44.52 HEADACHE, NEW DAILY PERSISTENT (NDPH): ICD-10-CM

## 2022-12-13 PROCEDURE — 97110 THERAPEUTIC EXERCISES: CPT | Mod: GP | Performed by: PHYSICAL THERAPIST

## 2022-12-13 PROCEDURE — 97161 PT EVAL LOW COMPLEX 20 MIN: CPT | Mod: GP | Performed by: PHYSICAL THERAPIST

## 2022-12-13 NOTE — PROGRESS NOTES
Physical Therapy Initial Evaluation  Subjective:  Onset of headaches 8 weeks ago of unknown etiology. Pt referred by MD for physical therapy on 12-8-22      Therapist Generated HPI Evaluation         Type of problem:  Cervical spine.    This is a new condition.  Condition occurred with:  Insidious onset.  Where condition occurred: for unknown reasons.  Patient reports pain:  Cervical left side.  Pain quality: pressure  and is intermittent.  Radiates to: occipital/ temporal headaches  Pain is worse in the A.M..  Since onset symptoms are unchanged.  Associated symptoms:  Headache and loss of motion/stiffness. Exacerbated by: reading, laying down without proper pillow support   and relieved by NSAID's, analgesics and rest.  Special tests included:  MRI (see report).  Previous treatment includes chiropractic. There was moderate improvement following previous treatment.  Restrictions due to condition include:  Working in normal job without restrictions.  Barriers include:  None as reported by patient.                        Objective:  Standing Alignment:    Cervical/thoracic deviations alignment: forward head and shoulder posture.                    Flexibility/Screens:         Spine:  Decreased left spine flexibility:  Levator                    Cervical/Thoracic Evaluation    AROM:  AROM Cervical:    Flexion:            Minimal loss   Extension:       Minimal loss left cervical pain   Rotation:         Left: minimal loss      Right: minimal loss   Side Bend:      Left: moderate loss      Right:  Moderate loss     Strength: over active upper trapezius, weak rhomboids, mid/lower trapezius  Headaches: cervical (occipital/temporal )  Cervical Myotomes:  normal                  DTR's:  normal          Cervical Dermatomes:  normal                    Cervical Palpation:  : increased muscle tone sub occipital musculature.          Spinal Segmental Conclusions:  Hypomobility to extension T1-T8                                                   General     ROS    Assessment/Plan:    Patient is a 56 year old female with cervical complaints.    Patient has the following significant findings with corresponding treatment plan.                Diagnosis 1:  Left cervical pain/ headaches   Pain -  hot/cold therapy, manual therapy, self management, education and home program  Decreased ROM/flexibility - manual therapy, therapeutic exercise, therapeutic activity and home program  Decreased strength - therapeutic exercise, therapeutic activities and home program    Therapy Evaluation Codes:   1) History comprised of:   Personal factors that impact the plan of care:      None.    Comorbidity factors that impact the plan of care are:      None.     Medications impacting care: hormone replacement.  2) Examination of Body Systems comprised of:   Body structures and functions that impact the plan of care:      Cervical spine.   Activity limitations that impact the plan of care are:      Lifting, Reading/Computer work, Sleeping and Laying down.  3) Clinical presentation characteristics are:   Stable/Uncomplicated.  4) Decision-Making    Low complexity using standardized patient assessment instrument and/or measureable assessment of functional outcome.  Cumulative Therapy Evaluation is: Low complexity.    Previous and current functional limitations:  (See Goal Flow Sheet for this information)    Short term and Long term goals: (See Goal Flow Sheet for this information)     Communication ability:  Patient appears to be able to clearly communicate and understand verbal and written communication and follow directions correctly.  Treatment Explanation - The following has been discussed with the patient:   RX ordered/plan of care  Anticipated outcomes  Possible risks and side effects  This patient would benefit from PT intervention to resume normal activities.   Rehab potential is good.    Frequency:  1 X week, once daily  Duration:  for 6 weeks  Discharge Plan:   Achieve all LTG.  Independent in home treatment program.  Reach maximal therapeutic benefit.    Please refer to the daily flowsheet for treatment today, total treatment time and time spent performing 1:1 timed codes.

## 2022-12-13 NOTE — PROGRESS NOTES
[facility]    OUTPATIENT Physical Therapy ORTHOPEDIC EVALUATION  PLAN OF TREATMENT FOR OUTPATIENT REHABILITATION  (COMPLETE FOR INITIAL CLAIMS ONLY)  Patient's Last Name, First Name, M.I.  YOB: 1965  Evangelina De Anda    Provider s Name:  [facility]   Medical Record No.  [id number]   Start of Care Date:  12/13/22   Onset Date:  12/08/22    Treatment Diagnosis:  left cervical pain/ headaches Medical Diagnosis:     Headache, new daily persistent (NDPH)  Cervicalgia       Goals:     12/13/22 0500   Body Part   Goals listed below are for cervical   Goal #1   Goal #1 headaches   Previous Functional Level No headaches   Current Functional Level Headache frequency per week is   Performance Level 7   STG Target Performance Reduce frequency of headaches in a week to   Performance Level 4   Rationale for full and safe concentration;to establish restorative sleep pattern;to improve quality of life and resume normal social activities;to reduce missed time at work   Due Date 01/12/23   LTG Target Performance Reduce frequency of headaches in a week to   Performance Level 1   Rationale for full and safe concentration;to establish restorative sleep pattern;to improve quality of life and resume normal social activities;to reduce missed time at work   Due Date 02/14/23       Therapy Frequency:  1x/week  Predicted Duration of Therapy Intervention:  6 weeks    Dez Howell, PT                 I CERTIFY THE NEED FOR THESE SERVICES FURNISHED UNDER        THIS PLAN OF TREATMENT AND WHILE UNDER MY CARE     (Physician attestation of this document indicates review and certification of the therapy plan).                     Certification Date From:  12/13/22   Certification Date To:  03/12/23    Referring Provider:  Radha Marc    Initial Assessment        See Epic Evaluation SOC Date:  12/13/22

## 2023-03-15 ENCOUNTER — TELEPHONE (OUTPATIENT)
Dept: FAMILY MEDICINE | Facility: CLINIC | Age: 58
End: 2023-03-15

## 2023-03-15 NOTE — TELEPHONE ENCOUNTER
Reason for Call:  Form, our goal is to have forms completed with 72 hours, however, some forms may require a visit or additional information.    Type of letter, form or note:  Plan of care    Who is the form from?: [facility] (if other please explain)    Where did the form come from: form was faxed in    What clinic location was the form placed at?: [facility]    Where the form was placed: Radha Marc Box/Folder    What number is listed as a contact on the form?: [phone] (fax)    Call taken on 3/15/2023 at 1:15 PM by Shanon Waters

## 2023-03-16 ENCOUNTER — TRANSFERRED RECORDS (OUTPATIENT)
Dept: HEALTH INFORMATION MANAGEMENT | Facility: CLINIC | Age: 58
End: 2023-03-16

## 2023-03-16 NOTE — TELEPHONE ENCOUNTER
[facility] form placed at [facility]'s desk [facility] to complete. Yin Trivedi CMA

## 2023-04-06 ENCOUNTER — TRANSFERRED RECORDS (OUTPATIENT)
Dept: HEALTH INFORMATION MANAGEMENT | Facility: CLINIC | Age: 58
End: 2023-04-06

## 2023-04-11 ENCOUNTER — TRANSFERRED RECORDS (OUTPATIENT)
Dept: HEALTH INFORMATION MANAGEMENT | Facility: CLINIC | Age: 58
End: 2023-04-11

## 2023-04-20 ENCOUNTER — PATIENT OUTREACH (OUTPATIENT)
Dept: CARE COORDINATION | Facility: CLINIC | Age: 58
End: 2023-04-20

## 2023-06-01 ENCOUNTER — HEALTH MAINTENANCE LETTER (OUTPATIENT)
Age: 58
End: 2023-06-01

## 2023-09-25 ENCOUNTER — PATIENT OUTREACH (OUTPATIENT)
Dept: CARE COORDINATION | Facility: CLINIC | Age: 58
End: 2023-09-25

## 2023-10-23 ENCOUNTER — PATIENT OUTREACH (OUTPATIENT)
Dept: CARE COORDINATION | Facility: CLINIC | Age: 58
End: 2023-10-23

## 2023-11-18 ENCOUNTER — ANCILLARY PROCEDURE (OUTPATIENT)
Dept: MAMMOGRAPHY | Facility: CLINIC | Age: 58
End: 2023-11-18
Attending: NURSE PRACTITIONER
Payer: COMMERCIAL

## 2023-11-18 DIAGNOSIS — Z12.31 VISIT FOR SCREENING MAMMOGRAM: ICD-10-CM

## 2023-11-18 PROCEDURE — 77067 SCR MAMMO BI INCL CAD: CPT | Mod: TC | Performed by: RADIOLOGY

## 2023-11-18 PROCEDURE — 77063 BREAST TOMOSYNTHESIS BI: CPT | Mod: TC | Performed by: RADIOLOGY

## 2023-11-21 ENCOUNTER — OFFICE VISIT (OUTPATIENT)
Dept: FAMILY MEDICINE | Facility: CLINIC | Age: 58
End: 2023-11-21
Payer: COMMERCIAL

## 2023-11-21 VITALS
HEIGHT: 67 IN | DIASTOLIC BLOOD PRESSURE: 70 MMHG | HEART RATE: 68 BPM | TEMPERATURE: 98.2 F | BODY MASS INDEX: 21.5 KG/M2 | SYSTOLIC BLOOD PRESSURE: 104 MMHG | RESPIRATION RATE: 16 BRPM | OXYGEN SATURATION: 99 % | WEIGHT: 137 LBS

## 2023-11-21 DIAGNOSIS — Z13.1 SCREENING FOR DIABETES MELLITUS: ICD-10-CM

## 2023-11-21 DIAGNOSIS — Z13.29 THYROID DISORDER SCREENING: ICD-10-CM

## 2023-11-21 DIAGNOSIS — L30.9 DERMATITIS: Primary | ICD-10-CM

## 2023-11-21 DIAGNOSIS — Z13.0 SCREENING FOR DEFICIENCY ANEMIA: ICD-10-CM

## 2023-11-21 LAB
ERYTHROCYTE [DISTWIDTH] IN BLOOD BY AUTOMATED COUNT: 12.6 % (ref 10–15)
HCT VFR BLD AUTO: 41.5 % (ref 35–47)
HGB BLD-MCNC: 13.8 G/DL (ref 11.7–15.7)
MCH RBC QN AUTO: 29.8 PG (ref 26.5–33)
MCHC RBC AUTO-ENTMCNC: 33.3 G/DL (ref 31.5–36.5)
MCV RBC AUTO: 90 FL (ref 78–100)
PLATELET # BLD AUTO: 262 10E3/UL (ref 150–450)
RBC # BLD AUTO: 4.63 10E6/UL (ref 3.8–5.2)
WBC # BLD AUTO: 8.2 10E3/UL (ref 4–11)

## 2023-11-21 PROCEDURE — 99213 OFFICE O/P EST LOW 20 MIN: CPT | Performed by: NURSE PRACTITIONER

## 2023-11-21 PROCEDURE — 80053 COMPREHEN METABOLIC PANEL: CPT | Performed by: NURSE PRACTITIONER

## 2023-11-21 PROCEDURE — 82728 ASSAY OF FERRITIN: CPT | Performed by: NURSE PRACTITIONER

## 2023-11-21 PROCEDURE — 36415 COLL VENOUS BLD VENIPUNCTURE: CPT | Performed by: NURSE PRACTITIONER

## 2023-11-21 PROCEDURE — 84443 ASSAY THYROID STIM HORMONE: CPT | Performed by: NURSE PRACTITIONER

## 2023-11-21 PROCEDURE — 85027 COMPLETE CBC AUTOMATED: CPT | Performed by: NURSE PRACTITIONER

## 2023-11-21 RX ORDER — PROGESTERONE 100 MG/1
100 CAPSULE ORAL AT BEDTIME
COMMUNITY
Start: 2023-08-25

## 2023-11-21 RX ORDER — ESTRADIOL 1 MG/1
1 TABLET ORAL
COMMUNITY
Start: 2023-10-28

## 2023-11-21 RX ORDER — TRIAMCINOLONE ACETONIDE 0.25 MG/G
OINTMENT TOPICAL 2 TIMES DAILY
Qty: 80 G | Refills: 1 | Status: SHIPPED | OUTPATIENT
Start: 2023-11-21

## 2023-11-21 NOTE — PROGRESS NOTES
"  Assessment & Plan     Dermatitis  Patient presents with an acute onset of a facial spot on the right jaw line and sandpaper rash most prominent to the back.     - Adult Dermatology  Referral  - triamcinolone (KENALOG) 0.025 % external ointment  Dispense: 80 g; Refill: 1  -Encouraged continue use of an emollient cream (Vanicream) to back    Screening for deficiency anemia  - CBC with platelets  - Ferritin  - CBC with platelets  - Ferritin    Thyroid disorder screening  - TSH with free T4 reflex  - TSH with free T4 reflex    Screening for diabetes mellitus  - Comprehensive metabolic panel (BMP + Alb, Alk Phos, ALT, AST, Total. Bili, TP)  - Comprehensive metabolic panel (BMP + Alb, Alk Phos, ALT, AST, Total. Bili, TP)      Prescription drug management             Holli Hoang [facility]   Evangelina is a 57 year old, presenting for the following health issues:  Derm Problem        11/21/2023     1:44 PM   Additional Questions   Roomed by Sujata PARKS CMA       History of Present Illness       Reason for visit:  Skin Irritation  Symptom onset:  More than a month  Symptoms include:  Rough Itch skin  Symptom intensity:  Moderate  Symptom progression:  Staying the same  Had these symptoms before:  No  What makes it worse:  No  What makes it better:  No    She eats 2-3 servings of fruits and vegetables daily.She consumes 0 sweetened beverage(s) daily.She exercises with enough effort to increase her heart rate 30 to 60 minutes per day.  She exercises with enough effort to increase her heart rate 5 days per week.      Dry sandpaper like rash on back for about 6 weeks.     Facial spot to right jaw line resolved 1 1/2- 2 weeks ago.               Review of Systems   Constitutional, HEENT, cardiovascular, pulmonary, gi and gu systems are negative, except as otherwise noted.      Objective    /70   Pulse 68   Temp 98.2  F (36.8  C) (Tympanic)   Resp 16   Ht 1.702 m (5' 7\")   " Wt 62.1 kg (137 lb)   LMP 05/15/2019   SpO2 99%   BMI 21.46 kg/m    Body mass index is 21.46 kg/m .    Physical Exam   GENERAL: healthy, alert and no distress  SKIN: dry, sandpaper rash most prominent to the back  PSYCH: mentation appears normal, affect normal/bright                Holli JOHNSON CNP was present with the [facility] medical/ROE student who participated in the service and in the documentation of the note.  I have verified the history and personally performed the physical exam and medical decision making.  I agree with the assessment and plan of care as documented in the note.

## 2023-11-22 LAB
ALBUMIN SERPL BCG-MCNC: 4.7 G/DL (ref 3.5–5.2)
ALP SERPL-CCNC: 59 U/L (ref 40–150)
ALT SERPL W P-5'-P-CCNC: 25 U/L (ref 0–50)
ANION GAP SERPL CALCULATED.3IONS-SCNC: 12 MMOL/L (ref 7–15)
AST SERPL W P-5'-P-CCNC: 26 U/L (ref 0–45)
BILIRUB SERPL-MCNC: 0.7 MG/DL
BUN SERPL-MCNC: 27.6 MG/DL (ref 6–20)
CALCIUM SERPL-MCNC: 10 MG/DL (ref 8.6–10)
CHLORIDE SERPL-SCNC: 104 MMOL/L (ref 98–107)
CREAT SERPL-MCNC: 1.07 MG/DL (ref 0.51–0.95)
DEPRECATED HCO3 PLAS-SCNC: 25 MMOL/L (ref 22–29)
EGFRCR SERPLBLD CKD-EPI 2021: 60 ML/MIN/1.73M2
FERRITIN SERPL-MCNC: 110 NG/ML (ref 11–328)
GLUCOSE SERPL-MCNC: 82 MG/DL (ref 70–99)
POTASSIUM SERPL-SCNC: 4.3 MMOL/L (ref 3.4–5.3)
PROT SERPL-MCNC: 7.4 G/DL (ref 6.4–8.3)
SODIUM SERPL-SCNC: 141 MMOL/L (ref 135–145)
TSH SERPL DL<=0.005 MIU/L-ACNC: 1.66 UIU/ML (ref 0.3–4.2)

## 2024-01-12 ENCOUNTER — TRANSFERRED RECORDS (OUTPATIENT)
Dept: HEALTH INFORMATION MANAGEMENT | Facility: CLINIC | Age: 59
End: 2024-01-12
Payer: COMMERCIAL

## 2024-01-14 ENCOUNTER — MYC MEDICAL ADVICE (OUTPATIENT)
Dept: FAMILY MEDICINE | Facility: CLINIC | Age: 59
End: 2024-01-14
Payer: COMMERCIAL

## 2024-01-14 DIAGNOSIS — R94.4 DECREASED GFR: Primary | ICD-10-CM

## 2024-01-15 NOTE — TELEPHONE ENCOUNTER
Should patient set up lab appointment to recheck values (will need lab orders), or follow up in person?    Thank you,  Jaxson Holley, Triage RN [facility]  10:24 AM 1/15/2024

## 2024-01-16 NOTE — TELEPHONE ENCOUNTER
Lab appointment only. Very mildly decreased. Made sure well hydrated when comes in.    Holli Hoang, CNP

## 2024-01-18 NOTE — TELEPHONE ENCOUNTER
Left detailed message on patient's phone per consent advising of Holli Hoang's message below. Writer will also sent [employer] message as well.

## 2024-01-29 ENCOUNTER — OFFICE VISIT (OUTPATIENT)
Dept: FAMILY MEDICINE | Facility: CLINIC | Age: 59
End: 2024-01-29
Payer: COMMERCIAL

## 2024-01-29 DIAGNOSIS — L30.9 DERMATITIS: ICD-10-CM

## 2024-01-29 PROCEDURE — 99213 OFFICE O/P EST LOW 20 MIN: CPT | Performed by: PHYSICIAN ASSISTANT

## 2024-01-29 ASSESSMENT — PAIN SCALES - GENERAL: PAINLEVEL: NO PAIN (0)

## 2024-01-29 NOTE — LETTER
1/29/2024         RE: Evangelina De Anda  [id number]  [place] [address]        Dear Colleague,    Thank you for referring your patient, Evangelina De Anda, to the [facility]. Please see a copy of my visit note below.    [facility] Dermatology Note  Encounter Date: Jan 29, 2024  Office Visit      Dermatology Problem List:  1. Suspected allergic vs irritant contact dermatitis  - Desonide 0.05% cream BID to face, triam 0.025% oint    Social:   ____________________________________________    Assessment & Plan:  # Suspected dermatitis - ACD vs irritant contact derm - no rash present on exam today - cosmecueticals/shampoo/hair care/ etc suspected  - dermatoallergy referral placed    # Lentigines - face   - Sunscreen: Apply 20 minutes prior to going outdoors and reapply every two hours, when wet or sweating. We recommend using an SPF 30 or higher, and to use one that is water resistant.     - Advised to monitor for changing, non-healing, bleeding, painful, changing, or otherwise symptomatic lesions    # SK - thigh  - reassured of benign etiology    Procedures Performed:   none     Follow-up: prn for new or changing lesions    Staff and scribe     Scribe Disclosure:   I, SARAH PAREDES, am serving as a scribe; to document services personally performed by Odalis Guerrero PA-C -based on data collection and the provider's statements to me.     Provider Disclosure:  I agree with above History, Review of Systems, Physical exam and Plan.  I have reviewed the content of the documentation and have edited it as needed. I have personally performed the services documented here and the documentation accurately represents those services and the decisions I have made.      Electronically signed by:    All risks, benefits and alternatives were discussed with patient.  Patient is in agreement and understands the assessment and plan.  All questions were  answered.    Odalis Guerrero PA-C, MPAS  [facility] [place]: Phone: [phone], Fax: [phone]  [facility]: Phone: [phone],  Fax: [phone]  [facility] [place]: Phone: [phone], Fax: [phone]  ____________________________________________    CC: No chief complaint on file.      Reviewed patients past medical history and pertinent chart review prior to patient's visit today.     HPI:  Ms. Evangelina De Anda is a 58 year old female who presents today as a return patient for rash evaluation.     Patient is otherwise feeling well, without additional concerns.    Labs:  N/A    Physical Exam:  Vitals: LMP 05/15/2019   SKIN: Sun-exposed skin, which includes the head/face, neck, both arms, digits, and/or nails was examined.    - no active rash  - Scattered brown macules on sun exposed areas.=  - There are waxy stuck on tan to brown papules on the legs..   - No other lesions of concern on areas examined.     Medications:  Current Outpatient Medications   Medication     desonide (DESOWEN) 0.05 % external cream     emollient (VANICREAM) external cream     estradiol (ESTRACE) 1 MG tablet     progesterone (PROMETRIUM) 100 MG capsule     triamcinolone (KENALOG) 0.025 % external ointment     No current facility-administered medications for this visit.      Past Medical/Surgical History:   Patient Active Problem List   Diagnosis     Tobacco use disorder     Adjustment disorder with mixed anxiety and depressed mood     Family history of colon cancer     Family history of coronary artery disease     Tubular adenoma     Anxiety     Unexplained endometrial cells on cervical Pap smear     MAYRA II (vulvar intraepithelial neoplasia II)     Lateral epicondylitis of left elbow     Headache, new daily persistent ([facility])     Cervicalgia     Past Medical History:   Diagnosis Date     Alcohol abuse, in remission      Endometrial  cells on cervical Pap smear inconsistent w/LMP 9/2012    endo bx WNL     MAYRA II (vulvar intraepithelial neoplasia II) 12/2012                        Again, thank you for allowing me to participate in the care of your patient.        Sincerely,        Odalis Guerrero PA-C

## 2024-01-29 NOTE — PROGRESS NOTES
[facility] Dermatology Note  Encounter Date: Jan 29, 2024  Office Visit      Dermatology Problem List:  1. Suspected allergic vs irritant contact dermatitis  - Desonide 0.05% cream BID to face, triam 0.025% oint    Social:   ____________________________________________    Assessment & Plan:  # Suspected dermatitis - ACD vs irritant contact derm - no rash present on exam today - cosmecueticals/shampoo/hair care/ etc suspected  - dermatoallergy referral placed    # Lentigines - face   - Sunscreen: Apply 20 minutes prior to going outdoors and reapply every two hours, when wet or sweating. We recommend using an SPF 30 or higher, and to use one that is water resistant.     - Advised to monitor for changing, non-healing, bleeding, painful, changing, or otherwise symptomatic lesions    # SK - thigh  - reassured of benign etiology    Procedures Performed:   none     Follow-up: prn for new or changing lesions    Staff and scribe     Scribe Disclosure:   I, SARAH PAREDES, am serving as a scribe; to document services personally performed by Odalis Guerrero PA-C -based on data collection and the provider's statements to me.     Provider Disclosure:  I agree with above History, Review of Systems, Physical exam and Plan.  I have reviewed the content of the documentation and have edited it as needed. I have personally performed the services documented here and the documentation accurately represents those services and the decisions I have made.      Electronically signed by:    All risks, benefits and alternatives were discussed with patient.  Patient is in agreement and understands the assessment and plan.  All questions were answered.    Odalis Guerrero PA-C, [facility]  [facility] [place]: Phone: [phone], Fax: [phone]  [facility]: Phone: [phone],  Fax: [phone]  [facility]:  Phone: [phone], Fax: [phone]  ____________________________________________    CC: No chief complaint on file.      Reviewed patients past medical history and pertinent chart review prior to patient's visit today.     HPI:  Ms. Evangelina De Anda is a 58 year old female who presents today as a return patient for rash evaluation.     Patient is otherwise feeling well, without additional concerns.    Labs:  N/A    Physical Exam:  Vitals: LMP 05/15/2019   SKIN: Sun-exposed skin, which includes the head/face, neck, both arms, digits, and/or nails was examined.    - no active rash  - Scattered brown macules on sun exposed areas.=  - There are waxy stuck on tan to brown papules on the legs..   - No other lesions of concern on areas examined.     Medications:  Current Outpatient Medications   Medication    desonide (DESOWEN) 0.05 % external cream    emollient (VANICREAM) external cream    estradiol (ESTRACE) 1 MG tablet    progesterone (PROMETRIUM) 100 MG capsule    triamcinolone (KENALOG) 0.025 % external ointment     No current facility-administered medications for this visit.      Past Medical/Surgical History:   Patient Active Problem List   Diagnosis    Tobacco use disorder    Adjustment disorder with mixed anxiety and depressed mood    Family history of colon cancer    Family history of coronary artery disease    Tubular adenoma    Anxiety    Unexplained endometrial cells on cervical Pap smear    MAYRA II (vulvar intraepithelial neoplasia II)    Lateral epicondylitis of left elbow    Headache, new daily persistent ([facility])    Cervicalgia     Past Medical History:   Diagnosis Date    Alcohol abuse, in remission     Endometrial cells on cervical Pap smear inconsistent w/LMP 9/2012    endo bx WNL    MAYRA II (vulvar intraepithelial neoplasia II) 12/2012

## 2024-01-30 ENCOUNTER — LAB (OUTPATIENT)
Dept: LAB | Facility: CLINIC | Age: 59
End: 2024-01-30
Payer: COMMERCIAL

## 2024-01-30 DIAGNOSIS — R94.4 DECREASED GFR: ICD-10-CM

## 2024-01-30 PROCEDURE — 36415 COLL VENOUS BLD VENIPUNCTURE: CPT

## 2024-01-30 PROCEDURE — 80048 BASIC METABOLIC PNL TOTAL CA: CPT

## 2024-01-31 ENCOUNTER — MYC MEDICAL ADVICE (OUTPATIENT)
Dept: FAMILY MEDICINE | Facility: CLINIC | Age: 59
End: 2024-01-31
Payer: COMMERCIAL

## 2024-01-31 LAB
ANION GAP SERPL CALCULATED.3IONS-SCNC: 9 MMOL/L (ref 7–15)
BUN SERPL-MCNC: 17 MG/DL (ref 6–20)
CALCIUM SERPL-MCNC: 9.6 MG/DL (ref 8.6–10)
CHLORIDE SERPL-SCNC: 101 MMOL/L (ref 98–107)
CREAT SERPL-MCNC: 0.92 MG/DL (ref 0.51–0.95)
DEPRECATED HCO3 PLAS-SCNC: 25 MMOL/L (ref 22–29)
EGFRCR SERPLBLD CKD-EPI 2021: 72 ML/MIN/1.73M2
GLUCOSE SERPL-MCNC: 102 MG/DL (ref 70–99)
POTASSIUM SERPL-SCNC: 4.7 MMOL/L (ref 3.4–5.3)
SODIUM SERPL-SCNC: 135 MMOL/L (ref 135–145)

## 2024-07-23 ENCOUNTER — PRE VISIT (OUTPATIENT)
Dept: ALLERGY | Facility: CLINIC | Age: 59
End: 2024-07-23

## 2024-07-23 ENCOUNTER — OFFICE VISIT (OUTPATIENT)
Dept: ALLERGY | Facility: CLINIC | Age: 59
End: 2024-07-23
Attending: PHYSICIAN ASSISTANT
Payer: COMMERCIAL

## 2024-07-23 DIAGNOSIS — L23.81 CAT ALLERGY DUE TO BOTH AIRBORNE AND SKIN CONTACT: ICD-10-CM

## 2024-07-23 DIAGNOSIS — L29.9 PRURITIC DISORDER: ICD-10-CM

## 2024-07-23 DIAGNOSIS — L50.8 CHRONIC URTICARIA: ICD-10-CM

## 2024-07-23 DIAGNOSIS — Z91.09 HOUSE DUST MITE ALLERGY: Primary | ICD-10-CM

## 2024-07-23 DIAGNOSIS — J30.2 SEASONAL AND PERENNIAL ALLERGIC RHINOCONJUNCTIVITIS: ICD-10-CM

## 2024-07-23 DIAGNOSIS — J30.9 ALLERGIC RHINITIS WITH POSTNASAL DRIP: ICD-10-CM

## 2024-07-23 DIAGNOSIS — R09.82 ALLERGIC RHINITIS WITH POSTNASAL DRIP: ICD-10-CM

## 2024-07-23 DIAGNOSIS — H10.10 SEASONAL AND PERENNIAL ALLERGIC RHINOCONJUNCTIVITIS: ICD-10-CM

## 2024-07-23 DIAGNOSIS — L20.89 OTHER ATOPIC DERMATITIS: ICD-10-CM

## 2024-07-23 DIAGNOSIS — J30.89 SEASONAL AND PERENNIAL ALLERGIC RHINOCONJUNCTIVITIS: ICD-10-CM

## 2024-07-23 DIAGNOSIS — J30.81 CAT ALLERGY DUE TO BOTH AIRBORNE AND SKIN CONTACT: ICD-10-CM

## 2024-07-23 DIAGNOSIS — L23.5 ALLERGIC DERMATITIS DUE TO OTHER CHEMICAL PRODUCT: ICD-10-CM

## 2024-07-23 PROCEDURE — 99215 OFFICE O/P EST HI 40 MIN: CPT | Mod: 25 | Performed by: DERMATOLOGY

## 2024-07-23 PROCEDURE — 95004 PERQ TESTS W/ALRGNC XTRCS: CPT | Performed by: DERMATOLOGY

## 2024-07-23 RX ORDER — DESONIDE 0.5 MG/G
CREAM TOPICAL 2 TIMES DAILY
Qty: 60 G | Refills: 2 | Status: SHIPPED | OUTPATIENT
Start: 2024-07-23 | End: 2024-07-23

## 2024-07-23 RX ORDER — TACROLIMUS 1 MG/G
OINTMENT TOPICAL
Qty: 60 G | Refills: 2 | Status: SHIPPED | OUTPATIENT
Start: 2024-07-23

## 2024-07-23 RX ORDER — DESONIDE 0.5 MG/G
CREAM TOPICAL 2 TIMES DAILY
Qty: 60 G | Refills: 2 | Status: SHIPPED | OUTPATIENT
Start: 2024-07-23

## 2024-07-23 RX ORDER — TACROLIMUS 1 MG/G
OINTMENT TOPICAL 2 TIMES DAILY PRN
Qty: 60 G | Refills: 2 | Status: SHIPPED | OUTPATIENT
Start: 2024-07-23 | End: 2024-07-23

## 2024-07-23 NOTE — NURSING NOTE
Chief Complaint   Patient presents with    Allergy Consult     Ref from ARIC Guerrero L30.9 (ICD-10-CM) - Dermatitis. Per pt, random flare ups of burning on face, lasts a few days, then comes and goes. Has tried to figure out what could be causing it, but nothing standing out fully. Ongoing for years now. Most recent flare up it also then included the chest area and was really dry (sandpaper feeling), back itching. Claritin helps with the skin itching. No hx of allergy testing. Fall is worse.     Nikki Pickens, RN

## 2024-07-23 NOTE — TELEPHONE ENCOUNTER
FUTURE VISIT INFORMATION      FUTURE VISIT INFORMATION:  Date: 7.23.24  Time: 2:00  Location: [facility] [place], [place]  REFERRAL INFORMATION:  Referring provider:  Cesar  Referring providers clinic:  Derm  Reason for visit/diagnosis  sent mc. MS      RECORDS REQUESTED FROM:       Clinic name Comments Records Status Imaging Status   Derm 1.29.24  Cesar [facility] 11.21.23  Natalya [facility] 3.18.22  [facility]

## 2024-07-23 NOTE — LETTER
7/23/2024      Evangelina De Anda  [id number]  [facility] [address]      Dear Colleague,    Thank you for referring your patient, Evangelina De Anda, to the [facility] [place]. Please see a copy of my visit note below.    [facility] Dermato-allergology Note  Office visit  Encounter Date: Jul 23, 2024  ____________________________________________    CC: Allergy Consult (Ref from ARIC Guerrero L30.9 (ICD-10-CM) - Dermatitis. Per pt, random flare ups of burning on face, lasts a few days, then comes and goes. Has tried to figure out what could be causing it, but nothing standing out fully. Ongoing for years now. Most recent flare up it also then included the chest area and was really dry (sandpaper feeling), back itching. Claritin helps with the skin itching. No hx of allergy testing. Fall is worse.)      HPI:  (Jul 23, 2024)  Ms. Evangelina De Anda is a(n) 58 year old female who presents today as new patient for allergy consultation  - Referred by VÍCTOR Solis for suspected dermatitis (ACD vs irritant contact dermatitis)  - Previously referred to this clinic for the same reason by Dr. Cash on 3/22/22  - 2 years ago ago felt a burning on the face and itching  - Put aloe vera on it which did not help  - Got better of the next couple of days, and then returned with some redness on the following Wednesday  - No rash but maybe some redness. Washes hair on Wednesdays. Friday went to ER, who gave her a low dose steroid and vanicream wash.   - Did not take photos; says you could not see the rash on her body, but her skin was rough  - Derm referral, but it had resolved and patch testing referral was made. Then happened again this past September for 1 month on her upper trunk Her PCP said it looked like strep throat symptoms, but didn't have any sick symptoms. Wore a dress that wasn't washed before, but no other new clothing. This time it was not responsive to antihistamines.    - Then, rash started on face again 2023, and then she was seen at  2023  - Has not had any issues since 2023  ***- Never had sensitive skin, but has an allergic reaction to touch in fall and spring and develops hives and resolves with antihistamines. If she doesn't take antihistamines for 7-10 days get hives, which resolve when she takes claratin.Touch triggers her hives.    - New house 3 years ago that had air mold, which was treated, and replaced all the carpeting.   - Last  her hair 2024  - Otherwise feeling well in usual state of health    Seasonal allergies in the fall itchy eyes, eye discharge for which she uses Pataday and Claratin D every 3 days because she losing her apetite. Always feels nasal congestion especially in the morning, occasionally sneezing. Also endorses coughing when exercising with elevated heart rate since COVID.   Pataday daily  Claritin, and sometimes Claritin D in fall  If she takes generic claritin once every 2 weeks  Gets itchy bumps and   Does not feel like she has dry skin  - No childhood history of asthma or eczema.      Physical Exam:  General: In no acute distress, well-developed, well-nourished  Eyes: no conjunctivitis  ENT: no signs of rhinitis   Pulmonary: no wheezing or coughing  Skin: Focused examination of the skin on test sites was performed = see test results below    Past Medical History:   Patient Active Problem List   Diagnosis     Tobacco use disorder     Adjustment disorder with mixed anxiety and depressed mood     Family history of colon cancer     Family history of coronary artery disease     Tubular adenoma     Anxiety     Unexplained endometrial cells on cervical Pap smear     MAYRA II (vulvar intraepithelial neoplasia II)     Lateral epicondylitis of left elbow     Headache, new daily persistent (ND[facility])     Cervicalgia     Past Medical History:   Diagnosis Date     Alcohol abuse, in remission      Endometrial cells on cervical Pap smear  inconsistent w/LMP 2012    endo bx WNL     MAYRA II (vulvar intraepithelial neoplasia II) 2012     Allergies:  Allergies   Allergen Reactions     Pollen Extract      Medications:  Current Outpatient Medications   Medication Sig Dispense Refill     desonide (DESOWEN) 0.05 % external cream Apply topically 2 times daily 60 g 3     emollient (VANICREAM) external cream Apply topically 3 times daily 453 g 0     estradiol (ESTRACE) 1 MG tablet Take 1 tablet by mouth daily at 2 pm       progesterone (PROMETRIUM) 100 MG capsule Take 100 mg by mouth at bedtime       triamcinolone (KENALOG) 0.025 % external ointment Apply topically 2 times daily 80 g 1     No current facility-administered medications for this visit.     Social History:  The patient works as a . Patient has the following hobbies or non-occupational exposure: spends time outdoors (biking, running); fiance works in lawn care.     Family History:  Family History   Problem Relation Age of Onset     C.A.D. Father 65        heart attack and quadruble bypass     Cancer Father         spleen     Heart Disease Father      Hypertension Paternal Grandmother      Cerebrovascular Disease Maternal Grandmother      Cancer - colorectal Mother         in her 50's.     Neurologic Disorder Mother         MS- at age of 65     Heart Disease Sister 38         from massive heart attack at age of 38     Family History Negative Brother      Family History Negative Sister      Previous Labs, Allergy Tests, Dermatopathology, Imaging:  [Upon review of Epic chart, no prior allergy records as of 24.]    24 V[facility]CTOR Solis (derm) - most recent derm visit in [facility] as of 24  DERM PROBLEM LIST:  1. Suspected allergic vs irritant contact dermatitis  - Desonide 0.05% cream BID to face, triam 0.025% oint     Social:     A&P:  # Suspected dermatitis - ACD vs irritant contact derm - no rash present on exam today -  cosmecueticals/shampoo/hair care/ etc suspected  - dermatoallergy referral placed     # Lentigines - face   - Sunscreen: Apply 20 minutes prior to going outdoors and reapply every two hours, when wet or sweating. We recommend using an SPF 30 or higher, and to use one that is water resistant.     - Advised to monitor for changing, non-healing, bleeding, painful, changing, or otherwise symptomatic lesions     # SK - thigh  - reassured of benign etiology      11/21/23 Holli Hoang NP (FP)  FROM [facility]:  Reason for visit:  Skin Irritation  Symptom onset:  More than a month  Symptoms include:  Rough Itch skin  Symptom intensity:  Moderate  Symptom progression:  Staying the same  Had these symptoms before:  No  What makes it worse:  No  What makes it better:  No    Dry sandpaper like rash on back for about 6 weeks.   Facial spot to right jaw line resolved 1 1/2- 2 weeks ago.     FROM A&P:  Dermatitis  Patient presents with an acute onset of a facial spot on the right jaw line and sandpaper rash most prominent to the back.   - Adult Dermatology  Referral  - triamcinolone (KENALOG) 0.025 % external ointment  Dispense: 80 g; Refill: 1  -Encouraged continue use of an emollient cream (Vanicream) to back    3/22/22 Dr. Romero Cash (derm)  FROM [facility]:  New patient for a rash. Previously seen by urgent care on 3/18/22 at which point she was started on methylprednisolone for treatment of a red, dry, and burning rash.      She notes 3 weeks ago having initial onset of a burning, red, itchy red facial rash for the first tie. She stopped all facial products, and that seemed to help. Two weeks later, she had an unexpected recurrence, and went to urgent care where they prescribed prednisone, which has helped greatly. She uses Aveeno and Gold Bond healing face creams     She wonders if it may be similar to razor burn.     A&P:  #. Suspected allergic vs irritant contact dermatitis - possibly airborne or photoinduced  -  Dermatoallergology referral  - Desonide 0.05% cream BID to face  - Keep topicals to an absolute minimum    3/18/22 Telephone encounter (derm)  [facility]     Phone Message     May a detailed message be left on voicemail: yes      Reason for Call: Appointment Intake       Referring Provider Name: Storm BeckerHARI CNP in  URGENT CARE     Diagnosis and/or Symptoms: Irritant contact dermatitis, unspecified trigger; Rash     New Pt Appt with Priority Referral Order - Urgent: 3-5 Days - Pt requesting OX location as she lives in [place]     Please review Referral and Records and call Pt to schedule - she could not wait for first opening end of June so per protocol routing TE to see what you can do since Urgent Referral Order     Please call her to discuss - Thank you!     Action Taken: Message routed to:  Other: OX DERM     Travel Screening: Not Applicable         Referred By: Odalis Guerrero PA-C (derm)  [address] [place] [address]     Allergy Tests:  Past Allergy Test - patient confirmed no prior testing as of 7/23/24.    Order for Future Allergy Testing:    [x] Outpatient  [] Inpatient: [facility]..../ Bed ....       Skin Atopy (atopic dermatitis) [] Yes   [x] No .........  Contact allergies:   [] Yes   [x] No ..........no reaction to metal  Hand eczema:   [] Yes   [x] No           Leading hand:   [] R   [] L       [] Ambidextrous         Drug allergies:        [] Yes   [x] No  which?......    Urticaria/Angioedema  [x] Yes   [] No .........  Food Allergy:  [] Yes   [x] No   - Joint stiffness/pain with gluten, almond, spinach, and dairy    Pets :  [x] Yes     - has a dog at home that is usually downstairs; she does not react when she pets him, though she is rarely around him  - a while ago, if she would be in a house with a cat, would have sneezing      [x]  Rhinitis   [x] Conjunctivitis   [] Sinusitis   [] Polyposis   [] Otitis   [] Pharyngitis         [x]  Postnasal drip    []   none  Operations:   [] Tonsils   [] Septum   [] Sinus   [] Polyposis        [] Asthma bronchiale   [x] Coughing (exercise-induced)      []  none  Symptoms (mostly rhinoconjunctivitis and asthma) aggravated by:  Season   [] I   [] II   [x] III   [x] IV   [x]V   []VI   []VII   []VIII   [x]IX   [x]X   [x]XI   []XII     [x] perennial   Day time      [] morning   [] noon      [] evening        [] night    [] whole day  [x]  none  Location/changes    [] inside        [] outside   [] mountains    [] sea     [] others   [x]  none  Triggers, specific     [x] Animals (cats)     [] plants     [] dust              [] others    []  none  Triggers, others       [] work          [] psyche    [] sport            [] others   [x]  none  Irritant                [] phys efforts [] smoke    [] heat/cold     [] odors  []others. [x]  none    Order for PATCH TESTS  Reason for tests (suspected allergy): recurrent dermatitis  Known previous allergies: none  Standardized panels  [x] Standard panel (40 tests)  [x] Preservatives & Antimicrobials (31 tests)  [x] Emulsifiers & Additives (25 tests)   [x] Perfumes/Flavours & Plants (25 tests)  [x] Hairdresser panel (12 tests)  [] Rubber Chemicals (22 tests)  [] Plastics (26 tests)  [] Colorants/Dyes/Food additives (20 tests)  [] Metals (implants/dental) (24 tests)  [] Local anaesthetics/NSAIDs (13 tests)  [] Antibiotics & Antimycotics (14 tests)   [] Corticosteroids (15 tests)   [] Photopatch test (62 tests)   [] Others:       [] Patient's Own Products:   DO NOT test if chemical or biological identity is unknown!   always ask from patient the product information and safety sheets (MSDS)       Order for PRICK TESTS    Reason for tests (suspected allergy): RC and PND mostly in spring and fall  Known previous allergies: none    Standardized prick panels  [x] Atopic panel (20 tests)  [] Pediatric Panel (12 tests)  [] Milk, Meat, Eggs, Grains (20 tests)   [] Dust, Epithelia, Feathers (10 tests)  []  Fish, Seafood, Shellfish (17 tests)  [] Nuts, Beans (14 tests)  [] Spice, Vegetable, Fruit (17 tests)  [] Pollen Panel = Tree, Grass, Weed (24 tests)  [] Others:       [] Patient's Own Products:   DO NOT test if chemical or biological identity is unknown!   always ask from patient the product information and safety sheets (MSDS)     Standardized intradermal tests    [x] Alternaria alternata  [x] Aspergillus fumigatus  [] Cladosporium herbarum   [x] Penicillium notatum  [x] Dermatophagoides farinae  [x] Dermatophagoides pteronyssinus  [x] Dog Epithelium  [] Cat Epithelium  [] Others:     [] Bee venom   [] Wasp venom  !!Specific protocol with dilutions!!       Order for Drug allergy tests (prick & intradermal & patch tests)    [] Penicillin G  [] Ampicillin [] Cefazolin   [] Ceftriaxone   [] Ceftazidime  [] Bactrim    [] Others:   Order for ... as test date    Atopy Screen (Placed Jul 23, 2024)  No Substance Readings (15 min) Evaluation   POS Histamine 1mg/ml ++    NEG NaCl 0.9% -      No Substance Readings (15 min) Evaluation   1 Alternaria alternata (tenuis)  -    2 Cladosporium herbarum -    3 Aspergillus fumigatus -    4 Penicillium notatum +    5 Dermatophagoides pteronyssinus ++    6 Dermatophagoides farinae ++    7 Dog epithelium (canis spp) -    8 Cat hair (shilpa catus) +/++    9 Cockroach   (Blatella americana & germanica) -    10 Grass mix midwest   (Laura, Orchard, Redtop, Jorge) -    11 Araon grass (sorghum halepense) -    12 Weed mix   (common Cocklebur, Lamb s quarters, rough redroot Pigweed, Dock/Sorrel) -    13 Mug wort (artemisia vulgare) -    14 Ragweed giant/short (ambrosia spp) -    15 White birch (Betula papyrifera) ++    16 Tree mix 1 (Pecan, Maple BHR, Oak RVW, american [place], black [place]) ++    17 Red cedar (juniperus virginia) -    18 Tree mix 2   (white Manjit, river/red Birch, black Beason, common [place], american Elm) ++    19 Box elder/Maple mix (acer spp) +    20 [place] shagbark  (carya ovata) -    Conclusion:      Assessment & Plan:    ==> Final Diagnosis:     # Recurrent eczematous rashes on face and upper trunk for > 2 years  DDx: Atopic dermatitis with irritant dermatitis vs. allergic contact dermatitis  * chronic illness with exacerbation, progression, side effects from treatment    # Chronic urticaria with pruritus  * chronic illness with exacerbation, progression, side effects from treatment    # Atopic predisposition with:   RC and PND mostly in spring and fall with sensitization to dust mites and tree pollen (in spring)  Sensitization to cat dander  * chronic illness with exacerbation, progression, side effects from treatment    These conclusions are made at the best of one's knowledge and belief based on the provided evidence such as patient's history and allergy test results and they can change over time or can be incomplete because of missing information.    ==> Treatment Plan:    >> Updated topical regimen:  - For maintenance, start Protopic: Apply topically 2 times daily as needed (red, itchy spots on face).  - For flares, use desonide cream: Apply topically 2 times daily PRN.     >> For chronic urticaria with pruritus    >> For house dust mite allergy, provided informational handout for house dust mite reduction.    Procedures Performed: Allergy prick tests    Staff Involved: Provider, Staff, Resident, and Scribe    Scribe Disclosure:   I, BENIGNO BOYCE, am serving as a scribe to document services personally performed by Dez Chavez MD based on data collection and the provider's statements to me.     Staff Physician Comments:  I was present with the scribe who participated in the documentation of the note. I have verified the history and personally performed the physical exam and medical decision making. I agree with the assessment and plan as documented in the note. I have reviewed and if necessary amended the note.      Also, I saw and evaluated the patient with the  resident and I agree with the assessment and plan as documented in the resident's note.    Dez Chavez MD  Professor  Head of Dermato-Allergy Division  Department of Dermatology  [facility]     Follow-up in Derm-Allergy clinic in 2/2025 or 3/2025, with check-in visit to evaluate if patch tests necessary    I spent a total of 40 minutes with Evangelina De Anda. This time was spent counseling the patient and/or coordinating care, explaining the allergy tests, performing allergy tests and assessing the clinical relevance.       Again, thank you for allowing me to participate in the care of your patient.        Sincerely,        Dez Chavez MD

## 2024-07-23 NOTE — PROGRESS NOTES
"[facility] Dermato-allergology Note  Office visit  Encounter Date: Jul 23, 2024  ____________________________________________    CC: Allergy Consult (Ref from ARIC Guerrero L30.9 (ICD-10-CM) - Dermatitis. Per pt, random flare ups of burning on face, lasts a few days, then comes and goes. Has tried to figure out what could be causing it, but nothing standing out fully. Ongoing for years now. Most recent flare up it also then included the chest area and was really dry (sandpaper feeling), back itching. Claritin helps with the skin itching. No hx of allergy testing. Fall is worse.)      HPI:  (Jul 23, 2024)  Ms. Evangelina De Anda is a(n) 58 year old female who presents today as new patient for allergy consultation  - Referred by VÍCTOR Solis for suspected dermatitis (ACD vs irritant contact dermatitis)  - Previously referred to this clinic for the same reason by Dr. Cash on 3/22/22  - 2 years ago ago, experienced a burning on the face and itching  - Put aloe vera on it which did not help  - Got better of the next couple of days, and then returned with some redness on the following Wednesday  - Notes that she washed her hair on Wednesdays  - Went to the ED on Friday; prescribed low-dose steroid and Vanicream wash  - Did not take photos; says you could not see the rash on her body, but her skin was rough  - Derm referred her for patch testing, but rash had resolved, so she decided to not proceed with patch testing at that time  - No issues after that  - Then in 9/2023, developed rash on upper trunk  - Says PCP told her it looked like strep throat symptom, though she didn't have any other strep throat symptoms  - Around that time, she had worn a dress that she did not wash before wearing, but otherwise no new clothing  - Rash was not responsive to antihistamines  - Seen at  11/2023  - Moved into a new house 3 years ago, and reports it had \"air mold\", which was treated and then all of the carpeting " was replaced  - Last  her hair 2024  - Otherwise feeling well in usual state of health    Physical Exam:  General: In no acute distress, well-developed, well-nourished  Eyes: no conjunctivitis  ENT: no signs of rhinitis   Pulmonary: no wheezing or coughing  Skin: Focused examination of the skin on test sites was performed = see test results below    Past Medical History:   Patient Active Problem List   Diagnosis    Tobacco use disorder    Adjustment disorder with mixed anxiety and depressed mood    Family history of colon cancer    Family history of coronary artery disease    Tubular adenoma    Anxiety    Unexplained endometrial cells on cervical Pap smear    MAYRA II (vulvar intraepithelial neoplasia II)    Lateral epicondylitis of left elbow    Headache, new daily persistent ([facility])    Cervicalgia     Past Medical History:   Diagnosis Date    Alcohol abuse, in remission     Endometrial cells on cervical Pap smear inconsistent w/LMP 2012    endo bx WNL    MAYRA II (vulvar intraepithelial neoplasia II) 2012     Allergies:  No Active Allergies    Medications:  Current Outpatient Medications   Medication Sig Dispense Refill    desonide (DESOWEN) 0.05 % external cream Apply topically 2 times daily Apply topically to red, itchy spots on face once daily Saturday and . If flaring, hold tacrolimus (Protopic) and only apply desonide for up to 4 days, and then resume maintenance routine. 60 g 2    tacrolimus (PROTOPIC) 0.1 % external ointment Apply topically to red, itchy spots on face 2 times daily Monday through Friday for maintenance of dermatitis. 60 g 2    emollient (VANICREAM) external cream Apply topically 3 times daily 453 g 0    estradiol (ESTRACE) 1 MG tablet Take 1 tablet by mouth daily at 2 pm      progesterone (PROMETRIUM) 100 MG capsule Take 100 mg by mouth at bedtime      triamcinolone (KENALOG) 0.025 % external ointment Apply topically 2 times daily 80 g 1     No current facility-administered  medications for this visit.     Social History:  The patient works as a . Patient has the following hobbies or non-occupational exposure: spends time outdoors (biking, running); soraya works in lawn care.     Family History:  Family History   Problem Relation Age of Onset    C.A.D. Father 65        heart attack and quadruble bypass    Cancer Father         spleen    Heart Disease Father     Hypertension Paternal Grandmother     Cerebrovascular Disease Maternal Grandmother     Cancer - colorectal Mother         in her 50's.    Neurologic Disorder Mother         MS- at age of 65    Heart Disease Sister 38         from massive heart attack at age of 38    Family History Negative Brother     Family History Negative Sister      Previous Labs, Allergy Tests, Dermatopathology, Imaging:  [Upon review of Epic chart, no prior allergy records as of 24.]    24 VÍCTOR Solis (derm) - most recent derm visit in [facility] as of 24  DERM PROBLEM LIST:  1. Suspected allergic vs irritant contact dermatitis  - Desonide 0.05% cream BID to face, triam 0.025% oint     Social:     A&P:  # Suspected dermatitis - ACD vs irritant contact derm - no rash present on exam today - cosmecueticals/shampoo/hair care/ etc suspected  - dermatoallergy referral placed     # Lentigines - face   - Sunscreen: Apply 20 minutes prior to going outdoors and reapply every two hours, when wet or sweating. We recommend using an SPF 30 or higher, and to use one that is water resistant.     - Advised to monitor for changing, non-healing, bleeding, painful, changing, or otherwise symptomatic lesions     # SK - thigh  - reassured of benign etiology    23 Holli Hoang NP (FP)  FROM [facility]:  Reason for visit:  Skin Irritation  Symptom onset:  More than a month  Symptoms include:  Rough Itch skin  Symptom intensity:  Moderate  Symptom progression:  Staying the same  Had these symptoms before:  No  What makes it  worse:  No  What makes it better:  No    Dry sandpaper like rash on back for about 6 weeks.   Facial spot to right jaw line resolved 1 1/2- 2 weeks ago.     FROM A&P:  Dermatitis  Patient presents with an acute onset of a facial spot on the right jaw line and sandpaper rash most prominent to the back.   - Adult Dermatology [facility] Referral  - triamcinolone (KENALOG) 0.025 % external ointment  Dispense: 80 g; Refill: 1  -Encouraged continue use of an emollient cream (Vanicream) to back    3/22/22 Dr. Romero Csah (derm)  FROM [facility]:  New patient for a rash. Previously seen by urgent care on 3/18/22 at which point she was started on methylprednisolone for treatment of a red, dry, and burning rash.      She notes 3 weeks ago having initial onset of a burning, red, itchy red facial rash for the first tie. She stopped all facial products, and that seemed to help. Two weeks later, she had an unexpected recurrence, and went to urgent care where they prescribed prednisone, which has helped greatly. She uses Aveeno and Gold Bond healing face creams     She wonders if it may be similar to razor burn.     A&P:  #. Suspected allergic vs irritant contact dermatitis - possibly airborne or photoinduced  - Dermatoallergology referral  - Desonide 0.05% cream BID to face  - Keep topicals to an absolute minimum    3/18/22 Telephone encounter (derm)  [facility] Call Center     Phone Message     May a detailed message be left on voicemail: yes      Reason for Call: Appointment Intake       Referring Provider Name: Storm Becker APRN CNP in  URGENT CARE [ records not in Epic as of 7/23/24.]     Diagnosis and/or Symptoms: Irritant contact dermatitis, unspecified trigger; Rash     New Pt Appt with Priority Referral Order - Urgent: 3-5 Days - Pt requesting OX location as she lives in [place]     Please review Referral and Records and call Pt to schedule - she could not wait for first opening end of June so per protocol  "routing TE to see what you can do since Urgent Referral Order     Please call her to discuss - Thank you!     Action Taken: Message routed to:  Other: OX DERM     Travel Screening: Not Applicable         Referred By: Odalis Guerrero PA-C (derm)  [address] [place] [address]     Allergy Tests:  Past Allergy Test - patient confirmed no prior testing as of 7/23/24.    Order for Future Allergy Testing:    [x] Outpatient  [] Inpatient: [facility]..../ Bed ....       Skin Atopy (atopic dermatitis) [] Yes   [x] No .........denies h/o sensitive skin or eczema, including in childhood    Contact allergies:   [] Yes   [x] No ..........no reaction to metal  Hand eczema:   [] Yes   [x] No           Leading hand:   [] R   [] L       [] Ambidextrous         Drug allergies:        [] Yes   [x] No  which?......    Urticaria/Angioedema  [x] Yes   [] No .........  - Reports she develops \"hives\" in spring and fall that resolve with antihistamines (usually Claritin)  - \"Hives\" also triggered by touch    Food Allergy:  [] Yes   [x] No   - Joint stiffness/pain with gluten, almond, spinach, and dairy    Pets :  [x] Yes     - has a dog at home that is usually downstairs; she does not react when she pets him, though she is rarely around him  - a while ago, if she would be in a house with a cat, would have sneezing        [x]  Rhinitis (nasal congestion with occasional sneezing)   [x] Conjunctivitis (itchy, discharge)   [] Sinusitis   [] Polyposis   [] Otitis   [] Pharyngitis         [x]  Postnasal drip    []  None  - Uses Pataday daily for eye symptoms  - In the fall, takes either Claritin or Claritin-D (the latter she may take every 3 days, as it causes her to lose her appetite)    Operations:   [] Tonsils   [] Septum   [] Sinus   [] Polyposis        [] Asthma bronchiale   [x] Coughing (since COVID, exercise-induced and also accompanied by tachycardia)      []  none  Symptoms (mostly rhinoconjunctivitis and asthma) aggravated " by:  Season   [] I   [] II   [x] III   [x] IV   [x]V   []VI   []VII   []VIII   [x]IX   [x]X   [x]XI   []XII     [x] perennial   Day time      [x] Morning (nasal congestion)  [] noon      [] evening        [] night    [] whole day  []  none  Location/changes    [] inside        [] outside   [] mountains    [] sea     [] others   [x]  none  Triggers, specific     [x] Animals (cats)     [] plants     [] dust              [] others    []  none  Triggers, others       [] work          [] psyche    [x] Sport (cough)            [] others   []  none  Irritant                [] phys efforts [] smoke    [] heat/cold     [] odors  []others. [x]  none    Order for PATCH TESTS  Reason for tests (suspected allergy): recurrent dermatitis  Known previous allergies: none  Standardized panels  [x] Standard panel (40 tests)  [x] Preservatives & Antimicrobials (31 tests)  [x] Emulsifiers & Additives (25 tests)   [x] Perfumes/Flavours & Plants (25 tests)  [x] Hairdresser panel (12 tests)  [] Rubber Chemicals (22 tests)  [] Plastics (26 tests)  [] Colorants/Dyes/Food additives (20 tests)  [] Metals (implants/dental) (24 tests)  [] Local anaesthetics/NSAIDs (13 tests)  [] Antibiotics & Antimycotics (14 tests)   [] Corticosteroids (15 tests)   [] Photopatch test (62 tests)   [] Others:       [] Patient's Own Products:   DO NOT test if chemical or biological identity is unknown!   always ask from patient the product information and safety sheets (MSDS)       Order for PRICK TESTS    Reason for tests (suspected allergy): RC and PND mostly in spring and fall  Known previous allergies: none    Standardized prick panels  [x] Atopic panel (20 tests)  [] Pediatric Panel (12 tests)  [] Milk, Meat, Eggs, Grains (20 tests)   [] Dust, Epithelia, Feathers (10 tests)  [] Fish, Seafood, Shellfish (17 tests)  [] Nuts, Beans (14 tests)  [] Spice, Vegetable, Fruit (17 tests)  [] Pollen Panel = Tree, Grass, Weed (24 tests)  [] Others:       [] Patient's Own  Products:   DO NOT test if chemical or biological identity is unknown!   always ask from patient the product information and safety sheets (MSDS)     Standardized intradermal tests    [x] Alternaria alternata  [x] Aspergillus fumigatus  [] Cladosporium herbarum   [x] Penicillium notatum  [x] Dermatophagoides farinae  [x] Dermatophagoides pteronyssinus  [x] Dog Epithelium  [] Cat Epithelium  [] Others:     [] Bee venom   [] Wasp venom  !!Specific protocol with dilutions!!       Order for Drug allergy tests (prick & intradermal & patch tests)    [] Penicillin G  [] Ampicillin [] Cefazolin   [] Ceftriaxone   [] Ceftazidime  [] Bactrim    [] Others:   Order for ... as test date    Atopy Screen (Placed Jul 23, 2024)  No Substance Readings (15 min) Evaluation   POS Histamine 1mg/ml ++    NEG NaCl 0.9% -      No Substance Readings (15 min) Evaluation   1 Alternaria alternata (tenuis)  -    2 Cladosporium herbarum -    3 Aspergillus fumigatus -    4 Penicillium notatum +    5 Dermatophagoides pteronyssinus ++    6 Dermatophagoides farinae ++    7 Dog epithelium (canis spp) -    8 Cat hair (shilpa catus) +/++    9 Cockroach   (Blatella americana & germanica) -    10 Grass mix midwest   (Laura, Orchard, Redtop, Jorge) -    11 Aaron grass (sorghum halepense) -    12 Weed mix   (common Cocklebur, Lamb s quarters, rough redroot Pigweed, Dock/Sorrel) -    13 Mug wort (artemisia vulgare) -    14 Ragweed giant/short (ambrosia spp) -    15 White birch (Betula papyrifera) ++    16 Tree mix 1 (Pecan, Maple BHR, Oak RVW, american Norris, black [place]) ++    17 Red cedar (juniperus virginia) -    18 Tree mix 2   (white Manjit, river/red Birch, black [place], common [place], american Elm) ++    19 Box elder/Maple mix (acer spp) +    20 Grandy shagbark (carya ovata) -    Conclusion: She is clearly atopic; therefore, IDT not necessary.      Assessment & Plan:    ==> Final Diagnosis:     # Recurrent eczematous rashes on face and upper  trunk for > 2 years  DDx: Atopic dermatitis with irritant dermatitis vs. allergic contact dermatitis  * chronic illness with exacerbation, progression, side effects from treatment    # Chronic urticaria with pruritus  * chronic illness with exacerbation, progression, side effects from treatment    # Atopic predisposition with:   RC and PND mostly in spring and fall with sensitization to dust mites and tree pollens (in spring)  Sensitization to cat dander  * chronic illness with exacerbation, progression, side effects from treatment    These conclusions are made at the best of one's knowledge and belief based on the provided evidence such as patient's history and allergy test results and they can change over time or can be incomplete because of missing information.    ==> Treatment Plan:    >> Updated topical regimen:  - For maintenance, start Protopic: Apply topically 2 times daily as needed (red, itchy spots on face).  - For flares, use desonide cream: Apply topically 2 times daily PRN.   - If she has 1-2 flares between this appointment and scheduled patch tests (currently set to start 2/10/25), will proceed with patch tests as planned.  - However, if dermatitis improved with above regimen, will cancel planned patch tests.    >> For chronic urticaria with pruritus, take antihistamine (not antihistamine with decongestant) daily for a few months. If symptoms do not improve or worsen, will discuss alternative treatment regimen.    >> For house dust mite allergy, provided informational handout for house dust mite reduction.    Procedures Performed: Allergy prick tests    Staff Involved: Provider, Staff, Resident, and Scribe    Scribe Disclosure:   I, BENIGNO BOYCE, am serving as a scribe to document services personally performed by Dez Chavez MD based on data collection and the provider's statements to me.     Staff Physician Comments:  I was present with the scribe who participated in the documentation of the  note. I have verified the history and personally performed the physical exam and medical decision making. I agree with the assessment and plan as documented in the note. I have reviewed and if necessary amended the note.      Also, I saw and evaluated the patient with the resident and I agree with the assessment and plan as documented in the resident's note.    Dez Chavez MD  Professor  Head of Dermato-Allergy Division  Department of Dermatology  [facility]     Follow-up in Derm-Allergy clinic in 2/2025 or 3/2025, with check-in visit to evaluate if patch tests necessary    I spent a total of 40 minutes with Evangelina De Anda. This time was spent counseling the patient and/or coordinating care, explaining the allergy tests, performing allergy tests and assessing the clinical relevance.

## 2024-07-23 NOTE — PATIENT INSTRUCTIONS
_____________________________    PATCH TESTING    WHAT IS A PATCH TEST ?    A patch test is a way of identifying whether a substance has caused a delayed reaction with skin inflammation, such as contact eczema or delayed (after days) reactions to drugs. We will use various different types of test compounds, which may include common allergens in occupation and daily life such as  preservatives, fragrances or even drugs. Most of the time we will use standardized, prefabricated test solutions and the choice of the substances and series tested will depend on the history of the allergic reactions. Sometimes we will test your own products you are exposed at home or at work. In order to avoid severe toxic reactions we need detailed information s or safety sheets about each of these test compounds.    HOW IS A PATCH TEST PERFORMED ?    You will be given three appointments to complete this test. On the first appointment the nurse will apply 100-180 small test chambers each one of them containing a different allergen on your back and/or on your arms. We will use hypoallergenic and somehow waterproof adhesive tape. Afterwards the different sites will be marked with a waterproof marker. These patches must stay in place for 2 days. On the second appointment the patches will be removed and the allergy doctor/nurse will evaluate the skin reactions and maybe re-apply the marks. On the third appointment the allergy doctor will re-evaluate possible allergic reactions and discuss with you the nature of the allergens you react to and how to avoid them.    AVOID THE FOLLOWING:    DO NOT wash your back and other test areas during the entire test period (3-5 days), NO bathing or swimming  AVOID strenuous exercise with sweating  DO NOT scratch the test area  AVOID exposure to UV irradiation (sun, therapy)    Patch tests should be performed when the allergy is resolved  Remove patch tests only if severe reaction (itch, pain, blistering)  and report to your doctor immediately. Karen then the locations of each test field  If patch tests peel off, then try to fix them and record time and karen test field  No oral steroids (e.g. Prednisone) 1 month prior to tests    WHAT ARE THE POSSIBLE RISKS OF PATCH TESTS ?    If you are allergic to a compound tested you will develop after a few days localized skin reactions similar to your previous allergic reaction. This includes formation of red, itchy skin lesions of few mm to cm with small vesicles and even blisters. The lesions will fade off over days and weeks and might sometimes leave for a few months some skin pigmentations. In rare cases a localized reaction to patch tests can become generalized. The tests with your own products might have some risks because they are not standardized and unanticipated reactions can occur. We need as much information as possible to evaluate if your own products are safe to test and under what conditions. This has to be evaluated for each individual product.        ? WHAT ARE THE PREPARATIONS NEEDED FOR THESE VARIOUS ALLERGY TESTS ?    Some medications can affect the reactions to allergens during the tests. Therefore reveal all the medications you take to the allergy team and the doctor will discuss with you the medications before/during the tests. Normally, you have to respect following rules (unless otherwise instructed by doctor):  For prick, intradermal and provocation tests stop antihistamines (e.g. loratadine (Claritin), cetirizine (Zyrtec), fexofenadine (Allegra) etc 1 week prior to the appointment   For patch tests and provocation tests, stop systemic corticosteroids 1 month and topical steroids 1 week prior to tests  Eat normally and take a shower before testing begins (do not put anything, including lotion, on the back after showering)    _____________________________    SKIN PRICK TESTING    WHAT IS A SKIN PRICK TEST (SPT) ?    A skin prick test (SPT) is a simple and  reliable diagnostic test used to identify substances ( allergens ) responsible for triggering the symptoms of allergy. The basic SPT panel includes common allergens, such as house dust mites, molds, dog and cat allergens and grass/tree pollen allergens. We have other more specialized series for various food allergens and sometimes we test your own food directly on your skin. Tests will be usually performed on the skin of the forearm (rarely on back). The skin may develop a red and itchy reaction which can be an indication of an allergy to to tested substance, but will be confirmed by discussion with the allergy specialist    HOW IS A SPT PERFORMED ?    The skin will be disinfected with 70% Isopropanol alcohol, then marked and numbered with a pen to identify the areas where the specific allergens will be tested. Afterwards a drop of the allergen solution will be placed on the skin and then the skin gently pricked using the tip of a specially designed prick needle. With this procedure the most superficial part of the skin will be pierced to allow the test solution to diffuse into the skin and make contact with the reactive immune cells. After 15-30min the area will be examined and evaluated for possible allergic reactions.        WHAT ARE THE POSSIBLE RISKS OF SPT ?    If the skin reacts it will develop red, itchy wheals of 5-30mm diameter. The wheal and itch will usually disappear spontaneously after 1-2 hours. Sometimes there might be a delayed reactions after days and this has to be reported to the treating allergy specialist (could be another kind of reaction pattern and important piece of information). Rarely there are more serious generalized allergic reactions observed and therefore you will be under observation of the allergy team during the entire procedure. There is a small risk for some bleeding and skin infection by the SPT.    _____________________________    INTRADERMAL TESTS      WHAT IS AN INTRADERMAL  TEST (IDT) ?    An intradermal test (IDT) is basically a continuation of the SPT and is sometimes proposed if the skin prick test is negative and the person is strongly suspected to have an allergy to a particular substance. IDT is particularly used for diagnosis of drug allergies and only sterile solutions will be tested by IDT. Because more allergen is delivered to the skin than in SPT the IDT can add more sensitivity to the allergy tests, but with a higher potential risk.     HOW IS AN INTRADERMAL TEST (IDT) PERFORMED ?    A small amount (~ 0.05ml) of the suspected allergen is injected with a very fine insulin needle just beneath the skin. The injections are made at spaced intervals after disinfection and marking of the skin areas. The tests are usually performed on the forearm (rarely back). The initial test will be started with a very diluted solution and if the results are negatives the procedure might be repeated with serial dilutions of higher concentrations. Therefore, the estimated duration of this test is about 45-60 min. Sometimes we observe delayed type reactions to the intradermal tests after 1-4 days and if this is the case take a photo and inform our staff and load the photo into [employer]. Best would be to take the photos with a ruler that we know at which position the positive test was.          WHAT ARE THE POSSIBLE RISKS OF IDT ?    If the skin reacts it will develop red, itchy wheals of 5-30mm diameter. The wheal and itch will usually disappear spontaneously after 1-2 hours. Sometimes there might be a delayed reactions after days and this has to be reported to the treating allergy specialist (could be another kind of reaction pattern and important piece of information). Rarely there are more serious generalized allergic reactions observed and therefore you will be under observation of the allergy team during the entire procedure. Only sterile solutions will be used for injections, but there is  still a small risk for infections or unpredictable local toxic reactions by the allergens. Some transient bleeding might occur.     _____________________________      ORAL PROVOCATION TEST      WHAT IS AN  ORAL PROVOCATION TEST (OPT) ?    An oral provocation test (OPT) is a procedure primarily used to exclude a specific allergy to a particular drug or food. These substances are then administered orally, rarely in case of drugs by subcutaneous or intravenous injections. This test is only conducted if the skin prick and intradermal and/or patch tests were negatives. A formal written consent will be taken prior to the provocation test.         HOW IS AN ORAL PROVOCATION TEST PERFORMED?    For oral (food/drugs) provocation tests you will have to ingest the food or drug hidden in a capsule or in its natural form. The test will usually be placebo-controlled and will include a capsule or other application form not containing the suspected allergen, but non-reactive Mannitol sugar. The various drugs/food will be given at specific time intervals under strict medical supervision.     Two to six serial doses containing the test food or drug will given at normally 30min time intervals, which might vary for each individual. You will be required to stay at the clinic at all times so that the allergy doctors and nurses can early recognize and treat immediately possible allergic reactions. After the last dose you have to stay during at least 1h for observation. The entire procedure will take about 2-6hours, depending on the number of incremental doses and the observation time.     WHAT ARE THE POSSIBLE RISKS OF ORAL PROVOCATION TEST ?    The provocation tests have the highest risk potential of all the tests and therefore close observation is mandatory. The entire procedure will be discussed prior to the test (except when the placebo will be given). The reactions can go from local allergic reactions to more severe generalized  reactions. Therefore, we will not perform provocation tests without prior evaluation by prick or intradermal tests and we plan to exclude an allergy by this test. If there is a higher risk, the test will be performed in a bed and with a secure intravenous access with infusion. The medication of a severe allergic reactions include high dose corticosteroids, antihistamines and if necessary epinephrine (Epi-Pen).    Useful Contact Information    Change of appointments or allergy-related [phone]  Email: [email or web address]  Location/address: [address] [place] [id number]    If you develop any serious or adverse allergic reaction after office hours please seek immediate medical assistance at the nearest clinic or emergency room.   If you have questions or concerns regarding your Allergy Clinic bill or cost of care estimates, please reach out to our financial services at [email or web address]    CPT code for patch testing: [id number]-38227 (Contact your insurance provider to ensure coverage)    Customer Service    Ph: [phone] or  [phone]    Hours of operation:   - 8:00am - 5:30pm  F 9:00am - 4:30pm    Cost of Care/Estimates Team    Ph: [phone]    Hours of operation:  [facility] 8:00am - 3:00pm  F 9:00am - 3:00pm        House Dust Mite Allergy        The house dust mite is an arachnid about 0.3 mm in size and not visible to the naked eye. There are around 150 species of house dust mites in the world. One mite produces up to 40 fecal droppings a day. One teaspoonful of bedroom dust contains an average of nearly 1000 mites and 250,000 minute droppings.    Causes and triggers of house dust mite allergy  The house dust mite requires a warm, moist environment without light in order to live and reproduce. Our beds are ideal. The mite feeds on human and animal skin scales. The allergen is mainly contained in the mite's feces. The feces contain allergy-triggering  constituents which are spread in fine dust, are breathed in and can cause an allergic reaction.    Symptoms  When the allergens come into contact with the mucous membranes in the eyes, nose, mouth and throat, sufferers develop symptoms typical of an allergic cold (allergic rhinitis) or an allergic inflammation of the conjunctiva (allergic conjunctivitis): blocked or runny nose, sneezing, red, itchy eyes. If all of these symptoms are present, then the condition is also known as rhinoconjunctivitis. Often, the upper respiratory tract becomes chronically inflamed, primarily because house dust mites are present all year round.  The symptoms of house dust mite allergy typically occur in the morning and are more frequent in the cold months of the year.    Therapy and treatment  As a first step, mattress, pillows and duvet/comforter should be placed in mite-proof or anti-mite covers, sometimes known as encasings. Alternatively you can use pillows or comforter that can be washed at over 130 F monthly. At the same time, house dust should be minimized. If necessary, the symptoms can be treated with medication, for example antihistamines in the form of nasal sprays, eye drops and tablets. Desensitization/specific immunotherapy (SIT) is recommended for house dust allergy if all the measures above are not sufficient.    Tips and tricks:  Keep room temperature at 66-70 F and relative air humidity at a maximum of 50%.  Ideally, thoroughly air your home two to three times a day for 5 to 10 minutes each time.  Wash bed linens in at least 130 F every week.  Remove stuffed animals or freeze them every other week.  Keep ceiling fans off in the bedroom as they can stir up dust mite allergens.  Remove dust from furniture with a damp cloth and regularly wet mop floors.  Do not put pot and hydroponic plants in the bedroom and also avoid putting too many in living areas, as they increase room humidity.  When staying overnight in other  "accommodations, we recommend taking your own bed linen and the above anti-mite mattress covers with you.  Remove upholstered furniture from the bedroom and consider removing the carpets. Ideally, use sealed parquet or laminate nicol, cork tiles or nicol made of wood, novilon or PVC.  Maybe additionally reduce dust mites in mattress, upholstery, or nicol using hot steam .      Modified from \"House Dust Mite Allergy\" by [employer] [facility].   "

## 2024-08-10 ENCOUNTER — HEALTH MAINTENANCE LETTER (OUTPATIENT)
Age: 59
End: 2024-08-10

## 2024-09-10 ENCOUNTER — MYC MEDICAL ADVICE (OUTPATIENT)
Dept: OTOLARYNGOLOGY | Facility: CLINIC | Age: 59
End: 2024-09-10
Payer: COMMERCIAL

## 2024-09-10 NOTE — TELEPHONE ENCOUNTER
This patient needs to reschedule their appt with Dr. Chavez on Wednesday, 2/12/25 to next available that works for the patient

## 2024-09-25 ENCOUNTER — MYC MEDICAL ADVICE (OUTPATIENT)
Dept: OTOLARYNGOLOGY | Facility: CLINIC | Age: 59
End: 2024-09-25
Payer: COMMERCIAL

## 2024-09-25 NOTE — TELEPHONE ENCOUNTER
[facility] with cancel/rescehdule appt info and call back number to reschedule         This patient needs to reschedule their cancelled appts with Dr. Chavez to next avaialble

## 2024-10-21 ENCOUNTER — PATIENT OUTREACH (OUTPATIENT)
Dept: CARE COORDINATION | Facility: CLINIC | Age: 59
End: 2024-10-21
Payer: COMMERCIAL

## 2024-10-23 ENCOUNTER — OFFICE VISIT (OUTPATIENT)
Dept: FAMILY MEDICINE | Facility: CLINIC | Age: 59
End: 2024-10-23
Payer: COMMERCIAL

## 2024-10-23 VITALS
BODY MASS INDEX: 22.71 KG/M2 | HEART RATE: 51 BPM | RESPIRATION RATE: 16 BRPM | OXYGEN SATURATION: 100 % | TEMPERATURE: 97.5 F | HEIGHT: 67 IN | WEIGHT: 144.7 LBS

## 2024-10-23 DIAGNOSIS — R51.9 NONINTRACTABLE HEADACHE, UNSPECIFIED CHRONICITY PATTERN, UNSPECIFIED HEADACHE TYPE: Primary | ICD-10-CM

## 2024-10-23 PROCEDURE — 99214 OFFICE O/P EST MOD 30 MIN: CPT | Performed by: FAMILY MEDICINE

## 2024-10-23 RX ORDER — PREDNISONE 20 MG/1
40 TABLET ORAL DAILY
Qty: 10 TABLET | Refills: 0 | Status: SHIPPED | OUTPATIENT
Start: 2024-10-23 | End: 2024-10-28

## 2024-10-23 ASSESSMENT — ENCOUNTER SYMPTOMS: HEADACHES: 1

## 2024-10-23 NOTE — PROGRESS NOTES
"  Assessment & Plan     Nonintractable headache, unspecified chronicity pattern, unspecified headache type  Unclear etiology for headaches. May be daily tension headaches or due to musculoskeletal cause. However, given positional nature, her age, and changing headaches, will proceed with MRI to rule out any intracranial etiology. Will also place referral to neurology. Try to limit Tylenol/ibuprofen as they can contribute to rebound headaches. Try short course of prednisone instead. Follow up if any worsening symptoms.  - MR Brain w/o Contrast; Future  - predniSONE (DELTASONE) 20 MG tablet; Take 2 tablets (40 mg) by mouth daily for 5 days.  - Adult Neurology  Referral; Future    Subjective   Evangelina is a 58 year old, presenting for the following health issues:  Headache (Feels like pressure starts sometime while lying down)    History of Present Illness       Headaches:   Since the patient's last clinic visit, headaches are: worsened  The patient is getting headaches:  Almost everry day  She is not able to do normal daily activities when she has a migraine.  The patient is taking the following rescue/relief medications:  Ibuprofen (Advil, Motrin), Naproxyn (Aleve) and Excedrin   Patient states \"I get only a small amount of relief\" from the rescue/relief medications.   The patient is taking the following medications to prevent migraines:  No medications to prevent migraines  In the past 4 weeks, the patient has gone to an Urgent Care or Emergency Room 0 times times due to headaches.   She is taking medications regularly.     About a year ago, had some headaches - were sharp -- had evaluation, negative brain MRI - saw headache clinic - had negative MRI. She was seen at [facility]. She did physical therapy. Symptoms did go away.    Current headaches started in the Spring and are different from previous headaches. She describes the pain as pressure. Feels like head could explode. Started doing " "exercises without improvement. HA are daily and worse when she lays down. Feels pain from the back of her head to the front. Excedrin helps a little so she can function but also upsets her stomach. She takes Tylenol or ibuprofen every day to every other day. She did try stopping this     She does notice some lightheadedness when standing up.She will experience some nausea. No vision changes. She does notice some sound  She was seen by dentist last week and didn't find evidence of TMJ. Denies any numbness, tingling, or weakness in her arms. Denies any tearing, running nose, congestion. Tried using Neti pot without any relief.     She is a  and sometimes headache will feel worse. Headaches will wake her up from sleep and she wakes up with headaches as well.           Review of Systems  Constitutional, HEENT, cardiovascular, pulmonary, gi and gu systems are negative, except as otherwise noted.      Objective    Pulse 51   Temp 97.5  F (36.4  C) (Tympanic)   Resp 16   Ht 1.702 m (5' 7\")   Wt 65.6 kg (144 lb 11.2 oz)   LMP 05/15/2019   SpO2 100%   BMI 22.66 kg/m    Body mass index is 22.66 kg/m .  Physical Exam   GENERAL: alert and no distress  EYES: Eyes grossly normal to inspection  HENT: ear canals and TM's normal, nose and mouth without ulcers or lesions  NECK: no adenopathy, no asymmetry, masses, or scars  RESP: lungs clear to auscultation - no rales, rhonchi or wheezes  CV: regular rates and rhythm, normal S1 S2, no S3 or S4, and no murmur, click or rub  MS: no gross musculoskeletal defects noted, no edema  NEURO: Normal strength and tone, sensory exam grossly normal, mentation intact, and cranial nerves 2-12 intact        The longitudinal plan of care for the diagnosis(es)/condition(s) as documented were addressed during this visit. Due to the added complexity in care, I will continue to support Evangelina in the subsequent management and with ongoing continuity of care.      Signed " Electronically by: Zane Watt, DO

## 2024-10-24 ASSESSMENT — HEADACHE IMPACT TEST (HIT 6)
WHEN YOU HAVE A HEADACHE HOW OFTEN DO YOU WISH YOU COULD LIE DOWN: SOMETIMES
HIT6 TOTAL SCORE: 69
HOW OFTEN DO HEADACHES LIMIT YOUR DAILY ACTIVITIES: VERY OFTEN
WHEN YOU HAVE HEADACHES HOW OFTEN IS THE PAIN SEVERE: VERY OFTEN
HOW OFTEN HAVE YOU FELT FED UP OR IRRITATED BECAUSE OF YOUR HEADACHES: ALWAYS
HOW OFTEN DID HEADACHS LIMIT CONCENTRATION ON WORK OR DAILY ACTIVITY: VERY OFTEN
HOW OFTEN HAVE YOU FELT TOO TIRED TO WORK BECAUSE OF YOUR HEADACHES: ALWAYS

## 2024-10-24 ASSESSMENT — MIGRAINE DISABILITY ASSESSMENT (MIDAS)
HOW MANY DAYS WAS YOUR PRODUCTIVITY CUT IN HALF BECAUSE OF HEADACHES: 45
HOW MANY DAYS DID YOU MISS WORK OR SCHOOL BECAUSE OF HEADACHES: 2
HOW MANY DAYS WAS HOUSEWORK PRODUCTIVITY CUT IN HALF DUE TO HEADACHES: 45
TOTAL SCORE: 142
HOW OFTEN WERE SOCIAL ACTIVITIES MISSED DUE TO HEADACHES: 5
HOW MANY DAYS DID YOU NOT DO HOUSEWORK BECAUSE OF HEADACHES: 45
ON A SCALE FROM 0-10 ON AVERAGE HOW PAINFUL WERE HEADACHES: 9
HOW MANY DAYS IN THE PAST 3 MONTHS HAVE YOU HAD A HEADACHE: 60

## 2024-10-25 ENCOUNTER — OFFICE VISIT (OUTPATIENT)
Dept: NEUROLOGY | Facility: CLINIC | Age: 59
End: 2024-10-25
Attending: FAMILY MEDICINE
Payer: COMMERCIAL

## 2024-10-25 ENCOUNTER — TELEPHONE (OUTPATIENT)
Dept: NEUROLOGY | Facility: CLINIC | Age: 59
End: 2024-10-25

## 2024-10-25 VITALS
WEIGHT: 140.6 LBS | BODY MASS INDEX: 22.02 KG/M2 | HEART RATE: 56 BPM | OXYGEN SATURATION: 98 % | SYSTOLIC BLOOD PRESSURE: 112 MMHG | DIASTOLIC BLOOD PRESSURE: 70 MMHG

## 2024-10-25 DIAGNOSIS — G43.009 MIGRAINE WITHOUT AURA AND WITHOUT STATUS MIGRAINOSUS, NOT INTRACTABLE: Primary | ICD-10-CM

## 2024-10-25 DIAGNOSIS — R51.9 NONINTRACTABLE HEADACHE, UNSPECIFIED CHRONICITY PATTERN, UNSPECIFIED HEADACHE TYPE: ICD-10-CM

## 2024-10-25 DIAGNOSIS — G43.009 MIGRAINE WITHOUT AURA AND WITHOUT STATUS MIGRAINOSUS, NOT INTRACTABLE: ICD-10-CM

## 2024-10-25 PROCEDURE — 99417 PROLNG OP E/M EACH 15 MIN: CPT | Performed by: PSYCHIATRY & NEUROLOGY

## 2024-10-25 PROCEDURE — 99205 OFFICE O/P NEW HI 60 MIN: CPT | Performed by: PSYCHIATRY & NEUROLOGY

## 2024-10-25 RX ORDER — AMITRIPTYLINE HYDROCHLORIDE 10 MG/1
TABLET ORAL
Qty: 70 TABLET | Refills: 0 | Status: SHIPPED | OUTPATIENT
Start: 2024-10-25

## 2024-10-25 RX ORDER — AMITRIPTYLINE HYDROCHLORIDE 10 MG/1
TABLET ORAL
Qty: 66 TABLET | Refills: 0 | Status: SHIPPED | OUTPATIENT
Start: 2024-10-25 | End: 2024-10-25

## 2024-10-25 RX ORDER — AMITRIPTYLINE HYDROCHLORIDE 50 MG/1
50 TABLET ORAL AT BEDTIME
Qty: 30 TABLET | Refills: 3 | Status: SHIPPED | OUTPATIENT
Start: 2024-11-25

## 2024-10-25 RX ORDER — RIZATRIPTAN BENZOATE 10 MG/1
10 TABLET ORAL
Qty: 18 TABLET | Refills: 3 | Status: SHIPPED | OUTPATIENT
Start: 2024-10-25

## 2024-10-25 NOTE — PROGRESS NOTES
[facility]   Headache Neurology Consult  October 25, 2024     Evangelina De Anda MRN# [id number]   YOB: 1965 Age: 58 year old     Requesting provider:     Zane Watt DO            Assessment and Recommendations:     Evangelina De Anda is a 58 year old female with a history of intermittent migraine with periods of daily chronic headaches in the past, who presents for evaluation of both worsening headaches with migrainous features, again daily and now new intermittent tripping with the right foot in the setting of bilateral lower extremity spasms. She has a normal neurologic exam today, no evidence of spasticity or foot drop or minimal weakness in the bilateral lower extremities.    Prior MRI brain reviewed from 12/6/22 reviewed and there are small T2 hyperintensities consistent with small vessel ischemic disease, none paraventricular, juxtacortical, corpus callosal or infratentorial as would be the case for MS. Recommended starting with a brain and cervical spine MRI with and without contrast given history of MS in her mother. If normal, could consider thoracic MRI depending on symptomatic progression.    Headache treatment plan:  Acute medication recommendations:  Rizatriptan 10mg - may repeat at second dose  2 hours after the initial dose. Limit to no more than 9 days per month of use     Preventative medication recommendations:  Topiramate or beta blocker would not be recommended given history of renal stones and heart rate of 56.  Will start with amitriptyline and see if this is not too fatiguing with her early work hours.  Reviewed common side effects of this medication today.  If not tolerated, next line would be a CGRP antagonist     Will follow with her in 3 months.    Total time spent today was 75 minutes in chart review, history, exam and counseling.      Shayy Alicea MD  Neurology            Chief Complaint:     Chief Complaint   Patient presents  with    Consult For     Headaches            History is obtained from the patient and medical record.      Evangelina De Anda is a 58 year old female whose first headache was in her 30s she had a series of migraine headaches. She is a  and teaches kickboxing.    She then had headaches later that were associated with elevated blood pressures.    2 years ago, started to have sharp migrainous headaches, these were present daily at that time. She had an MRI and was sent to [facility] and then after physical therapy, they remitted.    She has about 10 severe days per month.    March 2024, she had a headache on awakening and then took Excedrin and then they remitted. She continued the exercises, and had worsening of the pain. She has noted that they are associated with pressure upon awakening with headache. She used a neti-pot, thinking associated with sinusitis or allergies. They started to occur nightly, then they would not remit.   These are present at times at the temples and in the occipital region. It can be bilateral. She will feel pain in her jaw with lying in bed, it tends to move. There is some throbbing to the pain, but mainly it is a pressure type pain. There is no ice pick or neuralgiform pain typically.  She will switch pillows and get moments of relief. It will be the occipital pain on the posterior head and will roll onto one side and pain will roll with her, and ultimately awaken her again. She has not had to miss work. 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 It is better with activity, but still present. No photophobia, frankly, but there is some phonophobia. There has been some nausea, and she has not vomited. There has been some motion sickness on an isolated occasion. There was osmophobia at that time.     No vision blurring, there is monovision with her lenses. She has an eye doctor appt coming up. No diplopia or sudden vision loss. She did in her 30s, have a  missing spot or butterflies floating and was a scotoma with missing vision in it. This occurred about 4 times. She has not had it occur since March.    She has been trying not to take too much tylenol or ibuprofen. Physical activity helps the pain.   The other day, she noticed that she was tripping several times in one restaurant and this occurred 4 times, none since Monday night, Saturday night as well. She has been teaching fitness for a long time. She has used a theragun and therapressure legs. She has had what she thought was RLS for a year now, and when she starts to relax, she has noticed a muscle spasm that works its way down the spine, starting in the right or left legs. Now, this will awaken her in the middle of the night. The lack of sleep is wearing on her emotionally. Magnesium has helped this pain in the past. She sees a chiropractor monthly and there are high velocity neck manipulations involved in this.    Her mother had MS and was diagnosed at age 47-48 and at the time, she was initially falling. She lost all mobility from the chest down. She passed away in  from MS.    No fevers, night sweats (currently taking estradiol, progesterone and testosterone cream) and levels are normal), some loss of appetite since the past month, and she has started to take prednisone for status migrainosus, no unexplained weight loss.            Past Medical History:     Past Medical History:   Diagnosis Date    Alcohol abuse, in remission     Endometrial cells on cervical Pap smear inconsistent w/LMP 2012    endo bx WNL    MAYRA II (vulvar intraepithelial neoplasia II) 2012   Kidney stone          Past Surgical History:     Past Surgical History:   Procedure Laterality Date    APPENDECTOMY      age 6 yrs.    COLONOSCOPY  2015    Dr. Estella LAROSE    EXCISE VULVA WIDE LOCAL  7/15/2014    Procedure: EXCISE VULVA WIDE LOCAL;  Surgeon: Melinda Whitten DO;  Location: [facility] OR    GYN SURGERY           LAPAROSCOPIC SALPINGECTOMY Bilateral 2019    Procedure: Laparoscopic bilateral salpingectomy;  Surgeon: Melinda Whitten DO;  Location: [facility] OR    SURGICAL HISTORY OF -   2002    C section    TONSILLECTOMY      T&A as a child    TUBAL LIGATION  2019             Social History:     Social History     Socioeconomic History    Marital status:      Spouse name: Not on file    Number of children: Not on file    Years of education: Not on file    Highest education level: Not on file   Occupational History    Occupation: members activities instructor; 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     Employer: [employer]   Tobacco Use    Smoking status: Former     Current packs/day: 1 cigarette daily for 5 years     Types: Cigarettes     Quit date: 2019     Years since quittin.2    Smokeless tobacco: Never    Tobacco comments:     1 cig/day    Vaping Use    Vaping status: Never Used   Substance and Sexual Activity    Alcohol use: No     Alcohol/week: 0.0 standard drinks of alcohol     Comment: stopped 2014    Drug use: No    Sexual activity: Yes     Partners: Male   Other Topics Concern     Service Not Asked    Blood Transfusions Not Asked    Caffeine Concern Not Asked    Occupational Exposure Not Asked    Hobby Hazards Not Asked    Sleep Concern Not Asked    Stress Concern Not Asked    Weight Concern Not Asked    Special Diet No     Comment: low salt. tries to eat healthy. not as much dairy.    Back Care Not Asked    Exercise Yes    Bike Helmet Not Asked    Seat Belt Not Asked    Self-Exams Not Asked    Parent/sibling w/ CABG, MI or angioplasty before 65F 55M? Yes     Comment: sister  at 38 of heart attack   Social History Narrative     -single, working 2 jobs , 11.6 yo daughter, no pets , fiance     Social Drivers of Health     Financial Resource Strain: Low Risk  (2023)    Financial Resource Strain     Within the past 12 months, have you or your family members you live with been  unable to get utilities (heat, electricity) when it was really needed?: No   Food Insecurity: Low Risk  (2023)    Food Insecurity     Within the past 12 months, did you worry that your food would run out before you got money to buy more?: No     Within the past 12 months, did the food you bought just not last and you didn t have money to get more?: No   Transportation Needs: Low Risk  (2023)    Transportation Needs     Within the past 12 months, has lack of transportation kept you from medical appointments, getting your medicines, non-medical meetings or appointments, work, or from getting things that you need?: No   Physical Activity: Not on file   Stress: Not on file   Social Connections: Not on file   Interpersonal Safety: Low Risk  (10/23/2024)    Interpersonal Safety     Do you feel physically and emotionally safe where you currently live?: Yes     Within the past 12 months, have you been hit, slapped, kicked or otherwise physically hurt by someone?: No     Within the past 12 months, have you been humiliated or emotionally abused in other ways by your partner or ex-partner?: No   Housing Stability: Low Risk  (2023)    Housing Stability     Do you have housing? : Yes     Are you worried about losing your housing?: No             Family History:     Family History   Problem Relation Age of Onset    C.A.D. Father 65        heart attack and quadruble bypass    Cancer Father         spleen    Heart Disease Father     Hypertension Paternal Grandmother     Cerebrovascular Disease Maternal Grandmother     Cancer - colorectal Mother         in her 50's.    Neurologic Disorder Mother         MS- at age of 65    Heart Disease Sister 38         from massive heart attack at age of 38    Family History Negative Brother     Family History Negative Sister    Mother with headaches and MS          Allergies:    No Known Allergies          Medications:   Acute headache medications:    Preventative  headache medications:      Current Outpatient Medications:     estradiol (ESTRACE) 1 MG tablet, Take 1 tablet by mouth daily at 2 pm, Disp: , Rfl:     predniSONE (DELTASONE) 20 MG tablet, Take 2 tablets (40 mg) by mouth daily for 5 days., Disp: 10 tablet, Rfl: 0    progesterone (PROMETRIUM) 100 MG capsule, Take 100 mg by mouth at bedtime, Disp: , Rfl:     UNABLE TO FIND, MEDICATION NAME: Testosterone cream (prescribed by Jayjay Jack), Disp: , Rfl:           Physical Exam:   /70 (BP Location: Right arm, Patient Position: Sitting, Cuff Size: Adult Regular)   Pulse 56   Wt 63.8 kg (140 lb 9.6 oz)   LMP 05/15/2019   SpO2 98%   BMI 22.02 kg/m     Physical Exam:   Neurologic:   Mental Status Exam: Alert, awake and oriented to situation. No dysarthria. Speech of normal fluency.   Cranial Nerves: Fundoscopic exam with clear disc margins bilaterally in the left, not as clear on the right. PERRLA without RAPD, EOMs intact, no nystagmus, facial movements symmetric, facial sensation intact to light touch, hearing intact to conversation, trapezius and SCMs 5/5 bilaterally, tongue midline and fully mobile. No tongue atrophy.    Motor: Normal tone in all four extremities, no atrophy. 5/5 strength bilaterally in shoulder abduction, elbow extensors and flexors, wrist extensors and flexors, hip flexors, knee extensors and flexors, dorsi- and plantarflexion. Normal EHL and toe flexors.   Sensory: Sensation intact to pinprick and vibration sensation on arms and legs bilaterally.    Coordination: Finger-nose-finger intact bilaterally.  Rapidly alternating movements intact bilaterally in the upper extremities.  Normal finger tapping bilaterally.  Intact heel-shin bilaterally.    Reflexes: 2+ and symmetric in triceps, biceps, brachioradialis, patellar, Achilles, and plantars downgoing bilaterally.   Gait: Normal gait.  Able to toe and heel walk.  Tandem gait normal.  Head: Normocephalic, atraumatic.   Eyes: No conjunctival  injection, no scleral icterus.           Data:     MRI BRAIN WITHOUT CONTRAST  12/6/2022 3:56 PM     HISTORY:  Headache, new daily persistent ([facility])     TECHNIQUE:  Multiplanar, multisequence MRI of the brain without  gadolinium IV contrast material.       COMPARISON:  None.     FINDINGS:   Scattered nonspecific frontoparietal prominent white matter T2  hyperintensities are present which likely represent mild chronic small  vessel ischemic change. No evidence of recent ischemia, hemorrhage,  mass, mass effect, or hydrocephalus.     Marrow signal is within normal limits. Mild paranasal sinus mucosal  thickening. The visualized tympanic and mastoid cavities are  unremarkable.                                                                      IMPRESSION:     1. No acute abnormality.  2. Scattered nonspecific white matter T2 hyperintensities likely  represent chronic  small vessel ischemic change.     CAROL JORDAN MD

## 2024-10-25 NOTE — TELEPHONE ENCOUNTER
Health Call Center    Phone Message    May a detailed message be left on voicemail: yes     Reason for Call: Medication Question or concern regarding medication   Prescription Clarification  Name of Medication: amitriptyline (ELAVIL) 10 MG tablet  Prescribing Provider: Dr Alicea   Pharmacy: [facility] [address] - [address]   What on the order needs clarification?   Saurabh, with [facility], calling to states that the above RX should have a qty to dispense of #210 tablets based on the directions, however it was sent over with a qty of #66 tablets. Requesting new RX sent with updated qty.     Action Taken: Message routed to:  Other: [facility]    Travel Screening: Not Applicable

## 2024-10-25 NOTE — LETTER
10/25/2024      Evangelina De Anda  [id number]  [facility] [address]      Dear Colleague,    Thank you for referring your patient, Evangelina De Anda, to the [facility] NEUROLOGY CLINIC [facility]. Please see a copy of my visit note below.    [facility]   Headache Neurology Consult  October 25, 2024     Evangelina De Anda MRN# [id number]   YOB: 1965 Age: 58 year old     Requesting provider:     Zane Watt DO            Assessment and Recommendations:     Evangelina De Anda is a 58 year old female with a history of intermittent migraine with periods of daily chronic headaches in the past, who presents for evaluation of both worsening headaches with migrainous features, again daily and now new intermittent tripping with the right foot in the setting of bilateral lower extremity spasms. She has a normal neurologic exam today, no evidence of spasticity or foot drop or minimal weakness in the bilateral lower extremities.    Prior MRI brain reviewed from 12/6/22 reviewed and there are small T2 hyperintensities consistent with small vessel ischemic disease, none paraventricular, juxtacortical, corpus callosal or infratentorial as would be the case for MS. Recommended starting with a brain and cervical spine MRI with and without contrast given history of MS in her mother. If normal, could consider thoracic MRI depending on symptomatic progression.    Headache treatment plan:  Acute medication recommendations:  Rizatriptan 10mg - may repeat at second dose  2 hours after the initial dose. Limit to no more than 9 days per month of use     Preventative medication recommendations:  Topiramate or beta blocker would not be recommended given history of renal stones and heart rate of 56.  Will start with amitriptyline and see if this is not too fatiguing with her early work hours.  Reviewed common side effects of this medication today.  If not tolerated, next line  would be a CGRP antagonist     Will follow with her in 3 months.    Total time spent today was 75 minutes in chart review, history, exam and counseling.      Shayy Alicea MD  Neurology            Chief Complaint:     Chief Complaint   Patient presents with     Consult For     Headaches            History is obtained from the patient and medical record.      Evangelina De Anda is a 58 year old female whose first headache was in her 30s she had a series of migraine headaches. She is a  and teaches kickboxing.    She then had headaches later that were associated with elevated blood pressures.    2 years ago, started to have sharp migrainous headaches, these were present daily at that time. She had an MRI and was sent to [facility] and then after physical therapy, they remitted.    She has about 10 severe days per month.    March 2024, she had a headache on awakening and then took Excedrin and then they remitted. She continued the exercises, and had worsening of the pain. She has noted that they are associated with pressure upon awakening with headache. She used a neti-pot, thinking associated with sinusitis or allergies. They started to occur nightly, then they would not remit.   These are present at times at the temples and in the occipital region. It can be bilateral. She will feel pain in her jaw with lying in bed, it tends to move. There is some throbbing to the pain, but mainly it is a pressure type pain. There is no ice pick or neuralgiform pain typically.  She will switch pillows and get moments of relief. It will be the occipital pain on the posterior head and will roll onto one side and pain will roll with her, and ultimately awaken her again. She has not had to miss work. 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 It is better with activity, but still present. No photophobia, frankly, but there is some phonophobia. There has been some nausea, and she has not vomited.  There has been some motion sickness on an isolated occasion. There was osmophobia at that time.     No vision blurring, there is monovision with her lenses. She has an eye doctor appt coming up. No diplopia or sudden vision loss. She did in her 30s, have a missing spot or butterflies floating and was a scotoma with missing vision in it. This occurred about 4 times. She has not had it occur since March.    She has been trying not to take too much tylenol or ibuprofen. Physical activity helps the pain.   The other day, she noticed that she was tripping several times in one restaurant and this occurred 4 times, none since Monday night, Saturday night as well. She has been teaching fitness for a long time. She has used a theragun and therapressure legs. She has had what she thought was RLS for a year now, and when she starts to relax, she has noticed a muscle spasm that works its way down the spine, starting in the right or left legs. Now, this will awaken her in the middle of the night. The lack of sleep is wearing on her emotionally. Magnesium has helped this pain in the past. She sees a chiropractor monthly and there are high velocity neck manipulations involved in this.    Her mother had MS and was diagnosed at age 47-48 and at the time, she was initially falling. She lost all mobility from the chest down. She passed away in 2002 from MS.    No fevers, night sweats (currently taking estradiol, progesterone and testosterone cream) and levels are normal), some loss of appetite since the past month, and she has started to take prednisone for status migrainosus, no unexplained weight loss.            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     COLONOSCOPY  2015    Dr. Estella LAROSE     EXCISE VULVA WIDE LOCAL  7/15/2014    Procedure: EXCISE VULVA WIDE LOCAL;  Surgeon: Melinda Whitten DO;  Location: [facility] OR     GYN SURGERY           LAPAROSCOPIC SALPINGECTOMY Bilateral 2019    Procedure: Laparoscopic bilateral salpingectomy;  Surgeon: Melinda Whitten DO;  Location: [facility] OR     SURGICAL HISTORY OF -       C section     TONSILLECTOMY      T&A as a child     TUBAL LIGATION  2019             Social History:     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     Employer: [employer]   Tobacco Use     Smoking status: Former     Current packs/day: 1 cigarette daily for 5 years     Types: Cigarettes     Quit date: 2019     Years since quittin.2     Smokeless tobacco: Never     Tobacco comments:     1 cig/day    Vaping Use     Vaping status: Never Used   Substance and Sexual Activity     Alcohol use: No     Alcohol/week: 0.0 standard drinks of alcohol     Comment: stopped 2014     Drug use: No     Sexual activity: Yes     Partners: Male   Other Topics Concern      Service Not Asked     Blood Transfusions Not Asked     Caffeine Concern Not Asked     Occupational Exposure Not Asked     Hobby Hazards Not Asked     Sleep Concern Not Asked     Stress Concern Not Asked     Weight Concern Not Asked     Special Diet No     Comment: low salt. tries to eat healthy. not as much dairy.     Back Care Not Asked     Exercise Yes     Bike Helmet Not Asked     Seat Belt Not Asked     Self-Exams Not Asked     Parent/sibling w/ CABG, MI or angioplasty before 65F 55M? Yes     Comment: sister  at 38 of heart attack    Social History Narrative     -single, working 2 jobs , 11.6 yo daughter, no pets , fiance     Social Drivers of Health     Financial Resource Strain: Low Risk  (11/16/2023)    Financial Resource Strain      Within the past 12 months, have you or your family members you live with been unable to get utilities (heat, electricity) when it was really needed?: No   Food Insecurity: Low Risk  (11/16/2023)    Food Insecurity      Within the past 12 months, did you worry that your food would run out before you got money to buy more?: No      Within the past 12 months, did the food you bought just not last and you didn t have money to get more?: No   Transportation Needs: Low Risk  (11/16/2023)    Transportation Needs      Within the past 12 months, has lack of transportation kept you from medical appointments, getting your medicines, non-medical meetings or appointments, work, or from getting things that you need?: No   Physical Activity: Not on file   Stress: Not on file   Social Connections: Not on file   Interpersonal Safety: Low Risk  (10/23/2024)    Interpersonal Safety      Do you feel physically and emotionally safe where you currently live?: Yes      Within the past 12 months, have you been hit, slapped, kicked or otherwise physically hurt by someone?: No      Within the past 12 months, have you been humiliated or emotionally abused in other ways by your partner or ex-partner?: No   Housing Stability: Low Risk  (11/16/2023)    Housing Stability      Do you have housing? : Yes      Are you worried about losing your housing?: No             Family History:     Family History   Problem Relation Age of Onset     C.A.D. Father 65        heart attack and quadruble bypass     Cancer Father         spleen     Heart Disease Father      Hypertension Paternal Grandmother      Cerebrovascular Disease Maternal Grandmother      Cancer - colorectal Mother         in her 50's.     Neurologic Disorder Mother          MS- at age of 65     Heart Disease Sister 38         from massive heart attack at age of 38     Family History Negative Brother      Family History Negative Sister    Mother with headaches and MS          Allergies:    No Known Allergies          Medications:   Acute headache medications:    Preventative headache medications:      Current Outpatient Medications:      estradiol (ESTRACE) 1 MG tablet, Take 1 tablet by mouth daily at 2 pm, Disp: , Rfl:      predniSONE (DELTASONE) 20 MG tablet, Take 2 tablets (40 mg) by mouth daily for 5 days., Disp: 10 tablet, Rfl: 0     progesterone (PROMETRIUM) 100 MG capsule, Take 100 mg by mouth at bedtime, Disp: , Rfl:      UNABLE TO FIND, MEDICATION NAME: Testosterone cream (prescribed by Jayjay Jack), Disp: , Rfl:           Physical Exam:   /70 (BP Location: Right arm, Patient Position: Sitting, Cuff Size: Adult Regular)   Pulse 56   Wt 63.8 kg (140 lb 9.6 oz)   LMP 05/15/2019   SpO2 98%   BMI 22.02 kg/m     Physical Exam:   Neurologic:   Mental Status Exam: Alert, awake and oriented to situation. No dysarthria. Speech of normal fluency.   Cranial Nerves: Fundoscopic exam with clear disc margins bilaterally in the left, not as clear on the right. PERRLA without RAPD, EOMs intact, no nystagmus, facial movements symmetric, facial sensation intact to light touch, hearing intact to conversation, trapezius and SCMs 5/5 bilaterally, tongue midline and fully mobile. No tongue atrophy.    Motor: Normal tone in all four extremities, no atrophy. 5/5 strength bilaterally in shoulder abduction, elbow extensors and flexors, wrist extensors and flexors, hip flexors, knee extensors and flexors, dorsi- and plantarflexion. Normal EHL and toe flexors.   Sensory: Sensation intact to pinprick and vibration sensation on arms and legs bilaterally.    Coordination: Finger-nose-finger intact bilaterally.  Rapidly alternating movements intact bilaterally in the upper extremities.   Normal finger tapping bilaterally.  Intact heel-shin bilaterally.    Reflexes: 2+ and symmetric in triceps, biceps, brachioradialis, patellar, Achilles, and plantars downgoing bilaterally.   Gait: Normal gait.  Able to toe and heel walk.  Tandem gait normal.  Head: Normocephalic, atraumatic.   Eyes: No conjunctival injection, no scleral icterus.           Data:     MRI BRAIN WITHOUT CONTRAST  12/6/2022 3:56 PM     HISTORY:  Headache, new daily persistent ([facility])     TECHNIQUE:  Multiplanar, multisequence MRI of the brain without  gadolinium IV contrast material.       COMPARISON:  None.     FINDINGS:   Scattered nonspecific frontoparietal prominent white matter T2  hyperintensities are present which likely represent mild chronic small  vessel ischemic change. No evidence of recent ischemia, hemorrhage,  mass, mass effect, or hydrocephalus.     Marrow signal is within normal limits. Mild paranasal sinus mucosal  thickening. The visualized tympanic and mastoid cavities are  unremarkable.                                                                      IMPRESSION:     1. No acute abnormality.  2. Scattered nonspecific white matter T2 hyperintensities likely  represent chronic  small vessel ischemic change.     CAROL JORDAN MD          Again, thank you for allowing me to participate in the care of your patient.        Sincerely,        Shayy Alicea MD

## 2024-10-25 NOTE — TELEPHONE ENCOUNTER
Qty needs to be increased to 70. RN also clarified directions in note to pharmacy.     Please review and sign.     Tejinder CORBETT RN, BSN  [facility] Neurology

## 2024-10-28 ENCOUNTER — PATIENT OUTREACH (OUTPATIENT)
Dept: CARE COORDINATION | Facility: CLINIC | Age: 59
End: 2024-10-28
Payer: COMMERCIAL

## 2024-10-30 ENCOUNTER — PATIENT OUTREACH (OUTPATIENT)
Dept: CARE COORDINATION | Facility: CLINIC | Age: 59
End: 2024-10-30
Payer: COMMERCIAL

## 2024-11-12 ENCOUNTER — HOSPITAL ENCOUNTER (OUTPATIENT)
Dept: MRI IMAGING | Facility: CLINIC | Age: 59
Discharge: HOME | End: 2024-11-12
Attending: PSYCHIATRY & NEUROLOGY | Admitting: PSYCHIATRY & NEUROLOGY
Payer: COMMERCIAL

## 2024-11-12 DIAGNOSIS — R51.9 NONINTRACTABLE HEADACHE, UNSPECIFIED CHRONICITY PATTERN, UNSPECIFIED HEADACHE TYPE: ICD-10-CM

## 2024-11-12 PROCEDURE — 255N000002 HC RX 255 OP 636: Performed by: PSYCHIATRY & NEUROLOGY

## 2024-11-12 PROCEDURE — 70553 MRI BRAIN STEM W/O & W/DYE: CPT

## 2024-11-12 PROCEDURE — A9585 GADOBUTROL INJECTION: HCPCS | Performed by: PSYCHIATRY & NEUROLOGY

## 2024-11-12 PROCEDURE — 72156 MRI NECK SPINE W/O & W/DYE: CPT

## 2024-11-12 RX ORDER — GADOBUTROL 604.72 MG/ML
6 INJECTION INTRAVENOUS ONCE
Status: COMPLETED | OUTPATIENT
Start: 2024-11-12 | End: 2024-11-12

## 2024-11-12 RX ADMIN — GADOBUTROL 6 ML: 604.72 INJECTION INTRAVENOUS at 18:57

## 2024-11-15 ENCOUNTER — MYC MEDICAL ADVICE (OUTPATIENT)
Dept: NEUROLOGY | Facility: CLINIC | Age: 59
End: 2024-11-15

## 2024-11-15 NOTE — TELEPHONE ENCOUNTER
Sent patient message in ARKeX relaying the message below.     Ayana WELLINGTON RN, BSN  [facility]

## 2024-11-15 NOTE — TELEPHONE ENCOUNTER
See pt message below. Can you advise on imaging results?     Do you want patient to follow up to discuss medications? Per last office visit, CGRP antagonist is next recommended step.     Ayana MARTINEZ RN  [facility] Neurology

## 2024-11-15 NOTE — TELEPHONE ENCOUNTER
Yes, please help to set up an appointment to review results and discuss CGRP antagonist side effects.

## 2024-11-18 ENCOUNTER — PATIENT OUTREACH (OUTPATIENT)
Dept: CARE COORDINATION | Facility: CLINIC | Age: 59
End: 2024-11-18
Payer: COMMERCIAL

## 2024-11-18 NOTE — RESULT ENCOUNTER NOTE
Noted the T2 hyperintensities noted, and not specific for evidence of MS. Noted mild paranasal sinus thickening.   Regarding cervical MRI, the mild spinal canal stenosis at C5-6 and severe left foraminal stenosis, marrow edema at the disc joint in this area. Will discuss these findings at our next visit.

## 2024-12-11 ASSESSMENT — HEADACHE IMPACT TEST (HIT 6)
HOW OFTEN DO HEADACHES LIMIT YOUR DAILY ACTIVITIES: VERY OFTEN
WHEN YOU HAVE HEADACHES HOW OFTEN IS THE PAIN SEVERE: VERY OFTEN
HOW OFTEN DID HEADACHS LIMIT CONCENTRATION ON WORK OR DAILY ACTIVITY: VERY OFTEN
HIT6 TOTAL SCORE: 66
HOW OFTEN HAVE YOU FELT TOO TIRED TO WORK BECAUSE OF YOUR HEADACHES: VERY OFTEN
HOW OFTEN HAVE YOU FELT FED UP OR IRRITATED BECAUSE OF YOUR HEADACHES: VERY OFTEN
WHEN YOU HAVE A HEADACHE HOW OFTEN DO YOU WISH YOU COULD LIE DOWN: VERY OFTEN

## 2024-12-11 ASSESSMENT — MIGRAINE DISABILITY ASSESSMENT (MIDAS)
ON A SCALE FROM 0-10 ON AVERAGE HOW PAINFUL WERE HEADACHES: 8
HOW OFTEN WERE SOCIAL ACTIVITIES MISSED DUE TO HEADACHES: 10
HOW MANY DAYS IN THE PAST 3 MONTHS HAVE YOU HAD A HEADACHE: 10
HOW MANY DAYS WAS YOUR PRODUCTIVITY CUT IN HALF BECAUSE OF HEADACHES: 10
HOW MANY DAYS WAS HOUSEWORK PRODUCTIVITY CUT IN HALF DUE TO HEADACHES: 10
TOTAL SCORE: 43
HOW MANY DAYS DID YOU MISS WORK OR SCHOOL BECAUSE OF HEADACHES: 3
HOW MANY DAYS DID YOU NOT DO HOUSEWORK BECAUSE OF HEADACHES: 10

## 2024-12-12 ENCOUNTER — OFFICE VISIT (OUTPATIENT)
Dept: NEUROLOGY | Facility: CLINIC | Age: 59
End: 2024-12-12
Payer: COMMERCIAL

## 2024-12-12 VITALS
WEIGHT: 140 LBS | HEIGHT: 67 IN | DIASTOLIC BLOOD PRESSURE: 72 MMHG | OXYGEN SATURATION: 100 % | HEART RATE: 61 BPM | SYSTOLIC BLOOD PRESSURE: 116 MMHG | BODY MASS INDEX: 21.97 KG/M2

## 2024-12-12 DIAGNOSIS — G44.86 CERVICOGENIC HEADACHE: Primary | ICD-10-CM

## 2024-12-12 NOTE — LETTER
12/12/2024      Evangelina De Anda  [address]  [facility] [address]      Dear Colleague,    Thank you for referring your patient, Evangelina De Anda, to the [facility] NEUROLOGY CLINICS [facility]. Please see a copy of my visit note below.    [facility]    Headache Neurology Progress Note  December 12, 2024    Assessment/Plan:   Evangelina De Anda is a 58 year old female who presents for evaluation headaches which are most likely cervicogenic. They do have migrainous features.  Reviewed brain and cervical MRI with her and request that my colleagues in spine weigh in on the question of cervicogenic headache and any recommendations for treatment.    She will contact me after any interventions are performed and evaluated with them.    She may move out her January appt to allow time for that.    The longitudinal plan of care for Evangelina was addressed during this visit. Due to the added complexity in care, I will continue to support Evangelina in the subsequent management of this condition(s) and with the ongoing continuity of care of this condition(s).    Total time spent was 30 minutes today.    Shayy Alicea MD  Neurology     Subjective:    Evangelina De Anda returns for follow up of positionally provoked headaches from turning her neck to the left while reclining.   She has had neck adjustments and massage in neck and shoulders with chiropractic manipulation and she did have benefit. She also could not tolerate amitriptyline due to oral dryness when exercising.    She has had mixed results of rizatriptan. She feels that her body may not be able to recover due to lack of sleep. She sleeps 2 hours at a time and attributes night time awakenings to headaches. She has 2-3 days and is good, and sometimes it will pop in, pain is usually there upon awakening.  It seems that when she lays on her couch, it is a poor place to lay now. She finds comfort in not having a pillow.    She feels  "like the sharp pain component is better. But intermittently will be much worse.     Objective:    Vitals: /72   Pulse 61   Ht 1.702 m (5' 7\")   Wt 63.5 kg (140 lb)   LMP 05/15/2019   SpO2 100%   BMI 21.93 kg/m    General: Cooperative, NAD  Neurologic:  Mental Status: Fully alert, attentive and oriented. Speech clear and fluent.   Cranial Nerves: Facial movements symmetric.   Motor: No abnormal movements.      Pertinent Investigations:          10/24/2024     1:22 PM 12/11/2024     1:54 PM   HIT-6   When you have headaches, how often is the pain severe 11  11    How often do headaches limit your ability to do usual daily activities including household work, work, school, or social activities? 11  11    When you have a headache, how often do you wish you could lie down? 10  11    In the past 4 weeks, how often have you felt too tired to do work or daily activities because of your headaches 13  11    In the past 4 weeks, how often have you felt fed up or irritated because of your headaches 13  11    In the past 4 weeks, how often did headaches limit your ability to concentrate on work or daily activities 11  11    HIT-6 Total Score 69  66        Patient-reported           10/24/2024     1:27 PM 12/11/2024     1:56 PM   MIDAS - in the past three months:   On how many days did you miss work or school because of your headaches? 2 3   How many days was your productivity at work or school reduced by half or more because of your headaches? 45 10   On how many days did you not do household work because of your headaches? 45 10   How many days was your productivity in household work reduced by half or more because of your headaches? 45 10   On how many days did you miss family, social, or leisure activities because of your headaches? 5 10   On how many days did you have a headache? 60 10   On a scale of 0-10, on average how painful were these headaches? 9 8   MIDAS Score 142 (IV - Severe Disability) 43 (IV - Severe " Disability)           Again, thank you for allowing me to participate in the care of your patient.        Sincerely,        Shayy Alicea MD

## 2024-12-12 NOTE — PROGRESS NOTES
"[facility]    Headache Neurology Progress Note  December 12, 2024    Assessment/Plan:   Evangelina De Anda is a 58 year old female who presents for evaluation headaches which are most likely cervicogenic. They do have migrainous features.  Reviewed brain and cervical MRI with her and request that my colleagues in spine weigh in on the question of cervicogenic headache and any recommendations for treatment.    She will contact me after any interventions are performed and evaluated with them.    She may move out her January appt to allow time for that.    The longitudinal plan of care for Evangelina was addressed during this visit. Due to the added complexity in care, I will continue to support Evangelina in the subsequent management of this condition(s) and with the ongoing continuity of care of this condition(s).    Total time spent was 30 minutes today.    Shayy Alicea MD  Neurology     Subjective:    Evangelina De Anda returns for follow up of positionally provoked headaches from turning her neck to the left while reclining.   She has had neck adjustments and massage in neck and shoulders with chiropractic manipulation and she did have benefit. She also could not tolerate amitriptyline due to oral dryness when exercising.    She has had mixed results of rizatriptan. She feels that her body may not be able to recover due to lack of sleep. She sleeps 2 hours at a time and attributes night time awakenings to headaches. She has 2-3 days and is good, and sometimes it will pop in, pain is usually there upon awakening.  It seems that when she lays on her couch, it is a poor place to lay now. She finds comfort in not having a pillow.    She feels like the sharp pain component is better. But intermittently will be much worse.     Objective:    Vitals: /72   Pulse 61   Ht 1.702 m (5' 7\")   Wt 63.5 kg (140 lb)   LMP 05/15/2019   SpO2 100%   BMI 21.93 kg/m    General: Cooperative, " NAD  Neurologic:  Mental Status: Fully alert, attentive and oriented. Speech clear and fluent.   Cranial Nerves: Facial movements symmetric.   Motor: No abnormal movements.      Pertinent Investigations:          10/24/2024     1:22 PM 12/11/2024     1:54 PM   HIT-6   When you have headaches, how often is the pain severe 11  11    How often do headaches limit your ability to do usual daily activities including household work, work, school, or social activities? 11  11    When you have a headache, how often do you wish you could lie down? 10  11    In the past 4 weeks, how often have you felt too tired to do work or daily activities because of your headaches 13  11    In the past 4 weeks, how often have you felt fed up or irritated because of your headaches 13  11    In the past 4 weeks, how often did headaches limit your ability to concentrate on work or daily activities 11  11    HIT-6 Total Score 69  66        Patient-reported           10/24/2024     1:27 PM 12/11/2024     1:56 PM   MIDAS - in the past three months:   On how many days did you miss work or school because of your headaches? 2 3   How many days was your productivity at work or school reduced by half or more because of your headaches? 45 10   On how many days did you not do household work because of your headaches? 45 10   How many days was your productivity in household work reduced by half or more because of your headaches? 45 10   On how many days did you miss family, social, or leisure activities because of your headaches? 5 10   On how many days did you have a headache? 60 10   On a scale of 0-10, on average how painful were these headaches? 9 8   MIDAS Score 142 (IV - Severe Disability) 43 (IV - Severe Disability)

## 2024-12-12 NOTE — NURSING NOTE
"Evangelina De Anda is a 58 year old female who presents for:  Chief Complaint   Patient presents with    Headache     Follow up      HA follow up: doing well today, finding that when laying down at night and turning head to L seems worse. R side is better. Sitting in a certain position on the couch she obtains HA and it is hard to get rid of them. Using IBU and excedrine (which she finds is more effective). 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 Was given another HA medication and that one she has been out of medication, worked sometimes/ not all the time.      Initial Vitals:  Ht 1.702 m (5' 7\")   Wt 63.5 kg (140 lb)   LMP 05/15/2019   BMI 21.93 kg/m   Estimated body mass index is 21.93 kg/m  as calculated from the following:    Height as of this encounter: 1.702 m (5' 7\").    Weight as of this encounter: 63.5 kg (140 lb).. Body surface area is 1.73 meters squared. BP completed using cuff size: betzy Burdick CMA    "

## 2024-12-16 ENCOUNTER — PATIENT OUTREACH (OUTPATIENT)
Dept: CARE COORDINATION | Facility: CLINIC | Age: 59
End: 2024-12-16
Payer: COMMERCIAL

## 2025-01-25 ENCOUNTER — HEALTH MAINTENANCE LETTER (OUTPATIENT)
Age: 60
End: 2025-01-25

## 2025-02-05 ENCOUNTER — HOSPITAL ENCOUNTER (OUTPATIENT)
Dept: MAMMOGRAPHY | Facility: CLINIC | Age: 60
Discharge: HOME | End: 2025-02-05
Attending: NURSE PRACTITIONER
Payer: COMMERCIAL

## 2025-02-05 DIAGNOSIS — Z12.31 VISIT FOR SCREENING MAMMOGRAM: ICD-10-CM

## 2025-02-05 PROCEDURE — 77063 BREAST TOMOSYNTHESIS BI: CPT

## 2025-05-14 ENCOUNTER — TRANSFERRED RECORDS (OUTPATIENT)
Dept: HEALTH INFORMATION MANAGEMENT | Facility: CLINIC | Age: 60
End: 2025-05-14
Payer: COMMERCIAL

## 2025-05-14 ENCOUNTER — TRANSFERRED RECORDS (OUTPATIENT)
Dept: ADMINISTRATIVE | Facility: CLINIC | Age: 60
End: 2025-05-14
Payer: COMMERCIAL

## 2025-05-15 ENCOUNTER — TRANSFERRED RECORDS (OUTPATIENT)
Dept: HEALTH INFORMATION MANAGEMENT | Facility: CLINIC | Age: 60
End: 2025-05-15
Payer: COMMERCIAL

## 2025-05-15 ENCOUNTER — RESULTS FOLLOW-UP (OUTPATIENT)
Dept: GASTROENTEROLOGY | Facility: CLINIC | Age: 60
End: 2025-05-15

## 2025-05-15 NOTE — PROCEDURES
[facility]   [address], [address], [place] [address]     Patient Name: Evangelina De Anda  Gender:  Female  Exam Date: 05/14/2025 Visit Number:  [id number]  Age: 59 Years YOB: 1965  Attending MD: Moncho Almanza MD Medical Record#:  [id number]    Procedure: Colonoscopy   Indications: Previous advanced adenomatous polyp(s)      Referring MD: Referral Self  Primary MD:      Gladys Raymond MD  Medications: Admitting Medications:   0.9% Normal Saline at TKO   Intra Procedure Medications:   Patient received monitored anesthesia care.     Complications: No immediate complications  ______________________________________________________________________________  Procedure:   An examination of the heart and lungs was performed and found to be within acceptable limits.  .  The patient was therefore deemed a reasonable candidate for endoscopy and sedation.   The risks and benefits of the procedure were explained to the patient.After obtaining informed consent, the patient received monitored anesthesia care and I passed the scope   with difficulty via the rectum to the cecum.  The appendiceal orifice and ic valve were identified.  The scope was retroflexed during the examination  The quality of the prep was excellent  (Miralax/Gatorade/2 tablets Bisacodyl/Magnesium Citrate).    This was a complete examination throughout the entire colon.    Findings:    Normal finding.  Location - ileum.    Polyp location: cecum.  Quantity: 3.  Size:  2 mm, 3 mm, 11-15 mm.  Polyp shape:  sessile.         Maneuver: polypectomy was performed with a cold biopsy forceps and cold biopsy forceps.       Removal:  complete.  Retrieval: complete.  Bleeding: none.    The cecal polyps were on the back side of the ileocecal valve. There was one area >1cm which had a carpeted appearance and seemed to be almost continuous with the ileal mucosa, so it was difficult to tell where the polyp stopped and ileal mucosa  began.. There were two other smaller satellite lesions that were not contiguous with the larger polyp so I ended up calling it three polyps, but it was all sort of the same lesion.    Tortuous colon that made it difficult to intubate the ileum or retroflex in the cecum.  Remainder of the exam is normal.    Impression:  Encounter for screening for malignant neoplasm of colon  Colorectal polyp detected on colonoscopy  MD impression comments:  Assuming the pathology shows adenomatous tissue, I would recommend repeat in 1 year, or at least no more than 2 years in order to make sure all the adenomatous tissue has been removed given the above.    Preliminary Plan:  Repeat colonoscopy in 1 year  Pathology Results:  A: COLON, CECUM, POLYPS:           1. Tubular adenomas (3), one of which is an advanced adenoma           2. Negative for high grade dysplasia           3. Per the colonoscopy report:               a. Polyp sizes: 2 mm, 3 mm and 11-15 mm               b. Resection: Complete               c. Retrieval: Complete      COMMENTS  A. Advanced adenoma of the colorectum is defined by the American College of Gastroenterology (ACG) as an adenoma that is 1 cm or more in size, contains an appreciable villous component, or has high grade dysplasia (Marco RAMOS; Polyp Guideline: Diagnosis, Treatment, and Surveillance for Patients with Colorectal Polyps. Am J Gastroenterol 2000;95(11):3075-4858).  This polyp qualifies as such.  Patients with advanced adenomas are at increased risk for synchronous and metachronous additional advanced adenomas.  Appropriate follow-up is suggested.      MICROSCOPIC  A: Performed     Electronically signed by: Arnold Lynn MD    Interpreted at [facility], [address] [place] [id number]    Orders    Diagnostics:  Procedure Comments Timeframe Assessment   Colonoscopy  1 Year K63.5     Instruction(s)/Education:  Instruction/Education Timeframe Assessment    Colon Cancer Prevention  K63.5   Colon Polyps  K63.5       Final Plan:  Repeat colonoscopy in 1 year.    We will attempt to contact you at appropriate intervals via [place] mail.  We may not be able to find you or contact you at that time, therefore you should know that the responsibility for following our recommendation rests with you.  If you don't hear from us at the time your procedure is due, please contact our office to schedule an appointment.  If your contact information should change, please contact our office so that we can update your record.  Additional Comments:  Dear Evangelina, the polyp material removed was adenomatous polyp.  For that reason I would recommend repeating a colonoscopy in 1 year as we discussed to look at that same area.      _Electronically signed by:___________________  Moncho Almanza MD                 05/14/2025    cc: Gladys Raymond MD

## 2025-05-22 ENCOUNTER — OFFICE VISIT (OUTPATIENT)
Dept: FAMILY MEDICINE | Facility: CLINIC | Age: 60
End: 2025-05-22
Payer: COMMERCIAL

## 2025-05-22 VITALS
BODY MASS INDEX: 22.6 KG/M2 | DIASTOLIC BLOOD PRESSURE: 70 MMHG | WEIGHT: 144 LBS | TEMPERATURE: 97.8 F | RESPIRATION RATE: 12 BRPM | HEIGHT: 67 IN | HEART RATE: 56 BPM | OXYGEN SATURATION: 98 % | SYSTOLIC BLOOD PRESSURE: 112 MMHG

## 2025-05-22 DIAGNOSIS — H93.13 TINNITUS, BILATERAL: Primary | ICD-10-CM

## 2025-05-22 PROCEDURE — 3074F SYST BP LT 130 MM HG: CPT | Performed by: FAMILY MEDICINE

## 2025-05-22 PROCEDURE — 99213 OFFICE O/P EST LOW 20 MIN: CPT | Performed by: FAMILY MEDICINE

## 2025-05-22 PROCEDURE — 1126F AMNT PAIN NOTED NONE PRSNT: CPT | Performed by: FAMILY MEDICINE

## 2025-05-22 PROCEDURE — 3078F DIAST BP <80 MM HG: CPT | Performed by: FAMILY MEDICINE

## 2025-05-22 ASSESSMENT — PAIN SCALES - GENERAL: PAINLEVEL_OUTOF10: NO PAIN (0)

## 2025-05-22 NOTE — PROGRESS NOTES
"  Assessment & Plan     Tinnitus, bilateral  Refer to ENT for further audiology evaluation.  - Adult ENT  Referral; Future      Subjective   Evangelina is a 59 year old, presenting for the following health issues:  Ear Problem        10/23/2024     8:55 AM   Additional Questions   Roomed by Emmanuel CARRERO   Accompanied by Self     Ear Problem    History of Present Illness       Reason for visit:  I have had ringing in my ears for 3 months  Symptom onset:  More than a month  Symptoms include:  Ringing in my ears  Symptom progression:  Worsening  Had these symptoms before:  No  What makes it worse:  No  What makes it better:  No   She is taking medications regularly.        She has noticed bilateral tinnitus. More noticeable at night when it is quiet. Has used TV and fan to cover it up. She also notes that right ear is very itchy. No hearing loss. She had been having headaches which have improved a bit after limiting caffeine.    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          Review of Systems  Constitutional, HEENT, cardiovascular, pulmonary, gi and gu systems are negative, except as otherwise noted.      Objective    /70   Pulse 56   Temp 97.8  F (36.6  C) (Tympanic)   Resp 12   Ht 1.702 m (5' 7\")   Wt 65.3 kg (144 lb)   LMP 05/15/2019   SpO2 98%   BMI 22.55 kg/m    Body mass index is 22.55 kg/m .  Physical Exam   GENERAL: alert and no distress  HENT: ear canals and TM's normal, nose and mouth without ulcers or lesions  NECK: no adenopathy, no asymmetry, masses, or scars  RESP: lungs clear to auscultation - no rales, rhonchi or wheezes  CV: regular rate and rhythm, normal S1 S2, no S3 or S4, no murmur, click or rub, no peripheral edema  PSYCH: mentation appears normal, affect normal/bright            Signed Electronically by: Zane Watt DO  "

## 2025-08-16 ENCOUNTER — HEALTH MAINTENANCE LETTER (OUTPATIENT)
Age: 60
End: 2025-08-16

## (undated) DEVICE — SOL NACL 0.9% IRRIG 1000ML BOTTLE 2F7124

## (undated) DEVICE — GLOVE PROTEXIS MICRO 7.0  2D73PM70

## (undated) DEVICE — SYR 03ML LL W/O NDL 309657

## (undated) DEVICE — GLOVE PROTEXIS BLUE W/NEU-THERA 6.5  2D73EB65

## (undated) DEVICE — LINEN TOWEL PACK X10 5473

## (undated) DEVICE — SYR 10ML SLIP TIP W/O NDL 303134

## (undated) DEVICE — GLOVE PROTEXIS POWDER FREE SMT 6.0  2D72PT60X

## (undated) DEVICE — NDL INSUFFLATION 13GA 120MM C2201

## (undated) DEVICE — ENDO TROCAR SLEEVE KII Z-THREADED 05X100MM CTS02

## (undated) DEVICE — ESU GROUND PAD ADULT W/CORD E7507

## (undated) DEVICE — CATH TRAY FOLEY SURESTEP 16FR DRAIN BAG STATOCK A899916

## (undated) DEVICE — LINEN HALF SHEET 5512

## (undated) DEVICE — SYR 10ML LL W/O NDL 302995

## (undated) DEVICE — PACK GYN LAPAROSCOPY RIDGES

## (undated) DEVICE — ESU FCP OLYMPUS PK CUTTING 5MMX33CM PK-CF0533

## (undated) DEVICE — SUCTION CURETTE 3MM ENDOMETRIAL MX140

## (undated) DEVICE — BAG CLEAR TRASH 1.3M 39X33" P4040C

## (undated) DEVICE — SU MONOCRYL 4-0 PS-2 27" UND Y426H

## (undated) DEVICE — ADH SKIN CLOSURE PREMIERPRO EXOFIN MICOR HV 0.5ML 3471

## (undated) DEVICE — PAD CHUX UNDERPAD 30X36" P3036C

## (undated) DEVICE — SYR 30ML LL W/O NDL 302832

## (undated) DEVICE — LINEN FULL SHEET 5511

## (undated) DEVICE — ENDO TROCAR FIRST ENTRY KII FIOS Z-THRD 05X100MM CTF03

## (undated) RX ORDER — LIDOCAINE HYDROCHLORIDE 10 MG/ML
INJECTION, SOLUTION EPIDURAL; INFILTRATION; INTRACAUDAL; PERINEURAL
Status: DISPENSED
Start: 2019-09-04

## (undated) RX ORDER — CEFAZOLIN SODIUM 2 G/100ML
INJECTION, SOLUTION INTRAVENOUS
Status: DISPENSED
Start: 2019-09-04

## (undated) RX ORDER — KETOROLAC TROMETHAMINE 30 MG/ML
INJECTION, SOLUTION INTRAMUSCULAR; INTRAVENOUS
Status: DISPENSED
Start: 2019-09-04

## (undated) RX ORDER — GLYCOPYRROLATE 0.2 MG/ML
INJECTION INTRAMUSCULAR; INTRAVENOUS
Status: DISPENSED
Start: 2019-09-04

## (undated) RX ORDER — DEXAMETHASONE SODIUM PHOSPHATE 4 MG/ML
INJECTION, SOLUTION INTRA-ARTICULAR; INTRALESIONAL; INTRAMUSCULAR; INTRAVENOUS; SOFT TISSUE
Status: DISPENSED
Start: 2019-09-04

## (undated) RX ORDER — GABAPENTIN 300 MG/1
CAPSULE ORAL
Status: DISPENSED
Start: 2019-09-04

## (undated) RX ORDER — ACETAMINOPHEN 325 MG/1
TABLET ORAL
Status: DISPENSED
Start: 2019-09-04

## (undated) RX ORDER — BUPIVACAINE HYDROCHLORIDE 2.5 MG/ML
INJECTION, SOLUTION EPIDURAL; INFILTRATION; INTRACAUDAL
Status: DISPENSED
Start: 2019-09-04

## (undated) RX ORDER — ONDANSETRON 2 MG/ML
INJECTION INTRAMUSCULAR; INTRAVENOUS
Status: DISPENSED
Start: 2019-09-04

## (undated) RX ORDER — CELECOXIB 200 MG/1
CAPSULE ORAL
Status: DISPENSED
Start: 2019-09-04

## (undated) RX ORDER — FENTANYL CITRATE 50 UG/ML
INJECTION, SOLUTION INTRAMUSCULAR; INTRAVENOUS
Status: DISPENSED
Start: 2019-09-04

## (undated) RX ORDER — BUPIVACAINE HYDROCHLORIDE AND EPINEPHRINE 5; 5 MG/ML; UG/ML
INJECTION, SOLUTION EPIDURAL; INTRACAUDAL; PERINEURAL
Status: DISPENSED
Start: 2019-09-04

## (undated) RX ORDER — NEOSTIGMINE METHYLSULFATE 1 MG/ML
VIAL (ML) INJECTION
Status: DISPENSED
Start: 2019-09-04

## (undated) RX ORDER — PROPOFOL 10 MG/ML
INJECTION, EMULSION INTRAVENOUS
Status: DISPENSED
Start: 2019-09-04